# Patient Record
Sex: FEMALE | Race: WHITE | NOT HISPANIC OR LATINO | Employment: UNEMPLOYED | URBAN - METROPOLITAN AREA
[De-identification: names, ages, dates, MRNs, and addresses within clinical notes are randomized per-mention and may not be internally consistent; named-entity substitution may affect disease eponyms.]

---

## 2020-10-06 ENCOUNTER — APPOINTMENT (OUTPATIENT)
Dept: GENERAL RADIOLOGY | Facility: HOSPITAL | Age: 40
End: 2020-10-06

## 2020-10-06 ENCOUNTER — HOSPITAL ENCOUNTER (OUTPATIENT)
Facility: HOSPITAL | Age: 40
Setting detail: OBSERVATION
Discharge: HOME | End: 2020-10-07
Attending: INTERNAL MEDICINE | Admitting: INTERNAL MEDICINE

## 2020-10-06 DIAGNOSIS — R07.9 CHEST PAIN, UNSPECIFIED TYPE: Primary | ICD-10-CM

## 2020-10-06 PROBLEM — E66.811 CLASS 1 OBESITY DUE TO EXCESS CALORIES WITHOUT SERIOUS COMORBIDITY WITH BODY MASS INDEX (BMI) OF 32.0 TO 32.9 IN ADULT: Chronic | Status: ACTIVE | Noted: 2020-10-06

## 2020-10-06 PROBLEM — E66.09 CLASS 1 OBESITY DUE TO EXCESS CALORIES WITHOUT SERIOUS COMORBIDITY WITH BODY MASS INDEX (BMI) OF 32.0 TO 32.9 IN ADULT: Chronic | Status: ACTIVE | Noted: 2020-10-06

## 2020-10-06 PROBLEM — I10 ESSENTIAL HYPERTENSION: Chronic | Status: ACTIVE | Noted: 2020-10-06

## 2020-10-06 PROBLEM — F17.200 TOBACCO DEPENDENCE: Chronic | Status: ACTIVE | Noted: 2020-10-06

## 2020-10-06 LAB
ALBUMIN SERPL-MCNC: 4.2 G/DL (ref 3.5–5.2)
ALBUMIN/GLOB SERPL: 1.4 G/DL
ALP SERPL-CCNC: 87 U/L (ref 39–117)
ALT SERPL W P-5'-P-CCNC: 13 U/L (ref 1–33)
ANION GAP SERPL CALCULATED.3IONS-SCNC: 12 MMOL/L (ref 5–15)
AST SERPL-CCNC: 18 U/L (ref 1–32)
BACTERIA UR QL AUTO: ABNORMAL /HPF
BASOPHILS # BLD AUTO: 0.2 10*3/MM3 (ref 0–0.2)
BASOPHILS NFR BLD AUTO: 1 % (ref 0–1.5)
BILIRUB SERPL-MCNC: 0.4 MG/DL (ref 0–1.2)
BILIRUB UR QL STRIP: NEGATIVE
BUN SERPL-MCNC: 6 MG/DL (ref 6–20)
BUN SERPL-MCNC: NORMAL MG/DL
BUN/CREAT SERPL: NORMAL
CALCIUM SPEC-SCNC: 9.4 MG/DL (ref 8.6–10.5)
CHLORIDE SERPL-SCNC: 104 MMOL/L (ref 98–107)
CLARITY UR: CLEAR
CO2 SERPL-SCNC: 22 MMOL/L (ref 22–29)
COLOR UR: YELLOW
CREAT SERPL-MCNC: 0.68 MG/DL (ref 0.57–1)
D DIMER PPP FEU-MCNC: 0.25 MG/L (FEU) (ref 0–0.59)
DEPRECATED RDW RBC AUTO: 40.3 FL (ref 37–54)
EOSINOPHIL # BLD AUTO: 0.1 10*3/MM3 (ref 0–0.4)
EOSINOPHIL NFR BLD AUTO: 0.8 % (ref 0.3–6.2)
ERYTHROCYTE [DISTWIDTH] IN BLOOD BY AUTOMATED COUNT: 13.8 % (ref 12.3–15.4)
GFR SERPL CREATININE-BSD FRML MDRD: 96 ML/MIN/1.73
GLOBULIN UR ELPH-MCNC: 2.9 GM/DL
GLUCOSE SERPL-MCNC: 95 MG/DL (ref 65–99)
GLUCOSE UR STRIP-MCNC: NEGATIVE MG/DL
HCT VFR BLD AUTO: 47.9 % (ref 34–46.6)
HGB BLD-MCNC: 16.7 G/DL (ref 12–15.9)
HGB UR QL STRIP.AUTO: ABNORMAL
HOLD SPECIMEN: NORMAL
HYALINE CASTS UR QL AUTO: ABNORMAL /LPF
KETONES UR QL STRIP: NEGATIVE
LEUKOCYTE ESTERASE UR QL STRIP.AUTO: NEGATIVE
LYMPHOCYTES # BLD AUTO: 3 10*3/MM3 (ref 0.7–3.1)
LYMPHOCYTES NFR BLD AUTO: 17.9 % (ref 19.6–45.3)
MCH RBC QN AUTO: 29.4 PG (ref 26.6–33)
MCHC RBC AUTO-ENTMCNC: 34.9 G/DL (ref 31.5–35.7)
MCV RBC AUTO: 84 FL (ref 79–97)
MONOCYTES # BLD AUTO: 0.9 10*3/MM3 (ref 0.1–0.9)
MONOCYTES NFR BLD AUTO: 5.2 % (ref 5–12)
NEUTROPHILS NFR BLD AUTO: 12.5 10*3/MM3 (ref 1.7–7)
NEUTROPHILS NFR BLD AUTO: 75.1 % (ref 42.7–76)
NITRITE UR QL STRIP: NEGATIVE
NRBC BLD AUTO-RTO: 0 /100 WBC (ref 0–0.2)
NT-PROBNP SERPL-MCNC: 123.5 PG/ML (ref 0–450)
PH UR STRIP.AUTO: 7.5 [PH] (ref 5–8)
PLATELET # BLD AUTO: 341 10*3/MM3 (ref 140–450)
PMV BLD AUTO: 8.6 FL (ref 6–12)
POTASSIUM SERPL-SCNC: 3.8 MMOL/L (ref 3.5–5.2)
PROT SERPL-MCNC: 7.1 G/DL (ref 6–8.5)
PROT UR QL STRIP: NEGATIVE
RBC # BLD AUTO: 5.7 10*6/MM3 (ref 3.77–5.28)
RBC # UR: ABNORMAL /HPF
REF LAB TEST METHOD: ABNORMAL
SODIUM SERPL-SCNC: 138 MMOL/L (ref 136–145)
SP GR UR STRIP: 1.01 (ref 1–1.03)
SQUAMOUS #/AREA URNS HPF: ABNORMAL /HPF
TROPONIN T SERPL-MCNC: <0.01 NG/ML (ref 0–0.03)
TROPONIN T SERPL-MCNC: <0.01 NG/ML (ref 0–0.03)
UROBILINOGEN UR QL STRIP: ABNORMAL
WBC # BLD AUTO: 16.6 10*3/MM3 (ref 3.4–10.8)
WBC UR QL AUTO: ABNORMAL /HPF
WHOLE BLOOD HOLD SPECIMEN: NORMAL
WHOLE BLOOD HOLD SPECIMEN: NORMAL

## 2020-10-06 PROCEDURE — 85025 COMPLETE CBC W/AUTO DIFF WBC: CPT | Performed by: NURSE PRACTITIONER

## 2020-10-06 PROCEDURE — 85379 FIBRIN DEGRADATION QUANT: CPT | Performed by: NURSE PRACTITIONER

## 2020-10-06 PROCEDURE — G0378 HOSPITAL OBSERVATION PER HR: HCPCS

## 2020-10-06 PROCEDURE — 84520 ASSAY OF UREA NITROGEN: CPT | Performed by: NURSE PRACTITIONER

## 2020-10-06 PROCEDURE — 99285 EMERGENCY DEPT VISIT HI MDM: CPT

## 2020-10-06 PROCEDURE — 99219 PR INITIAL OBSERVATION CARE/DAY 50 MINUTES: CPT | Performed by: PHYSICIAN ASSISTANT

## 2020-10-06 PROCEDURE — 93005 ELECTROCARDIOGRAM TRACING: CPT

## 2020-10-06 PROCEDURE — 83880 ASSAY OF NATRIURETIC PEPTIDE: CPT | Performed by: NURSE PRACTITIONER

## 2020-10-06 PROCEDURE — 80053 COMPREHEN METABOLIC PANEL: CPT | Performed by: NURSE PRACTITIONER

## 2020-10-06 PROCEDURE — 81001 URINALYSIS AUTO W/SCOPE: CPT | Performed by: NURSE PRACTITIONER

## 2020-10-06 PROCEDURE — 84484 ASSAY OF TROPONIN QUANT: CPT | Performed by: INTERNAL MEDICINE

## 2020-10-06 PROCEDURE — 84484 ASSAY OF TROPONIN QUANT: CPT | Performed by: NURSE PRACTITIONER

## 2020-10-06 PROCEDURE — 71045 X-RAY EXAM CHEST 1 VIEW: CPT

## 2020-10-06 PROCEDURE — 93005 ELECTROCARDIOGRAM TRACING: CPT | Performed by: INTERNAL MEDICINE

## 2020-10-06 RX ORDER — HYDROCHLOROTHIAZIDE 12.5 MG/1
12.5 TABLET ORAL DAILY
Status: DISCONTINUED | OUTPATIENT
Start: 2020-10-07 | End: 2020-10-07 | Stop reason: HOSPADM

## 2020-10-06 RX ORDER — SODIUM CHLORIDE 0.9 % (FLUSH) 0.9 %
10 SYRINGE (ML) INJECTION EVERY 12 HOURS SCHEDULED
Status: DISCONTINUED | OUTPATIENT
Start: 2020-10-06 | End: 2020-10-07 | Stop reason: HOSPADM

## 2020-10-06 RX ORDER — SODIUM CHLORIDE 0.9 % (FLUSH) 0.9 %
10 SYRINGE (ML) INJECTION AS NEEDED
Status: DISCONTINUED | OUTPATIENT
Start: 2020-10-06 | End: 2020-10-07 | Stop reason: HOSPADM

## 2020-10-06 RX ORDER — ACETAMINOPHEN 500 MG
1000 TABLET ORAL ONCE
Status: COMPLETED | OUTPATIENT
Start: 2020-10-06 | End: 2020-10-06

## 2020-10-06 RX ORDER — HYDROCHLOROTHIAZIDE 12.5 MG/1
12.5 TABLET ORAL DAILY
COMMUNITY
End: 2020-10-22 | Stop reason: ALTCHOICE

## 2020-10-06 RX ORDER — ACETAMINOPHEN 325 MG/1
650 TABLET ORAL EVERY 4 HOURS PRN
Status: DISCONTINUED | OUTPATIENT
Start: 2020-10-06 | End: 2020-10-07 | Stop reason: HOSPADM

## 2020-10-06 RX ORDER — ONDANSETRON 4 MG/1
4 TABLET, FILM COATED ORAL EVERY 6 HOURS PRN
Status: DISCONTINUED | OUTPATIENT
Start: 2020-10-06 | End: 2020-10-07 | Stop reason: HOSPADM

## 2020-10-06 RX ORDER — DOCUSATE SODIUM 100 MG/1
100 CAPSULE, LIQUID FILLED ORAL 2 TIMES DAILY PRN
Status: DISCONTINUED | OUTPATIENT
Start: 2020-10-06 | End: 2020-10-07 | Stop reason: HOSPADM

## 2020-10-06 RX ORDER — ACETAMINOPHEN 650 MG/1
650 SUPPOSITORY RECTAL EVERY 4 HOURS PRN
Status: DISCONTINUED | OUTPATIENT
Start: 2020-10-06 | End: 2020-10-07 | Stop reason: HOSPADM

## 2020-10-06 RX ORDER — ALUMINA, MAGNESIA, AND SIMETHICONE 2400; 2400; 240 MG/30ML; MG/30ML; MG/30ML
15 SUSPENSION ORAL EVERY 6 HOURS PRN
Status: DISCONTINUED | OUTPATIENT
Start: 2020-10-06 | End: 2020-10-07 | Stop reason: HOSPADM

## 2020-10-06 RX ORDER — BISACODYL 10 MG
10 SUPPOSITORY, RECTAL RECTAL DAILY PRN
Status: DISCONTINUED | OUTPATIENT
Start: 2020-10-06 | End: 2020-10-07 | Stop reason: HOSPADM

## 2020-10-06 RX ORDER — ACETAMINOPHEN 160 MG/5ML
650 SOLUTION ORAL EVERY 4 HOURS PRN
Status: DISCONTINUED | OUTPATIENT
Start: 2020-10-06 | End: 2020-10-07 | Stop reason: HOSPADM

## 2020-10-06 RX ORDER — ONDANSETRON 2 MG/ML
4 INJECTION INTRAMUSCULAR; INTRAVENOUS EVERY 6 HOURS PRN
Status: DISCONTINUED | OUTPATIENT
Start: 2020-10-06 | End: 2020-10-07 | Stop reason: HOSPADM

## 2020-10-06 RX ORDER — CALCIUM CARBONATE 200(500)MG
1 TABLET,CHEWABLE ORAL 2 TIMES DAILY PRN
Status: DISCONTINUED | OUTPATIENT
Start: 2020-10-06 | End: 2020-10-07 | Stop reason: HOSPADM

## 2020-10-06 RX ORDER — NITROGLYCERIN 0.4 MG/1
0.4 TABLET SUBLINGUAL
Status: DISCONTINUED | OUTPATIENT
Start: 2020-10-06 | End: 2020-10-07 | Stop reason: HOSPADM

## 2020-10-06 RX ORDER — ASPIRIN 325 MG
325 TABLET, DELAYED RELEASE (ENTERIC COATED) ORAL ONCE
Status: COMPLETED | OUTPATIENT
Start: 2020-10-06 | End: 2020-10-06

## 2020-10-06 RX ORDER — CHOLECALCIFEROL (VITAMIN D3) 125 MCG
5 CAPSULE ORAL NIGHTLY PRN
Status: DISCONTINUED | OUTPATIENT
Start: 2020-10-06 | End: 2020-10-07 | Stop reason: HOSPADM

## 2020-10-06 RX ADMIN — CALCIUM CARBONATE (ANTACID) CHEW TAB 500 MG 1 TABLET: 500 CHEW TAB at 20:56

## 2020-10-06 RX ADMIN — Medication 10 ML: at 20:56

## 2020-10-06 RX ADMIN — ASPIRIN 325 MG: 325 TABLET, COATED ORAL at 21:18

## 2020-10-06 RX ADMIN — ACETAMINOPHEN 1000 MG: 500 TABLET, FILM COATED ORAL at 17:43

## 2020-10-06 NOTE — ED PROVIDER NOTES
Subjective   Patient is a 40-year-old female who presents emergency department with a complaint of chest pain, elevated blood pressure, left arm tingling.  This began yesterday, she was seen at [facility] in the emergency department, and was also seen by her primary care provider who was initially referred to the emergency department today.  She reports the pain is intermittent, as well as the numbness and tingling.  It is not consistent.  Is nonradiating.  Describes the pain as a heaviness when it does occur, and only occurs for brief periods of time.  She denies any shortness of breath.  No abnormal leg pain or swelling.  Not had a fever cough or chills.  No respiratory symptoms.  No recent long periods of immobility    She reports that she was discharged from [facility], she was started on HCTZ 12.5 mg once a day.  She reports she has been taking the medication but noticed her blood pressure still elevated today.            Review of Systems   Constitutional: Negative for chills and fever.   HENT: Negative for congestion and rhinorrhea.    Respiratory: Negative for cough, chest tightness and shortness of breath.    Cardiovascular: Positive for chest pain. Negative for palpitations and leg swelling.   Gastrointestinal: Negative for abdominal pain, diarrhea, nausea and vomiting.   Genitourinary: Negative for dysuria.   Musculoskeletal: Negative for back pain and neck pain.   Skin: Negative.    Neurological: Positive for numbness. Negative for dizziness and light-headedness.       No past medical history on file.    No Known Allergies    No past surgical history on file.    No family history on file.    Social History     Socioeconomic History   • Marital status:      Spouse name: Not on file   • Number of children: Not on file   • Years of education: Not on file   • Highest education level: Not on file           Objective   Physical Exam  Vitals signs and nursing note reviewed.   Constitutional:       " General: She is not in acute distress.     Appearance: She is well-developed. She is not ill-appearing, toxic-appearing or diaphoretic.   HENT:      Head: Normocephalic and atraumatic.   Eyes:      Extraocular Movements: Extraocular movements intact.      Pupils: Pupils are equal, round, and reactive to light.   Neck:      Musculoskeletal: Normal range of motion and neck supple.   Cardiovascular:      Rate and Rhythm: Normal rate and regular rhythm.      Pulses:           Radial pulses are 2+ on the right side and 2+ on the left side.        Dorsalis pedis pulses are 2+ on the right side and 2+ on the left side.      Heart sounds: Normal heart sounds. No murmur. No friction rub. No gallop.    Pulmonary:      Effort: Pulmonary effort is normal.      Breath sounds: Normal breath sounds.   Abdominal:      General: Bowel sounds are normal.      Palpations: Abdomen is soft.      Tenderness: There is no abdominal tenderness. There is no guarding or rebound.   Musculoskeletal:      Right lower leg: She exhibits no tenderness. No edema.      Left lower leg: She exhibits no tenderness. No edema.   Skin:     General: Skin is warm and dry.      Capillary Refill: Capillary refill takes less than 2 seconds.   Neurological:      General: No focal deficit present.      Mental Status: She is alert and oriented to person, place, and time.   Psychiatric:         Mood and Affect: Mood normal.         Behavior: Behavior normal.         Procedures           ED Course  /85 (BP Location: Right arm, Patient Position: Lying)   Pulse 78   Temp 98.1 °F (36.7 °C)   Resp 16   Ht 162.6 cm (64\")   Wt 85.3 kg (188 lb 0.8 oz)   SpO2 100%   BMI 32.28 kg/m²   Labs Reviewed   CBC WITH AUTO DIFFERENTIAL - Abnormal; Notable for the following components:       Result Value    WBC 16.60 (*)     RBC 5.70 (*)     Hemoglobin 16.7 (*)     Hematocrit 47.9 (*)     Lymphocyte % 17.9 (*)     Neutrophils, Absolute 12.50 (*)     All other components " within normal limits   URINALYSIS W/ MICROSCOPIC IF INDICATED (NO CULTURE) - Abnormal; Notable for the following components:    Blood, UA Trace (*)     All other components within normal limits   URINALYSIS, MICROSCOPIC ONLY - Abnormal; Notable for the following components:    RBC, UA 0-2 (*)     WBC, UA 3-5 (*)     Squamous Epithelial Cells, UA 7-12 (*)     All other components within normal limits   TROPONIN (IN-HOUSE) - Normal    Narrative:     Troponin T Reference Range:  <= 0.03 ng/mL-   Negative for AMI  >0.03 ng/mL-     Abnormal for myocardial necrosis.  Clinicians would have to utilize clinical acumen, EKG, Troponin and serial changes to determine if it is an Acute Myocardial Infarction or myocardial injury due to an underlying chronic condition.       Results may be falsely decreased if patient taking Biotin.     BNP (IN-HOUSE) - Normal    Narrative:     Among patients with dyspnea, NT-proBNP is highly sensitive for the detection of acute congestive heart failure. In addition NT-proBNP of <300 pg/ml effectively rules out acute congestive heart failure with 99% negative predictive value.    Results may be falsely decreased if patient taking Biotin.     D-DIMER, QUANTITATIVE - Normal    Narrative:     Reference Range  --------------------------------------------------------------------     < 0.50   Negative Predictive Value  0.50-0.59   Indeterminate    >= 0.60   Probable VTE             A very low percentage of patients with DVT may yield D-Dimer results   below the cut-off of 0.50 mg/L FEU.  This is known to be more   prevalent in patients with distal DVT.             Results of this test should always be interpreted in conjunction with   the patient's medical history, clinical presentation and other   findings.  Clinical diagnosis should not be based on the result of   INNOVANCE D-Dimer alone.   BUN - Normal   RAINBOW DRAW    Narrative:     The following orders were created for panel order Elsie  Draw.  Procedure                               Abnormality         Status                     ---------                               -----------         ------                     Light Blue Top[[id number]]                                   Final result               Green Top (Gel)[[id number]]                                                             Lavender Top[[id number]]                                     Final result               Gold Top - SST[[id number]]                                   Final result                 Please view results for these tests on the individual orders.   COMPREHENSIVE METABOLIC PANEL    Narrative:     GFR Normal >60  Chronic Kidney Disease <60  Kidney Failure <15     TROPONIN (IN-HOUSE)   TROPONIN (IN-HOUSE)   CBC AND DIFFERENTIAL    Narrative:     The following orders were created for panel order CBC & Differential.  Procedure                               Abnormality         Status                     ---------                               -----------         ------                     CBC Auto Differential[[id number]]        Abnormal            Final result                 Please view results for these tests on the individual orders.   LIGHT BLUE TOP   LAVENDER TOP   GOLD TOP - SST     Medications   sodium chloride 0.9 % flush 10 mL (has no administration in time range)   sodium chloride 0.9 % flush 10 mL (has no administration in time range)   acetaminophen (TYLENOL) tablet 1,000 mg (1,000 mg Oral Given 10/6/20 1743)     Xr Chest 1 View    Result Date: 10/6/2020  No acute chest finding.  Electronically Signed By-Dr. Tessa Longoria MD On:10/6/2020 4:10 PM This report was finalized on [id number] by Dr. Tessa Longoria MD.      ED Course as of Oct 06 1945   Tue Oct 06, 2020   1811 Patient was updated at the bedside, we discussed the plan of care, no acute distress at this time.  Patient agrees with current treatment plan.  No needs at this time    [LB]   1814 Attempted to take  records from [facility], however the only records available they were able recent were a chest x-ray and EKG.  No notes or labs were able to be reviewed    [LB]   1846 Spoke with Dr. Berumen.     [LB]      ED Course User Index  [LB] Erlinda Rodriguez, GENEVA      Appropriate PPE was worn during the duration of the care for this patient while in the emergency department per [facility] guidelines     EKG independently viewed by me, Interpreted by ED physicisan Dr. Dye   Rate:92  Rhythm:SR  Previous EKG:No previous here, compared to [facility] EKG.                                MDM  Number of Diagnoses or Management Options  Chest pain, unspecified type:   Diagnosis management comments: Differentials: Angina, PE, acute coronary syndrome, costochondritis,   This list is not all inclusive and does not constitute the entireity of considered causes.     Labs reviewed by me and significant for the following: CBC reveals a elevated white blood cell count 16.6, increased hemoglobin hematocrit.  D-dimer negative, .5, opponent negative, CMP non-concerning, UA does not appear to be indicative of UTI.    Imaging, Interpreted per radiologist, independently viewed by myself: No acute findings on plain film x-ray.    Patient was brought back to the emergency department room for evaluation and  placed on appropriate monitoring.   IV was established, blood work obtained.  Patient is remained afebrile.  Her blood pressure has improved in the ED without intervention.  She does continue to have some intermittent episodes of chest pain, but nothing that is been sustained.  Her work-up here in the ED does not reveal any acute changes, however given her report of chest pain, left arm tingling, she will be admitted to hospitalist for further evaluation and management.        Plan and Disposition: I discussed with the patient their test results, work-up here in the emergency department, and need for admission and  further evaluation.  Patient is agreeable to the plan of care.  Opportunity was provided for questions at the bedside, all questions and concerns were addressed.           Amount and/or Complexity of Data Reviewed  Clinical lab tests: reviewed  Tests in the radiology section of CPT®: reviewed  Tests in the medicine section of CPT®: reviewed  Discuss the patient with other providers: (Hospitalist)    Patient Progress  Patient progress: stable      Final diagnoses:   Chest pain, unspecified type            Erlinda Rodriguez, APRN  10/06/20 1945

## 2020-10-06 NOTE — ED NOTES
Requested EKG for CDU 2. No techs or machine available at this time      Ml Hook RN  10/06/20 5958

## 2020-10-06 NOTE — H&P
[facility] Medicine Services      Patient Name: Vidya Wong  : 1980  MRN: [id number]  Primary Care Physician: Provider, No Known  Date of admission: 10/6/2020    Patient Care Team:  Provider, No Known as PCP - General          Subjective   History Present Illness     Chief Complaint:   Chief Complaint   Patient presents with   • Chest Pain     Chest pain    Ms. Wong is a 40 y.o.  presents to [facility] complaining of chest pain that started while at [facility] ED on 10/5/2020 during the evening while she was there for complaint of elevated blood pressure and has been constant since that time with intermittent episodes of increased intensity for a duration of 1-2 minutes. Patient describes the chest pain as achy, tightness and pressure and its located centrally with radiation to right and left aspect of chest; chest pain improves with laying down and rest and worsens with movements and heat. Patient does report associated dyspnea with the chest pain. Patient reports that she has had left arm tingling and numbness that she has not noticed if it occurs with the intermittent intensity of chest pain. Patient denies any fever/chills, cough, sputum, nasal congestion, rashes, dysuria, or recent travel/trauma/sick contacts.           History of Present Illness    Review of Systems   Constitution: Negative for fever.   HENT: Negative for congestion.    Cardiovascular: Positive for chest pain.   Respiratory: Negative for cough.    Gastrointestinal: Negative.    Genitourinary: Negative for dysuria.   All other systems reviewed and are negative.          Personal History     Past Medical History:   Past Medical History:   Diagnosis Date   • Hypertension        Surgical History:      Past Surgical History:   Procedure Laterality Date   • HIP SURGERY  2017    sherri removed    • HYSTERECTOMY  2015   • KNEE SURGERY Right 2017    PCO replaced    • TOE SURGERY  2017    [address] [place], [place] [address]saral  surgery            Family History: family history includes Heart disease in her father and maternal grandmother; Hypertension in her father and maternal grandmother. Otherwise pertinent FHx was reviewed and unremarkable.     Social History:  reports that she has been smoking cigarettes. She has been smoking about 1.00 pack per day. She has never used smokeless tobacco. She reports current alcohol use. She reports that she does not use drugs.      Medications:  Prior to Admission medications    Medication Sig Start Date End Date Taking? Authorizing Provider   hydroCHLOROthiazide (HYDRODIURIL) 12.5 MG tablet Take 12.5 mg by mouth Daily.   Yes Provider, Historical, MD       Allergies:  No Known Allergies    Objective   Objective     Vital Signs  Temp:  [97.4 °F (36.3 °C)-98.1 °F (36.7 °C)] 97.4 °F (36.3 °C)  Heart Rate:  [77-88] 80  Resp:  [15-19] 15  BP: (129-163)/() 152/96  SpO2:  [96 %-100 %] 98 %  on   ;   Device (Oxygen Therapy): room air  Body mass index is 32.35 kg/m².    Physical Exam  Vitals signs reviewed.   Constitutional:       General: She is not in acute distress.     Appearance: Normal appearance. She is obese. She is not ill-appearing, toxic-appearing or diaphoretic.   HENT:      Head: Normocephalic and atraumatic.      Right Ear: External ear normal.      Left Ear: External ear normal.      Nose: No congestion or rhinorrhea.      Mouth/Throat:      Comments: Wearing mask  Eyes:      General: No scleral icterus.        Right eye: No discharge.         Left eye: No discharge.      Extraocular Movements: Extraocular movements intact.      Conjunctiva/sclera: Conjunctivae normal.   Neck:      Musculoskeletal: Neck supple.   Cardiovascular:      Rate and Rhythm: Normal rate and regular rhythm.      Heart sounds: Normal heart sounds. No murmur.   Pulmonary:      Effort: Pulmonary effort is normal. No respiratory distress.      Breath sounds: Normal breath sounds.   Abdominal:      General: Bowel sounds  are normal. There is no distension.      Palpations: Abdomen is soft.      Tenderness: There is no abdominal tenderness. There is no guarding.   Musculoskeletal: Normal range of motion.         General: No swelling or tenderness.   Skin:     General: Skin is warm.      Coloration: Skin is not pale.   Neurological:      General: No focal deficit present.      Mental Status: She is alert and oriented to person, place, and time.      Cranial Nerves: No cranial nerve deficit.   Psychiatric:         Mood and Affect: Mood normal.         Behavior: Behavior normal.         Thought Content: Thought content normal.         Judgment: Judgment normal.         Results Review:  I have personally reviewed most recent cardiac tracings, lab results and radiology images and interpretations and agree with findings, most notably: negative EKG and troponin.    Results from last 7 days   Lab Units 10/06/20  1558   WBC 10*3/mm3 16.60*   HEMOGLOBIN g/dL 16.7*   HEMATOCRIT % 47.9*   PLATELETS 10*3/mm3 341     Results from last 7 days   Lab Units 10/06/20  1952 10/06/20  1717   SODIUM mmol/L  --  138   POTASSIUM mmol/L  --  3.8   CHLORIDE mmol/L  --  104   CO2 mmol/L  --  22.0   BUN mg/dL  --  6   CREATININE mg/dL  --  0.68   GLUCOSE mg/dL  --  95   CALCIUM mg/dL  --  9.4   ALT (SGPT) U/L  --  13   AST (SGOT) U/L  --  18   TROPONIN T ng/mL <0.010 <0.010   PROBNP pg/mL  --  123.5     Estimated Creatinine Clearance: 117.5 mL/min (by C-G formula based on SCr of 0.68 mg/dL).  Brief Urine Lab Results  (Last result in the past 365 days)      Color   Clarity   Blood   Leuk Est   Nitrite   Protein   CREAT   Urine HCG        10/06/20 1722 Yellow Clear Trace Negative Negative Negative               Microbiology Results (last 10 days)     ** No results found for the last 240 hours. **          ECG/EMG Results (most recent)     Procedure Component Value Units Date/Time    ECG 12 Lead [[id number]] Collected: 10/06/20 1608     Updated: 10/06/20 1610     Narrative:      HEART RATE= 92  bpm  RR Interval= 652  ms  UT Interval= 163  ms  P Horizontal Axis= 7  deg  P Front Axis= 19  deg  QRSD Interval= 83  ms  QT Interval= 415  ms  QRS Axis= 20  deg  T Wave Axis= -3  deg  - ABNORMAL ECG -  Sinus rhythm  Prolonged QT interval  Electronically Signed By:   Date and Time of Study: 2020-10-06 16:08:38                    Xr Chest 1 View    Result Date: 10/6/2020  No acute chest finding.  Electronically Signed By-Dr. Tessa Longoria MD On:10/6/2020 4:10 PM This report was finalized on [id number] by Dr. Tessa Longoria MD.        Estimated Creatinine Clearance: 117.5 mL/min (by C-G formula based on SCr of 0.68 mg/dL).    Assessment/Plan   Assessment/Plan       Active Hospital Problems    Diagnosis  POA   • **Chest pain [R07.9]  Yes     Priority: High   • Essential hypertension [I10]  Yes   • Class 1 obesity due to excess calories without serious comorbidity with body mass index (BMI) of 32.0 to 32.9 in adult [E66.09, Z68.32]  Not Applicable   • Tobacco dependence [F17.200]  Unknown      Resolved Hospital Problems   No resolved problems to display.     Chest pain:  - Serial troponins, on continuous cardiac monitor  - HEART score 3  - Stress test in the AM  - EKG personally reviewed and shows NSR and no acute ST changes; QT prolongation is seen  - Will give Aspirin   - Ordering A1c and lipid panel to monitor for other risk facttors    Leukocytosis: Patient has elevated WBC of 16.6 but denies any fever, cough, or other infectious symptoms. Will repeat and monitor. No ABX at this time.     HTN, uncontrolled: patient was newly diagnosed on 10/5/2020 and started on hydrochlorothiazide 12.5mg, will continue and add daily Lisinopril.     Obese: BMI 32.35, lifestyle modification discussed    Tobacco use: smoking cessation discussed and encouraged. Nicotine patch C/I with Nitroglycerin use for chest pain.             VTE Prophylaxis -   Mechanical Order History:     None         Pharmalogical Order History:     None          CODE STATUS:    Code Status and Medical Interventions:   Ordered at: 10/06/20 2044     Code Status:    CPR     Medical Interventions (Level of Support Prior to Arrest):    Full       This patient has been examined wearing appropriate Personal Protective Equipment and discussed with ED provider. 10/06/20      I discussed the patient's findings and my recommendations with patient.        Electronically signed by Chrissei Mas PA-C, 10/06/20, 7:37 PM EDT.  [facility] Hospitalist Team
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
## 2020-10-07 ENCOUNTER — APPOINTMENT (OUTPATIENT)
Dept: NUCLEAR MEDICINE | Facility: HOSPITAL | Age: 40
End: 2020-10-07

## 2020-10-07 VITALS
OXYGEN SATURATION: 95 % | DIASTOLIC BLOOD PRESSURE: 86 MMHG | BODY MASS INDEX: 32.18 KG/M2 | HEART RATE: 80 BPM | SYSTOLIC BLOOD PRESSURE: 134 MMHG | TEMPERATURE: 98.6 F | HEIGHT: 64 IN | WEIGHT: 188.49 LBS | RESPIRATION RATE: 16 BRPM

## 2020-10-07 PROBLEM — E78.2 MIXED HYPERLIPIDEMIA: Status: ACTIVE | Noted: 2020-10-07

## 2020-10-07 PROBLEM — R07.9 CHEST PAIN: Status: RESOLVED | Noted: 2020-10-06 | Resolved: 2020-10-07

## 2020-10-07 LAB
ANION GAP SERPL CALCULATED.3IONS-SCNC: 10 MMOL/L (ref 5–15)
BASOPHILS # BLD AUTO: 0.1 10*3/MM3 (ref 0–0.2)
BASOPHILS NFR BLD AUTO: 0.7 % (ref 0–1.5)
BH CV NUCLEAR PRIOR STUDY: 3
BH CV STRESS BP STAGE 1: NORMAL
BH CV STRESS BP STAGE 2: NORMAL
BH CV STRESS BP STAGE 3: NORMAL
BH CV STRESS BP STAGE 4: NORMAL
BH CV STRESS COMMENTS STAGE 1: NORMAL
BH CV STRESS COMMENTS STAGE 2: NORMAL
BH CV STRESS DOSE REGADENOSON STAGE 1: 0.4
BH CV STRESS DURATION MIN STAGE 1: 0
BH CV STRESS DURATION MIN STAGE 2: 4
BH CV STRESS DURATION SEC STAGE 1: 10
BH CV STRESS DURATION SEC STAGE 2: 0
BH CV STRESS HR STAGE 1: 101
BH CV STRESS HR STAGE 2: 120
BH CV STRESS HR STAGE 3: 115
BH CV STRESS HR STAGE 4: 115
BH CV STRESS PROTOCOL 1: NORMAL
BH CV STRESS RECOVERY BP: NORMAL MMHG
BH CV STRESS RECOVERY HR: 98 BPM
BH CV STRESS STAGE 1: 1
BH CV STRESS STAGE 2: 2
BH CV STRESS STAGE 3: 3
BH CV STRESS STAGE 4: 4
BUN SERPL-MCNC: 7 MG/DL (ref 6–20)
BUN SERPL-MCNC: NORMAL MG/DL
BUN/CREAT SERPL: NORMAL
CALCIUM SPEC-SCNC: 9.1 MG/DL (ref 8.6–10.5)
CHLORIDE SERPL-SCNC: 102 MMOL/L (ref 98–107)
CHOLEST SERPL-MCNC: 228 MG/DL (ref 0–200)
CO2 SERPL-SCNC: 26 MMOL/L (ref 22–29)
CREAT SERPL-MCNC: 0.8 MG/DL (ref 0.57–1)
DEPRECATED RDW RBC AUTO: 40.7 FL (ref 37–54)
EOSINOPHIL # BLD AUTO: 0.2 10*3/MM3 (ref 0–0.4)
EOSINOPHIL NFR BLD AUTO: 1.6 % (ref 0.3–6.2)
ERYTHROCYTE [DISTWIDTH] IN BLOOD BY AUTOMATED COUNT: 13.7 % (ref 12.3–15.4)
GFR SERPL CREATININE-BSD FRML MDRD: 80 ML/MIN/1.73
GLUCOSE SERPL-MCNC: 97 MG/DL (ref 65–99)
HBA1C MFR BLD: 5.2 % (ref 3.5–5.6)
HCT VFR BLD AUTO: 43.4 % (ref 34–46.6)
HDLC SERPL-MCNC: 36 MG/DL (ref 40–60)
HGB BLD-MCNC: 15 G/DL (ref 12–15.9)
LDLC SERPL CALC-MCNC: 156 MG/DL (ref 0–100)
LDLC/HDLC SERPL: 4.33 {RATIO}
LV EF NUC BP: 50 %
LYMPHOCYTES # BLD AUTO: 4.2 10*3/MM3 (ref 0.7–3.1)
LYMPHOCYTES NFR BLD AUTO: 33.5 % (ref 19.6–45.3)
MAXIMAL PREDICTED HEART RATE: 181 BPM
MCH RBC QN AUTO: 28.9 PG (ref 26.6–33)
MCHC RBC AUTO-ENTMCNC: 34.6 G/DL (ref 31.5–35.7)
MCV RBC AUTO: 83.5 FL (ref 79–97)
MONOCYTES # BLD AUTO: 0.8 10*3/MM3 (ref 0.1–0.9)
MONOCYTES NFR BLD AUTO: 6.2 % (ref 5–12)
NEUTROPHILS NFR BLD AUTO: 58 % (ref 42.7–76)
NEUTROPHILS NFR BLD AUTO: 7.4 10*3/MM3 (ref 1.7–7)
NRBC BLD AUTO-RTO: 0.1 /100 WBC (ref 0–0.2)
PERCENT MAX PREDICTED HR: 66.3 %
PLATELET # BLD AUTO: 268 10*3/MM3 (ref 140–450)
PMV BLD AUTO: 8.2 FL (ref 6–12)
POTASSIUM SERPL-SCNC: 3.5 MMOL/L (ref 3.5–5.2)
RBC # BLD AUTO: 5.2 10*6/MM3 (ref 3.77–5.28)
SODIUM SERPL-SCNC: 138 MMOL/L (ref 136–145)
STRESS BASELINE BP: NORMAL MMHG
STRESS BASELINE HR: 60 BPM
STRESS PERCENT HR: 78 %
STRESS POST PEAK BP: NORMAL MMHG
STRESS POST PEAK HR: 120 BPM
STRESS TARGET HR: 154 BPM
TRIGL SERPL-MCNC: 180 MG/DL (ref 0–150)
TROPONIN T SERPL-MCNC: <0.01 NG/ML (ref 0–0.03)
VLDLC SERPL-MCNC: 36 MG/DL
WBC # BLD AUTO: 12.7 10*3/MM3 (ref 3.4–10.8)

## 2020-10-07 PROCEDURE — G0378 HOSPITAL OBSERVATION PER HR: HCPCS

## 2020-10-07 PROCEDURE — 93017 CV STRESS TEST TRACING ONLY: CPT

## 2020-10-07 PROCEDURE — 84484 ASSAY OF TROPONIN QUANT: CPT | Performed by: PHYSICIAN ASSISTANT

## 2020-10-07 PROCEDURE — 80048 BASIC METABOLIC PNL TOTAL CA: CPT | Performed by: PHYSICIAN ASSISTANT

## 2020-10-07 PROCEDURE — 78452 HT MUSCLE IMAGE SPECT MULT: CPT | Performed by: INTERNAL MEDICINE

## 2020-10-07 PROCEDURE — 80061 LIPID PANEL: CPT | Performed by: PHYSICIAN ASSISTANT

## 2020-10-07 PROCEDURE — 85025 COMPLETE CBC W/AUTO DIFF WBC: CPT | Performed by: PHYSICIAN ASSISTANT

## 2020-10-07 PROCEDURE — 99217 PR OBSERVATION CARE DISCHARGE MANAGEMENT: CPT | Performed by: INTERNAL MEDICINE

## 2020-10-07 PROCEDURE — 0 TECHNETIUM SESTAMIBI: Performed by: INTERNAL MEDICINE

## 2020-10-07 PROCEDURE — 93018 CV STRESS TEST I&R ONLY: CPT | Performed by: NURSE PRACTITIONER

## 2020-10-07 PROCEDURE — A9500 TC99M SESTAMIBI: HCPCS | Performed by: INTERNAL MEDICINE

## 2020-10-07 PROCEDURE — 78452 HT MUSCLE IMAGE SPECT MULT: CPT

## 2020-10-07 PROCEDURE — 25010000002 REGADENOSON 0.4 MG/5ML SOLUTION: Performed by: INTERNAL MEDICINE

## 2020-10-07 PROCEDURE — 83036 HEMOGLOBIN GLYCOSYLATED A1C: CPT | Performed by: PHYSICIAN ASSISTANT

## 2020-10-07 RX ORDER — ATORVASTATIN CALCIUM 20 MG/1
20 TABLET, FILM COATED ORAL NIGHTLY
Qty: 30 TABLET | Refills: 0 | Status: SHIPPED | OUTPATIENT
Start: 2020-10-07 | End: 2021-05-24

## 2020-10-07 RX ORDER — ATORVASTATIN CALCIUM 40 MG/1
40 TABLET, FILM COATED ORAL NIGHTLY
Status: DISCONTINUED | OUTPATIENT
Start: 2020-10-07 | End: 2020-10-07 | Stop reason: HOSPADM

## 2020-10-07 RX ORDER — ASPIRIN 81 MG/1
81 TABLET ORAL DAILY
Status: DISCONTINUED | OUTPATIENT
Start: 2020-10-07 | End: 2020-10-07 | Stop reason: HOSPADM

## 2020-10-07 RX ORDER — ASPIRIN 81 MG/1
81 TABLET ORAL DAILY
Qty: 30 TABLET | Refills: 0 | Status: SHIPPED | OUTPATIENT
Start: 2020-10-08

## 2020-10-07 RX ADMIN — TECHNETIUM TC 99M SESTAMIBI 1 DOSE: 1 INJECTION INTRAVENOUS at 06:46

## 2020-10-07 RX ADMIN — TECHNETIUM TC 99M SESTAMIBI 1 DOSE: 1 INJECTION INTRAVENOUS at 09:10

## 2020-10-07 RX ADMIN — REGADENOSON 0.4 MG: 0.08 INJECTION, SOLUTION INTRAVENOUS at 09:10

## 2020-10-07 RX ADMIN — Medication 10 ML: at 10:41

## 2020-10-07 RX ADMIN — HYDROCHLOROTHIAZIDE 12.5 MG: 12.5 TABLET ORAL at 10:40

## 2020-10-07 NOTE — PLAN OF CARE
Goal Outcome Evaluation:  Plan of Care Reviewed With: patient  Progress: improving  Outcome Summary: Pt. underwent cardiac stress test this morning with reported negative results from cardiologist. Pt will likely discharge home today.

## 2020-10-07 NOTE — DISCHARGE SUMMARY
[facility] Medicine Services  DISCHARGE SUMMARY        Prepared For PCP:  Provider, No Known    Patient Name: Vidya Wong  : 1980  MRN: [id number]      Date of Admission:   10/6/2020    Date of Discharge:  10/7/2020    Length of stay:  LOS: 0 days     Hospital Course     Presenting Problem:   Chest pain, unspecified type [R07.9]      Active Hospital Problems    Diagnosis  POA   • Mixed hyperlipidemia [E78.2]  Clinically Undetermined   • Essential hypertension [I10]  Yes   • Class 1 obesity due to excess calories without serious comorbidity with body mass index (BMI) of 32.0 to 32.9 in adult [E66.09, Z68.32]  Not Applicable   • Tobacco dependence [F17.200]  Unknown      Resolved Hospital Problems    Diagnosis Date Resolved POA   • **Chest pain [R07.9] 10/07/2020 Yes           Hospital Course:  Vidya Wong is a 40 y.o. female who presents to [facility] complaining of chest pain that started while at [facility] ED on 10/5/2020 during the evening while she was there for c omplaint of elevated blood pressure and has been constant since that time with intermittent episodes of increased intensity for a duration of 1-2 minutes. Patient describes the chest pain as achy, tightness and pressure and its located centrally with radiation to right and left aspect of chest; chest pain improves with laying down and rest and worsens with movements and heat. Patient does report associated dyspnea with the chest pain. Patient reports that she has had left arm tingling and numbness that she has not noticed if it occurs with the intermittent intensity of chest pain. Patient denies any fever/chills, cough, sputum, nasal congestion, rashes, dysuria, or recent travel/trauma/sick contacts.      Serial troponin were negative and EKG showed no acute ST changes, ruling out ACS.  Stress test showed no myocardial ischemia. The patient was discharged in good condition with new prescriptions for  atorvastatin and aspirin.  He was given an outpatient appointment to follow-up with Cardiology for additional risk modification and his PCP after discharge.      Day of Discharge     HPI:  Pt denies any further chest pain or SOA.    Vital Signs:   Temp:  [97.4 °F (36.3 °C)-98.6 °F (37 °C)] 98.6 °F (37 °C)  Heart Rate:  [60-88] 80  Resp:  [15-19] 16  BP: (128-163)/() 134/86     Physical Exam:  General: well-developed and well-nourished, NAD  HEENT: NC/AT, EOMI, PERRLA  Heart: RRR. No murmur   Chest: CTAB, no w/r/r, normal respiratory effort  Abdominal: Soft. NT/ND. Bowel sounds present  Musculoskeletal: Normal ROM.  No edema. No calf tenderness.  Neurological: AAOx3, no focal deficits  Skin: Skin is warm and dry. No rash  Psychiatric: Normal mood and affect.    Pertinent  and/or Most Recent Results     Results from last 7 days   Lab Units 10/07/20  0408 10/06/20  1717 10/06/20  1558   WBC 10*3/mm3 12.70*  --  16.60*   HEMOGLOBIN g/dL 15.0  --  16.7*   HEMATOCRIT % 43.4  --  47.9*   PLATELETS 10*3/mm3 268  --  341   SODIUM mmol/L 138 138  --    POTASSIUM mmol/L 3.5 3.8  --    CHLORIDE mmol/L 102 104  --    CO2 mmol/L 26.0 22.0  --    BUN  7 6  --    CREATININE mg/dL 0.80 0.68  --    GLUCOSE mg/dL 97 95  --    CALCIUM mg/dL 9.1 9.4  --      Results from last 7 days   Lab Units 10/06/20  1717   BILIRUBIN mg/dL 0.4   ALK PHOS U/L 87   ALT (SGPT) U/L 13   AST (SGOT) U/L 18     Results from last 7 days   Lab Units 10/07/20  0408   CHOLESTEROL mg/dL 228*   TRIGLYCERIDES mg/dL 180*   HDL CHOL mg/dL 36*     Results from last 7 days   Lab Units 10/07/20  0408 10/06/20  1952 10/06/20  1717   HEMOGLOBIN A1C % 5.2  --   --    PROBNP pg/mL  --   --  123.5   TROPONIN T ng/mL <0.010 <0.010 <0.010       Brief Urine Lab Results  (Last result in the past 365 days)      Color   Clarity   Blood   Leuk Est   Nitrite   Protein   CREAT   Urine HCG        10/06/20 1722 Yellow Clear Trace Negative Negative Negative                      Microbiology Results Abnormal     None          Xr Chest 1 View    Result Date: 10/6/2020  Impression: No acute chest finding.  Electronically Signed By-Dr. Tessa Longoria MD On:10/6/2020 4:10 PM This report was finalized on [id number] by Dr. Tessa Longoria MD.                             Test Results Pending at Discharge        Procedures Performed           Consults:   Consults     Date and Time Order Name Status Description    10/6/2020 1814 Hospitalist (on-call MD unless specified) Completed             Discharge Details        Discharge Medications      New Medications      Instructions Start Date   aspirin 81 MG EC tablet   81 mg, Oral, Daily   Start Date: October 8, 2020     atorvastatin 20 MG tablet  Commonly known as: LIPITOR   20 mg, Oral, Nightly         Continue These Medications      Instructions Start Date   hydroCHLOROthiazide 12.5 MG tablet  Commonly known as: HYDRODIURIL   12.5 mg, Oral, Daily             No Known Allergies      Discharge Disposition:      Diet:  Hospital:  Diet Order   Procedures   • Diet Cardiac; Healthy Heart         Discharge Activity:   Activity Instructions     Activity as Tolerated              CODE STATUS:    Code Status and Medical Interventions:   Ordered at: 10/06/20 2044     Code Status:    CPR     Medical Interventions (Level of Support Prior to Arrest):    Full         Follow-up Appointments  Future Appointments   Date Time Provider Department Center   10/22/2020  3:00 PM Prachi López APRN [facility] None       Additional Instructions for the Follow-ups that You Need to Schedule     Call MD With Problems / Concerns   As directed      Instructions: Call [phone] or email [email or web address] for problems or concerns.    Order Comments: Instructions: Call [phone] or email [email or web address]@"[email or web address] for problems or concerns.          Discharge Follow-up with PCP   As directed       Currently Documented PCP:    Provider, No Known    PCP  Phone Number:    None     Follow Up Details: as scheduled on 10/26/2020         Discharge Follow-up with Specialty: [facility] - [facility]; 2 Weeks   As directed      Specialty: [facility]    Follow Up: 2 Weeks                 Condition on Discharge:      Stable      This patient has been examined wearing appropriate Personal Protective Equipment. 10/07/20      Electronically signed by Morena Berumen MD, 10/07/20, 3:02 PM EDT.      Time: I spent  25  minutes on this discharge activity which included face-to-face encounter with the patient/reviewing the data in the system/coordination of the care with the nursing staff as well as consultants/documentation/entering orders.

## 2020-10-07 NOTE — PROGRESS NOTES
Discharge Planning Assessment   [place]     Patient Name: Vidya Wong  MRN: [id number]  Today's Date: 10/7/2020    Admit Date: 10/6/2020    Discharge Needs Assessment     Row Name 10/07/20 1513       Living Environment    Lives With  spouse;child(jose luis), dependent    Name(s) of Who Lives With Patient  Spouse- Casey and daughter    Current Living Arrangements  home/apartment/condo House    Primary Care Provided by  self    Provides Primary Care For  no one    Family Caregiver if Needed  spouse    Quality of Family Relationships  supportive    Able to Return to Prior Arrangements  yes       Resource/Environmental Concerns    Resource/Environmental Concerns  none    Transportation Concerns  car, none       Transition Planning    Patient/Family Anticipates Transition to  home with family    Patient/Family Anticipated Services at Transition  none    Transportation Anticipated  family or friend will provide;car, drives self       Discharge Needs Assessment    Readmission Within the Last 30 Days  no previous admission in last 30 days    Equipment Currently Used at Home  none    Concerns to be Addressed  no discharge needs identified;denies needs/concerns at this time    Equipment Needed After Discharge  none    Provided Post Acute Provider List?  Refused    Discharge Coordination/Progress  PCP is Christina Nunn. Patient reports she has an upcoming appt scheduled on October 26, 2020 at 2 pm to see PCP for annual appt since it has been a while since last visit. Patient reports no trouble affording medications at this time.        Discharge Plan     Row Name 10/07/20 1515       Plan    Plan  DC Plan: Anticipate routine home, denies needs at this time.    Patient/Family in Agreement with Plan  yes    Plan Comments  Contacted patient to discuss dc planning via telephone. No needs identified at this time. Patient had stress test performed, results negative. DC orders in at this time.    Final Discharge Disposition Code  01  - home or self-care          Expected Discharge Date and Time     Expected Discharge Date Expected Discharge Time    Oct 7, 2020         Demographic Summary     Row Name 10/07/20 1509       General Information    Admission Type  observation    Reason for Consult  discharge planning    Preferred Language  English     Used During This Interaction  no       Contact Information    Permission Granted to Share Info With          Functional Status     Row Name 10/07/20 1513       Functional Status    Usual Activity Tolerance  good    Current Activity Tolerance  good       Functional Status, IADL    Medications  independent    Meal Preparation  independent    Housekeeping  independent    Laundry  independent    Shopping  independent     Kathleen Russell RN       Office Phone: [phone]  Office Cell: [phone]

## 2020-10-21 NOTE — PROGRESS NOTES
[facility] CARDIOLOGY      REASON FOR FOLLOW-UP:  Chest pain  Hypertension  Dyslipidemia          Chief Complaint   Patient presents with   • Hypertension     f/u hospital stay for HTN           History of Present Illness   40-year-old female with no known history of premature coronary artery disease.  The patient was evaluated at [facility] 10/6/2020 for symptoms of chest pain.  She underwent further ischemic work-up via nuclear stress testing that showed no evidence of prior myocardial injury and no evidence of inducible ischemia.  EF normal at 50%.  Blood pressure was elevated at admission with diastolic readings in the 90s.  She was discharged on hydrochlorothiazide 12.5 mg once daily.  She presents today in follow-up from that admission.    Today, patient denies any further chest discomfort.  She denies any shortness of breath at rest, dyspnea with exertion, orthopnea or PND.  She denies any dizziness or lightheadedness.  No lower extremity edema.  Her blood pressure is elevated today at 137/93.  Patient had several questions about her blood pressure and genetics in terms of coronary disease.  All questions were answered to her satisfaction.  She does have significant coronary disease on her father's side.    Assessment:  Chest pain  Hypertension  Dyslipidemia  Obesity  Tobacco dependence    Plan:  Start patient on Zestoretic 10-12 0.5 for improved blood pressure management.  Continue aggressive risk factor modifications-schedule lipids at next office visit  Patient to monitor pressures at home and bring in a log in 1 week.  Follow-up in 4 weeks or sooner if needed          The following portions of the patient's history were reviewed and updated as appropriate: allergies, current medications, past family history, past medical history, past social history, past surgical history and problem list.    REVIEW OF SYSTEMS:    Review of Systems   Cardiovascular: Positive for chest  pain.   All other systems reviewed and are negative.      Vitals:    10/22/20 1459   BP: 137/93   Pulse: 76   SpO2: 98%         PHYSICAL EXAM:    General: Alert, cooperative, no distress, appears stated age  Head:  Normocephalic, atraumatic, mucous membranes moist  Eyes:  Conjunctiva/corneas clear, EOM's intact     Neck:  Supple,  no JVD or bruit     Lungs: Clear to auscultation bilaterally, no wheezes rhonchi rales are noted  Chest wall: No tenderness  Musculoskeletal:   Ambulates freely without assistance  Heart::  Regular rate and rhythm, S1 and S2 normal, no murmur, rub or gallop  Abdomen: Soft, non-tender, nondistended, bowel sounds active, no abdominal bruit  Extremities: No cyanosis, clubbing, or edema   Pulses: 2+ and symmetric all extremities  Skin:  No rashes or lesions  Neuro/psych: A&O x3. CN II through XII are grossly intact with appropriate affect        Past Medical History:   Diagnosis Date   • Hypertension        Past Surgical History:   Procedure Laterality Date   • HIP SURGERY  2017    sherri removed    • HYSTERECTOMY  2015   • KNEE SURGERY Right 2017    PCO replaced    • TOE SURGERY  2017    4th metasaral surgery          Current Outpatient Medications:   •  aspirin 81 MG EC tablet, Take 1 tablet by mouth Daily., Disp: 30 tablet, Rfl: 0  •  atorvastatin (LIPITOR) 20 MG tablet, Take 1 tablet by mouth Every Night., Disp: 30 tablet, Rfl: 0  •  lisinopril-hydrochlorothiazide (Zestoretic) 10-12.5 MG per tablet, Take 1 tablet by mouth Daily., Disp: 30 tablet, Rfl: 5    No Known Allergies    Family History   Problem Relation Age of Onset   • Hypertension Father    • Heart disease Father         50's   • Hypertension Maternal Grandmother    • Heart disease Maternal Grandmother        Social History     Tobacco Use   • Smoking status: Current Every Day Smoker     Packs/day: 1.00     Types: Cigarettes   • Smokeless tobacco: Never Used   Substance Use Topics   • Alcohol use: Yes     Comment: socially   "          Current Electrocardiogram:  Procedures        EMR Dragon/Transcription:   \"Dictated utilizing Dragon dictation\".         "

## 2020-10-22 ENCOUNTER — OFFICE VISIT (OUTPATIENT)
Dept: CARDIOLOGY | Facility: CLINIC | Age: 40
End: 2020-10-22

## 2020-10-22 VITALS
SYSTOLIC BLOOD PRESSURE: 137 MMHG | BODY MASS INDEX: 32.27 KG/M2 | WEIGHT: 188 LBS | HEART RATE: 76 BPM | DIASTOLIC BLOOD PRESSURE: 93 MMHG | OXYGEN SATURATION: 98 %

## 2020-10-22 DIAGNOSIS — E78.2 MIXED HYPERLIPIDEMIA: ICD-10-CM

## 2020-10-22 DIAGNOSIS — I10 ESSENTIAL HYPERTENSION: Primary | Chronic | ICD-10-CM

## 2020-10-22 DIAGNOSIS — R07.89 CHEST PAIN, ATYPICAL: ICD-10-CM

## 2020-10-22 DIAGNOSIS — F17.200 TOBACCO DEPENDENCE: Chronic | ICD-10-CM

## 2020-10-22 DIAGNOSIS — E66.09 CLASS 1 OBESITY DUE TO EXCESS CALORIES WITHOUT SERIOUS COMORBIDITY WITH BODY MASS INDEX (BMI) OF 32.0 TO 32.9 IN ADULT: Chronic | ICD-10-CM

## 2020-10-22 PROCEDURE — 99213 OFFICE O/P EST LOW 20 MIN: CPT | Performed by: NURSE PRACTITIONER

## 2020-10-22 RX ORDER — LISINOPRIL AND HYDROCHLOROTHIAZIDE 12.5; 1 MG/1; MG/1
1 TABLET ORAL DAILY
Qty: 30 TABLET | Refills: 5 | Status: SHIPPED | OUTPATIENT
Start: 2020-10-22 | End: 2020-11-05 | Stop reason: ALTCHOICE

## 2020-11-05 ENCOUNTER — TELEPHONE (OUTPATIENT)
Dept: CARDIOLOGY | Facility: CLINIC | Age: 40
End: 2020-11-05

## 2020-11-05 RX ORDER — LISINOPRIL AND HYDROCHLOROTHIAZIDE 20; 12.5 MG/1; MG/1
1 TABLET ORAL DAILY
Qty: 30 TABLET | Refills: 5 | Status: SHIPPED | OUTPATIENT
Start: 2020-11-05 | End: 2020-11-06 | Stop reason: SDUPTHER

## 2020-11-05 NOTE — TELEPHONE ENCOUNTER
Called pt to let her know that Bebe increased her BP meds after reviewing her recent readings that patient dropped off to our office.

## 2020-11-06 RX ORDER — LISINOPRIL AND HYDROCHLOROTHIAZIDE 20; 12.5 MG/1; MG/1
1 TABLET ORAL DAILY
Qty: 30 TABLET | Refills: 5 | Status: SHIPPED | OUTPATIENT
Start: 2020-11-06 | End: 2021-05-17 | Stop reason: SDUPTHER

## 2020-11-23 ENCOUNTER — OFFICE VISIT (OUTPATIENT)
Dept: CARDIOLOGY | Facility: CLINIC | Age: 40
End: 2020-11-23

## 2020-11-23 VITALS
WEIGHT: 193 LBS | HEART RATE: 87 BPM | SYSTOLIC BLOOD PRESSURE: 120 MMHG | DIASTOLIC BLOOD PRESSURE: 84 MMHG | BODY MASS INDEX: 33.13 KG/M2 | OXYGEN SATURATION: 99 %

## 2020-11-23 DIAGNOSIS — E78.2 MIXED HYPERLIPIDEMIA: ICD-10-CM

## 2020-11-23 DIAGNOSIS — R07.89 CHEST PAIN, ATYPICAL: ICD-10-CM

## 2020-11-23 DIAGNOSIS — E66.09 CLASS 1 OBESITY DUE TO EXCESS CALORIES WITHOUT SERIOUS COMORBIDITY WITH BODY MASS INDEX (BMI) OF 32.0 TO 32.9 IN ADULT: ICD-10-CM

## 2020-11-23 DIAGNOSIS — I10 ESSENTIAL HYPERTENSION: Primary | ICD-10-CM

## 2020-11-23 PROCEDURE — 99212 OFFICE O/P EST SF 10 MIN: CPT | Performed by: NURSE PRACTITIONER

## 2020-11-23 RX ORDER — MELOXICAM 15 MG/1
15 TABLET ORAL DAILY
COMMUNITY
End: 2023-02-22

## 2020-11-23 RX ORDER — HYDROCODONE BITARTRATE AND ACETAMINOPHEN 5; 325 MG/1; MG/1
1 TABLET ORAL EVERY 6 HOURS PRN
COMMUNITY
End: 2021-12-02

## 2020-11-23 NOTE — PROGRESS NOTES
[facility] CARDIOLOGY      REASON FOR FOLLOW-UP:  Chest pain  Hypertension  Dyslipidemia          Chief Complaint   Patient presents with   • Hypertension     f/u on HTN med changes recently- pt doing well.        History of Present Illness     40-year-old female with no known history of premature coronary artery disease.  The patient was evaluated at [facility] 10/6/2020 for symptoms of chest pain.  She underwent further ischemic work-up via nuclear stress testing that showed no evidence of prior myocardial injury and no evidence of inducible ischemia.  EF normal at 50%.  Blood pressure was elevated at admission with diastolic readings in the 90s.  She was discharged on hydrochlorothiazide 12.5 mg once daily.    Upon follow-up visit, she denied any further chest discomfort, shortness of breath at rest, dyspnea with exertion, orthopnea or PND.    No dizziness or lightheadedness.  No lower extremity edema.  Her blood pressure was elevated today at 137/93.  Patient had several questions about her blood pressure and genetics in terms of coronary disease.  All questions were answered to her satisfaction.    Patient was asked to call with blood pressure log in 1 week.  Those were found to be elevated and her Zestoretic was increased.  She presents today in follow-up for blood pressure and medication changes.  Her blood pressure is under excellent control today at 120/84.  She is tolerating medication well.  She denies any other complaints     Assessment:  Chest pain  Hypertension  Dyslipidemia  Obesity  Tobacco dependence     Plan:  Continue current medical regimen  Lipids at next office visit  Follow-up in 6 months or sooner if needed        The following portions of the patient's history were reviewed and updated as appropriate: allergies, current medications, past family history, past medical history, past social history, past surgical history and problem list.    REVIEW OF  SYSTEMS:    Review of Systems   Musculoskeletal: Positive for joint pain.        Recent metatarsal fracture and ankle sprain from mechanical fall   All other systems reviewed and are negative.      Vitals:    11/23/20 1339   BP: 120/84   Pulse: 87   SpO2: 99%         PHYSICAL EXAM:    General: Alert, cooperative, no distress, appears stated age  Head:  Normocephalic, atraumatic, mucous membranes moist  Eyes:  Conjunctiva/corneas clear, EOM's intact     Neck:  Supple,  no JVD or bruit     Lungs: Clear to auscultation bilaterally, no wheezes rhonchi rales are noted  Chest wall: No tenderness  Musculoskeletal:   Ambulates with difficulty due to orthosis  Heart::  Regular rate and rhythm, S1 and S2 normal, no murmur, rub or gallop  Abdomen: Soft, non-tender, nondistended, bowel sounds active, no abdominal bruit  Extremities: No cyanosis, clubbing, or edema   Pulses: 2+ and symmetric all extremities  Skin:  No rashes or lesions  Neuro/psych: A&O x3. CN II through XII are grossly intact with appropriate affect        Past Medical History:   Diagnosis Date   • Hypertension        Past Surgical History:   Procedure Laterality Date   • HIP SURGERY  2017    sherri removed    • HYSTERECTOMY  2015   • KNEE SURGERY Right 2017    PCO replaced    • TOE SURGERY  2017    4th metasaral surgery          Current Outpatient Medications:   •  aspirin 81 MG EC tablet, Take 1 tablet by mouth Daily., Disp: 30 tablet, Rfl: 0  •  atorvastatin (LIPITOR) 20 MG tablet, Take 1 tablet by mouth Every Night., Disp: 30 tablet, Rfl: 0  •  HYDROcodone-acetaminophen (NORCO) 5-325 MG per tablet, Take 1 tablet by mouth Every 6 (Six) Hours As Needed., Disp: , Rfl:   •  lisinopril-hydrochlorothiazide (Zestoretic) 20-12.5 MG per tablet, Take 1 tablet by mouth Daily., Disp: 30 tablet, Rfl: 5  •  meloxicam (MOBIC) 15 MG tablet, Take 15 mg by mouth Daily., Disp: , Rfl:     No Known Allergies    Family History   Problem Relation Age of Onset   • Hypertension Father  "   • Heart disease Father         50's   • Hypertension Maternal Grandmother    • Heart disease Maternal Grandmother        Social History     Tobacco Use   • Smoking status: Current Every Day Smoker     Packs/day: 1.00     Types: Cigarettes   • Smokeless tobacco: Never Used   Substance Use Topics   • Alcohol use: Yes     Comment: socially            Current Electrocardiogram:  Procedures        EMR Dragon/Transcription:   \"Dictated utilizing Dragon dictation\".         "

## 2021-05-17 RX ORDER — LISINOPRIL AND HYDROCHLOROTHIAZIDE 20; 12.5 MG/1; MG/1
1 TABLET ORAL DAILY
Qty: 30 TABLET | Refills: 5 | Status: SHIPPED | OUTPATIENT
Start: 2021-05-17 | End: 2021-06-02 | Stop reason: SDUPTHER

## 2021-05-24 ENCOUNTER — OFFICE VISIT (OUTPATIENT)
Dept: CARDIOLOGY | Facility: CLINIC | Age: 41
End: 2021-05-24

## 2021-05-24 VITALS
HEART RATE: 73 BPM | WEIGHT: 192 LBS | DIASTOLIC BLOOD PRESSURE: 87 MMHG | SYSTOLIC BLOOD PRESSURE: 133 MMHG | OXYGEN SATURATION: 98 % | BODY MASS INDEX: 32.96 KG/M2

## 2021-05-24 DIAGNOSIS — I10 ESSENTIAL HYPERTENSION: Primary | ICD-10-CM

## 2021-05-24 DIAGNOSIS — E66.09 CLASS 1 OBESITY DUE TO EXCESS CALORIES WITHOUT SERIOUS COMORBIDITY WITH BODY MASS INDEX (BMI) OF 32.0 TO 32.9 IN ADULT: ICD-10-CM

## 2021-05-24 DIAGNOSIS — E78.2 MIXED HYPERLIPIDEMIA: ICD-10-CM

## 2021-05-24 PROCEDURE — 93000 ELECTROCARDIOGRAM COMPLETE: CPT | Performed by: NURSE PRACTITIONER

## 2021-05-24 PROCEDURE — 99213 OFFICE O/P EST LOW 20 MIN: CPT | Performed by: NURSE PRACTITIONER

## 2021-05-24 RX ORDER — ATORVASTATIN CALCIUM 40 MG/1
40 TABLET, FILM COATED ORAL NIGHTLY
Qty: 90 TABLET | Refills: 3 | Status: SHIPPED | OUTPATIENT
Start: 2021-05-24 | End: 2022-08-25

## 2021-05-24 RX ORDER — ESCITALOPRAM OXALATE 10 MG/1
10 TABLET ORAL DAILY
COMMUNITY

## 2021-05-24 NOTE — PROGRESS NOTES
[facility] CARDIOLOGY      REASON FOR FOLLOW-UP:  Follow-up hypertension  Follow-up lipids          Chief Complaint   Patient presents with   • Hypertension     6 mo f/u no complaints         Dear Ana Rosa Doran APRN        History of Present Illness     It was my pleasure to see Ms. still in the office today.  She is a 41-year-old female with no known history of premature coronary artery disease.  The patient was evaluated at [facility] 10/6/2020 for symptoms of chest pain.  She underwent further ischemic work-up via nuclear stress testing that showed no evidence of prior myocardial injury and no evidence of inducible ischemia.  EF normal at 50%.  She has done well on her current antihypertensive.  She presents today in follow-up for the above-mentioned diagnoses.    Today, the patient reports that she feels well.  Specifically, she denies any chest pains, pressure, tightness or palpitations.  She denies any shortness of breath at rest, dyspnea with exertion, orthopnea or PND.  No lower extremity edema, dizziness or lightheadedness.  Labs at last office visit showed suboptimal lipid values with  and triglycerides 180.      ASSESSMENT:  Essential hypertension  Dyslipidemia  Exogenous obesity with BMI 33  Tobacco dependence syndrome        PLAN:  Increased statin  CMP, lipids  I will call patient for any significant outliers on labs  Continue aggressive risk factor modification  Follow-up in 6 months or sooner if needed      The following portions of the patient's history were reviewed and updated as appropriate: allergies, current medications, past family history, past medical history, past social history, past surgical history and problem list.    REVIEW OF SYSTEMS:    Review of Systems   All other systems reviewed and are negative.      Vitals:    05/24/21 1322   BP: 133/87   Pulse: 73   SpO2: 98%         PHYSICAL EXAM:    General: Alert, cooperative, no distress,  appears stated age  Head:  Normocephalic, atraumatic, mucous membranes moist  Eyes:  Conjunctiva/corneas clear, EOM's intact     Neck:  Supple,  no JVD or bruit     Lungs: Clear to auscultation bilaterally, no wheezes rhonchi rales are noted  Chest wall: No tenderness  Musculoskeletal:   Ambulates freely without assistance  Heart::  Regular rate and rhythm, S1 and S2 normal, no murmur, rub or gallop  Abdomen: Soft, non-tender, nondistended, bowel sounds active, no abdominal bruit  Extremities: No cyanosis, clubbing, or edema   Pulses: 2+ and symmetric all extremities  Skin:  No rashes or lesions  Neuro/psych: A&O x3. CN II through XII are grossly intact with appropriate affect        Past Medical History:   Diagnosis Date   • Hypertension        Past Surgical History:   Procedure Laterality Date   • HIP SURGERY  2017    sherri removed    • HYSTERECTOMY  2015   • KNEE SURGERY Right 2017    PCO replaced    • TOE SURGERY  2017    4th metasaral surgery          Current Outpatient Medications:   •  aspirin 81 MG EC tablet, Take 1 tablet by mouth Daily., Disp: 30 tablet, Rfl: 0  •  atorvastatin (LIPITOR) 20 MG tablet, Take 1 tablet by mouth Every Night., Disp: 30 tablet, Rfl: 0  •  escitalopram (LEXAPRO) 10 MG tablet, Take 10 mg by mouth Daily., Disp: , Rfl:   •  lisinopril-hydrochlorothiazide (Zestoretic) 20-12.5 MG per tablet, Take 1 tablet by mouth Daily. (Patient taking differently: Take 1 tablet by mouth Daily. Pt has taken 10- 12.5 the past two days because she ran out of the original rx), Disp: 30 tablet, Rfl: 5  •  meloxicam (MOBIC) 15 MG tablet, Take 15 mg by mouth Daily., Disp: , Rfl:   •  HYDROcodone-acetaminophen (NORCO) 5-325 MG per tablet, Take 1 tablet by mouth Every 6 (Six) Hours As Needed., Disp: , Rfl:     No Known Allergies    Family History   Problem Relation Age of Onset   • Hypertension Father    • Heart disease Father         50's   • Hypertension Maternal Grandmother    • Heart disease Maternal  "Grandmother        Social History     Tobacco Use   • Smoking status: Current Every Day Smoker     Packs/day: 1.00     Types: Cigarettes   • Smokeless tobacco: Never Used   Substance Use Topics   • Alcohol use: Yes     Comment: socially            Current Electrocardiogram:    ECG 12 Lead    Date/Time: 5/24/2021 3:23 PM  Performed by: Prachi López APRN  Authorized by: Prachi López APRN   Comparison: not compared with previous ECG   Rhythm: sinus rhythm  BPM: 73                  EMR Dragon/Transcription:   \"Dictated utilizing Dragon dictation\".       "

## 2021-06-02 RX ORDER — LISINOPRIL AND HYDROCHLOROTHIAZIDE 20; 12.5 MG/1; MG/1
1 TABLET ORAL DAILY
Qty: 30 TABLET | Refills: 5 | Status: SHIPPED | OUTPATIENT
Start: 2021-06-02 | End: 2021-11-22

## 2021-11-22 RX ORDER — LISINOPRIL AND HYDROCHLOROTHIAZIDE 20; 12.5 MG/1; MG/1
TABLET ORAL
Qty: 30 TABLET | Refills: 5 | Status: SHIPPED | OUTPATIENT
Start: 2021-11-22 | End: 2022-05-17

## 2021-12-02 ENCOUNTER — OFFICE VISIT (OUTPATIENT)
Dept: CARDIOLOGY | Facility: CLINIC | Age: 41
End: 2021-12-02

## 2021-12-02 VITALS
SYSTOLIC BLOOD PRESSURE: 131 MMHG | OXYGEN SATURATION: 97 % | WEIGHT: 192 LBS | HEIGHT: 64 IN | DIASTOLIC BLOOD PRESSURE: 93 MMHG | HEART RATE: 96 BPM | BODY MASS INDEX: 32.78 KG/M2

## 2021-12-02 DIAGNOSIS — I10 ESSENTIAL HYPERTENSION: Primary | Chronic | ICD-10-CM

## 2021-12-02 DIAGNOSIS — F17.200 TOBACCO DEPENDENCE: Chronic | ICD-10-CM

## 2021-12-02 DIAGNOSIS — E66.09 CLASS 1 OBESITY DUE TO EXCESS CALORIES WITHOUT SERIOUS COMORBIDITY WITH BODY MASS INDEX (BMI) OF 32.0 TO 32.9 IN ADULT: Chronic | ICD-10-CM

## 2021-12-02 DIAGNOSIS — E78.2 MIXED HYPERLIPIDEMIA: ICD-10-CM

## 2021-12-02 PROCEDURE — 99213 OFFICE O/P EST LOW 20 MIN: CPT | Performed by: NURSE PRACTITIONER

## 2021-12-02 PROCEDURE — 93000 ELECTROCARDIOGRAM COMPLETE: CPT | Performed by: NURSE PRACTITIONER

## 2021-12-02 RX ORDER — CYCLOBENZAPRINE HCL 5 MG
5 TABLET ORAL 3 TIMES DAILY PRN
COMMUNITY
End: 2022-06-02

## 2021-12-02 NOTE — PROGRESS NOTES
[facility] CARDIOLOGY      REASON FOR FOLLOW-UP:  Essential hypertension  Dyslipidemia          Chief Complaint   Patient presents with   • Hypertension     6 mo f/u          Dear Ana Rosa Doran APRN        History of Present Illness   It was my pleasure to see Ms. still in the office today.  She is a 41-year-old female with no known history of premature coronary artery disease.  The patient was evaluated at [facility] 10/6/2020 for symptoms of chest pain.  She underwent further ischemic work-up via nuclear stress testing that showed no evidence of prior myocardial injury and no evidence of inducible ischemia.  EF normal at 50%.  She has done well on her current antihypertensive.  She presents today in follow-up for the above-mentioned diagnoses.    Today, the patient reports that she feels well from a CV standpoint.  She denies any chest discomfort, shortness of breath, dizziness or lightheadedness.  She is having significant pain from a prior orthopedic injury to her right foot.  This likely explains her elevated blood pressure 131/93.  She is currently undergoing physical therapy.  EKG in the office shows normal sinus rhythm.  She denies any claudication, near syncopal or syncopal episodes.    ASSESSMENT:  Essential hypertension  Dyslipidemia  Exogenous obesity with BMI 33  Tobacco dependence syndrome        PLAN:  Continue current antihypertensive therapy  Follow-up in 6 months or sooner if needed  Lipids per your office        The following portions of the patient's history were reviewed and updated as appropriate: allergies, current medications, past family history, past medical history, past social history, past surgical history and problem list.    REVIEW OF SYSTEMS:    Review of Systems   Musculoskeletal: Positive for joint pain.   All other systems reviewed and are negative.      Vitals:    12/02/21 1309   BP: 131/93   Pulse: 96   SpO2: 97%         PHYSICAL  EXAM:    General: Alert, cooperative, no distress, appears stated age  Head:  Normocephalic, atraumatic, mucous membranes moist  Eyes:  Conjunctiva/corneas clear, EOM's intact     Neck:  Supple,  no JVD or bruit     Lungs: Clear to auscultation bilaterally, no wheezes rhonchi rales are noted  Chest wall: No tenderness  Musculoskeletal:   Ambulates freely without assistance  Heart::  Regular rate and rhythm, S1 and S2 normal, no murmur, rub or gallop  Abdomen: Soft, non-tender, nondistended, bowel sounds active, no abdominal bruit  Extremities: No cyanosis, clubbing, or edema   Pulses: 2+ and symmetric all extremities  Skin:  No rashes or lesions  Neuro/psych: A&O x3. CN II through XII are grossly intact with appropriate affect        Past Medical History:   Diagnosis Date   • Hypertension        Past Surgical History:   Procedure Laterality Date   • HIP SURGERY  2017    sherri removed    • HYSTERECTOMY  2015   • KNEE SURGERY Right 2017    PCO replaced    • TOE SURGERY  2017    4th metasaral surgery          Current Outpatient Medications:   •  aspirin 81 MG EC tablet, Take 1 tablet by mouth Daily., Disp: 30 tablet, Rfl: 0  •  atorvastatin (LIPITOR) 40 MG tablet, Take 1 tablet by mouth Every Night., Disp: 90 tablet, Rfl: 3  •  cyclobenzaprine (FLEXERIL) 5 MG tablet, Take 5 mg by mouth 3 (Three) Times a Day As Needed for Muscle Spasms., Disp: , Rfl:   •  escitalopram (LEXAPRO) 10 MG tablet, Take 10 mg by mouth Daily., Disp: , Rfl:   •  HYDROcodone-acetaminophen (NORCO) 5-325 MG per tablet, Take 1 tablet by mouth Every 6 (Six) Hours As Needed., Disp: , Rfl:   •  lisinopril-hydrochlorothiazide (PRINZIDE,ZESTORETIC) 20-12.5 MG per tablet, TAKE 1 TABLET BY MOUTH EVERY DAY, Disp: 30 tablet, Rfl: 5  •  meloxicam (MOBIC) 15 MG tablet, Take 15 mg by mouth Daily., Disp: , Rfl:     No Known Allergies    Family History   Problem Relation Age of Onset   • Hypertension Father    • Heart disease Father         50's   • Hypertension  "Maternal Grandmother    • Heart disease Maternal Grandmother        Social History     Tobacco Use   • Smoking status: Current Every Day Smoker     Packs/day: 1.00     Types: Cigarettes   • Smokeless tobacco: Never Used   Substance Use Topics   • Alcohol use: Yes     Comment: socially            Current Electrocardiogram:    ECG 12 Lead    Date/Time: 12/2/2021 1:31 PM  Performed by: Prachi López APRN  Authorized by: Prachi López APRN   Comparison: not compared with previous ECG   Rhythm: sinus rhythm  BPM: 96                  EMR Dragon/Transcription:   \"Dictated utilizing Dragon dictation\".       "

## 2022-05-17 RX ORDER — LISINOPRIL AND HYDROCHLOROTHIAZIDE 20; 12.5 MG/1; MG/1
TABLET ORAL
Qty: 30 TABLET | Refills: 5 | Status: SHIPPED | OUTPATIENT
Start: 2022-05-17 | End: 2022-11-22

## 2022-06-02 ENCOUNTER — OFFICE VISIT (OUTPATIENT)
Dept: CARDIOLOGY | Facility: CLINIC | Age: 42
End: 2022-06-02

## 2022-06-02 VITALS
DIASTOLIC BLOOD PRESSURE: 85 MMHG | SYSTOLIC BLOOD PRESSURE: 126 MMHG | HEIGHT: 64 IN | HEART RATE: 86 BPM | OXYGEN SATURATION: 99 % | BODY MASS INDEX: 36.54 KG/M2 | WEIGHT: 214 LBS

## 2022-06-02 DIAGNOSIS — I10 ESSENTIAL HYPERTENSION: Chronic | ICD-10-CM

## 2022-06-02 DIAGNOSIS — E78.2 MIXED HYPERLIPIDEMIA: Primary | ICD-10-CM

## 2022-06-02 PROCEDURE — 99213 OFFICE O/P EST LOW 20 MIN: CPT | Performed by: NURSE PRACTITIONER

## 2022-06-02 PROCEDURE — 93000 ELECTROCARDIOGRAM COMPLETE: CPT | Performed by: NURSE PRACTITIONER

## 2022-06-02 RX ORDER — BACLOFEN 10 MG/1
10 TABLET ORAL 3 TIMES DAILY
COMMUNITY

## 2022-06-02 RX ORDER — GABAPENTIN 800 MG/1
800 TABLET ORAL 3 TIMES DAILY
COMMUNITY
End: 2023-02-22

## 2022-06-02 NOTE — PROGRESS NOTES
[facility] CARDIOLOGY      REASON FOR FOLLOW-UP:  Essential hypertension  Dyslipidemia          Chief Complaint   Patient presents with   • Hypertension         Dear Ana Rosa Doran APRN        History of Present Illness   It was my pleasure to see Ms. still in the office today.  She is a 42-year-old female with no known history of premature coronary artery disease.  The patient was evaluated at [facility] 10/6/2020 for symptoms of chest pain.  She underwent further ischemic work-up via nuclear stress testing that showed no evidence of prior myocardial injury and no evidence of inducible ischemia.  EF normal at 50%.  She has done well on her current antihypertensive.  She presents today in follow-up for the above-mentioned diagnoses.    Today, the patient reports that she feels well.  She denies any complaints of chest pain, pressure, tightness or palpitations.  She denies any shortness of breath at rest, dyspnea with exertion, orthopnea or PND.  She has had no lower extremity edema.  Blood pressure is under excellent control at 126/85.  EKG shows normal sinus rhythm.      ASSESSMENT:  Essential hypertension  Dyslipidemia  Exogenous obesity with BMI 33  Tobacco dependence syndrome        PLAN:  Continue current antihypertensive therapy  Follow-up in 6 months or sooner if needed          The following portions of the patient's history were reviewed and updated as appropriate: allergies, current medications, past family history, past medical history, past social history, past surgical history and problem list.    REVIEW OF SYSTEMS:    Review of Systems   All other systems reviewed and are negative.      Vitals:    06/02/22 1304   BP: 126/85   Pulse: 86   SpO2: 99%         PHYSICAL EXAM:    General: Alert, cooperative, no distress, appears stated age  Head:  Normocephalic, atraumatic, mucous membranes moist  Eyes:  Conjunctiva/corneas clear, EOM's intact     Neck:  Supple,  no JVD  or bruit     Lungs: Clear to auscultation bilaterally, no wheezes rhonchi rales are noted  Chest wall: No tenderness  Musculoskeletal:   Ambulates freely without assistance  Heart::  Regular rate and rhythm, S1 and S2 normal, no murmur, rub or gallop  Abdomen: Soft, non-tender, nondistended, bowel sounds active, no abdominal bruit  Extremities: No cyanosis, clubbing, or edema   Pulses: 2+ and symmetric all extremities  Skin:  No rashes or lesions  Neuro/psych: A&O x3. CN II through XII are grossly intact with appropriate affect        Past Medical History:   Diagnosis Date   • Hypertension        Past Surgical History:   Procedure Laterality Date   • HIP SURGERY  2017    sherri removed    • HYSTERECTOMY  2015   • KNEE SURGERY Right 2017    PCO replaced    • TOE SURGERY  2017    4th metasaral surgery          Current Outpatient Medications:   •  aspirin 81 MG EC tablet, Take 1 tablet by mouth Daily., Disp: 30 tablet, Rfl: 0  •  atorvastatin (LIPITOR) 40 MG tablet, Take 1 tablet by mouth Every Night., Disp: 90 tablet, Rfl: 3  •  baclofen (LIORESAL) 10 MG tablet, Take 10 mg by mouth 3 (Three) Times a Day., Disp: , Rfl:   •  escitalopram (LEXAPRO) 10 MG tablet, Take 10 mg by mouth Daily., Disp: , Rfl:   •  gabapentin (NEURONTIN) 800 MG tablet, Take 800 mg by mouth 3 (Three) Times a Day., Disp: , Rfl:   •  lisinopril-hydrochlorothiazide (PRINZIDE,ZESTORETIC) 20-12.5 MG per tablet, TAKE 1 TABLET BY MOUTH EVERY DAY, Disp: 30 tablet, Rfl: 5  •  meloxicam (MOBIC) 15 MG tablet, Take 15 mg by mouth Daily., Disp: , Rfl:   •  cyclobenzaprine (FLEXERIL) 5 MG tablet, Take 5 mg by mouth 3 (Three) Times a Day As Needed for Muscle Spasms., Disp: , Rfl:     No Known Allergies    Family History   Problem Relation Age of Onset   • Hypertension Father    • Heart disease Father         50's   • Hypertension Maternal Grandmother    • Heart disease Maternal Grandmother        Social History     Tobacco Use   • Smoking status: Current Every Day  "Smoker     Packs/day: 1.00     Types: Cigarettes   • Smokeless tobacco: Never Used   Substance Use Topics   • Alcohol use: Yes     Comment: socially            Current Electrocardiogram:    ECG 12 Lead    Date/Time: 6/2/2022 4:53 PM  Performed by: Prachi López APRN  Authorized by: Prachi López APRN   Comparison: not compared with previous ECG   Rhythm: sinus rhythm  BPM: 86                  EMR Dragon/Transcription:   \"Dictated utilizing Dragon dictation\".       "

## 2022-06-03 ENCOUNTER — PREP FOR SURGERY (OUTPATIENT)
Dept: OTHER | Facility: HOSPITAL | Age: 42
End: 2022-06-03

## 2022-06-08 DIAGNOSIS — I10 ESSENTIAL HYPERTENSION: Primary | ICD-10-CM

## 2022-06-08 DIAGNOSIS — E78.2 MIXED HYPERLIPIDEMIA: ICD-10-CM

## 2022-08-25 RX ORDER — ATORVASTATIN CALCIUM 40 MG/1
TABLET, FILM COATED ORAL
Qty: 90 TABLET | Refills: 3 | Status: SHIPPED | OUTPATIENT
Start: 2022-08-25

## 2022-11-22 RX ORDER — LISINOPRIL AND HYDROCHLOROTHIAZIDE 20; 12.5 MG/1; MG/1
TABLET ORAL
Qty: 30 TABLET | Refills: 5 | Status: SHIPPED | OUTPATIENT
Start: 2022-11-22

## 2022-12-27 ENCOUNTER — OFFICE VISIT (OUTPATIENT)
Dept: PODIATRY | Facility: CLINIC | Age: 42
End: 2022-12-27

## 2022-12-27 VITALS — RESPIRATION RATE: 20 BRPM | BODY MASS INDEX: 36.54 KG/M2 | HEIGHT: 64 IN | WEIGHT: 214 LBS

## 2022-12-27 DIAGNOSIS — M79.671 CHRONIC FOOT PAIN, RIGHT: Primary | ICD-10-CM

## 2022-12-27 DIAGNOSIS — G89.29 CHRONIC FOOT PAIN, RIGHT: Primary | ICD-10-CM

## 2022-12-27 DIAGNOSIS — G57.71 COMPLEX REGIONAL PAIN SYNDROME TYPE 2 OF RIGHT LOWER EXTREMITY: ICD-10-CM

## 2022-12-27 DIAGNOSIS — M21.41 ACQUIRED PES PLANUS, RIGHT: ICD-10-CM

## 2022-12-27 DIAGNOSIS — M19.071 ARTHRITIS OF RIGHT FOOT: ICD-10-CM

## 2022-12-27 DIAGNOSIS — M21.861 ACQUIRED POSTERIOR EQUINUS, RIGHT: ICD-10-CM

## 2022-12-27 PROCEDURE — 99203 OFFICE O/P NEW LOW 30 MIN: CPT | Performed by: PODIATRIST

## 2022-12-27 RX ORDER — CYCLOBENZAPRINE HCL 5 MG
5 TABLET ORAL
COMMUNITY

## 2022-12-27 RX ORDER — ALBUTEROL SULFATE 90 UG/1
AEROSOL, METERED RESPIRATORY (INHALATION)
COMMUNITY
Start: 2022-12-15

## 2022-12-27 RX ORDER — DEXTROMETHORPHAN HYDROBROMIDE AND PROMETHAZINE HYDROCHLORIDE 15; 6.25 MG/5ML; MG/5ML
SYRUP ORAL
COMMUNITY
Start: 2022-12-15
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
## 2022-12-30 ENCOUNTER — PATIENT ROUNDING (BHMG ONLY) (OUTPATIENT)
Dept: PODIATRY | Facility: CLINIC | Age: 42
End: 2022-12-30

## 2022-12-30 NOTE — PROGRESS NOTES
A my chart message has been sent to the patient for PATIENT ROUNDING with [facility]

## 2023-01-04 ENCOUNTER — TELEPHONE (OUTPATIENT)
Dept: SPORTS MEDICINE | Facility: CLINIC | Age: 43
End: 2023-01-04
Payer: MEDICAID

## 2023-01-04 NOTE — TELEPHONE ENCOUNTER
Caller: KYLAH LOCKETT    Relationship to patient: SELF    Best call back number: [phone]    Type of visit: NEW PT    If rescheduling, when is the original appointment: 1-10-23     Additional notes: PT CALLED TO RESCHEDULE 1-10-23 APPT DUE TO CONFLICTING APPTS. PT WAS RESCHEDULED FOR 1-17-23, IF DR FONG WISHES TO SEE SOONER, PT MAY NEED TO BE WORKED IN. PT ORIGINALLY SCHEDULED BY PRACTICE, NO REFERRAL IN CHART

## 2023-01-04 NOTE — TELEPHONE ENCOUNTER
Caller: Vidya Becerra    Relationship to patient: Self    Best call back number: [phone]    Patient is needing: PATIENT WAS RETURNING A MISSED CALL FROM GORDY AT THE FRONT OFFICE. I ATTEMPTED TO WARM TRANSFER CALL TO THE OFFICE BUT RECEIVED NO ANSWER. THANK YOU!

## 2023-01-16 ENCOUNTER — TREATMENT (OUTPATIENT)
Dept: PHYSICAL THERAPY | Facility: CLINIC | Age: 43
End: 2023-01-16
Payer: MEDICAID

## 2023-01-16 DIAGNOSIS — M19.071 ARTHRITIS OF RIGHT FOOT: Primary | ICD-10-CM

## 2023-01-16 DIAGNOSIS — R26.9 GAIT DISTURBANCE: ICD-10-CM

## 2023-01-16 DIAGNOSIS — M79.671 RIGHT FOOT PAIN: ICD-10-CM

## 2023-01-16 DIAGNOSIS — S93.324S DISLOCATION OF TARSOMETATARSAL JOINT OF RIGHT FOOT, SEQUELA: ICD-10-CM

## 2023-01-16 DIAGNOSIS — G90.521 COMPLEX REGIONAL PAIN SYNDROME TYPE 1 OF RIGHT LOWER EXTREMITY: ICD-10-CM

## 2023-01-16 PROCEDURE — 97163 PT EVAL HIGH COMPLEX 45 MIN: CPT | Performed by: PHYSICAL THERAPIST
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
## 2023-01-17 ENCOUNTER — OFFICE VISIT (OUTPATIENT)
Dept: ORTHOPEDIC SURGERY | Facility: CLINIC | Age: 43
End: 2023-01-17
Payer: MEDICAID

## 2023-01-17 VITALS — BODY MASS INDEX: 36.73 KG/M2 | HEART RATE: 93 BPM | WEIGHT: 214 LBS | OXYGEN SATURATION: 99 %

## 2023-01-17 DIAGNOSIS — S83.521S RUPTURE OF POSTERIOR CRUCIATE LIGAMENT OF RIGHT KNEE, SEQUELA: ICD-10-CM

## 2023-01-17 DIAGNOSIS — M25.361 KNEE INSTABILITY, RIGHT: ICD-10-CM

## 2023-01-17 DIAGNOSIS — M25.561 ACUTE PAIN OF RIGHT KNEE: Primary | ICD-10-CM

## 2023-01-17 PROCEDURE — 99214 OFFICE O/P EST MOD 30 MIN: CPT | Performed by: STUDENT IN AN ORGANIZED HEALTH CARE EDUCATION/TRAINING PROGRAM
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
## 2023-01-19 ENCOUNTER — HOSPITAL ENCOUNTER (OUTPATIENT)
Dept: CT IMAGING | Facility: HOSPITAL | Age: 43
Discharge: HOME | End: 2023-01-19
Admitting: PODIATRIST
Payer: MEDICAID

## 2023-01-19 DIAGNOSIS — M19.071 ARTHRITIS OF RIGHT FOOT: ICD-10-CM

## 2023-01-19 DIAGNOSIS — G89.29 CHRONIC FOOT PAIN, RIGHT: ICD-10-CM

## 2023-01-19 DIAGNOSIS — M79.671 CHRONIC FOOT PAIN, RIGHT: ICD-10-CM

## 2023-01-19 PROCEDURE — 73700 CT LOWER EXTREMITY W/O DYE: CPT

## 2023-01-20 ENCOUNTER — PATIENT ROUNDING (BHMG ONLY) (OUTPATIENT)
Dept: SPORTS MEDICINE | Facility: CLINIC | Age: 43
End: 2023-01-20
Payer: MEDICAID

## 2023-01-31 ENCOUNTER — TELEPHONE (OUTPATIENT)
Dept: PHYSICAL THERAPY | Facility: CLINIC | Age: 43
End: 2023-01-31

## 2023-01-31 ENCOUNTER — OFFICE VISIT (OUTPATIENT)
Dept: PODIATRY | Facility: CLINIC | Age: 43
End: 2023-01-31
Payer: MEDICAID

## 2023-01-31 VITALS — HEART RATE: 82 BPM | BODY MASS INDEX: 36.54 KG/M2 | OXYGEN SATURATION: 97 % | HEIGHT: 64 IN | WEIGHT: 214 LBS

## 2023-01-31 DIAGNOSIS — M19.071 ARTHRITIS OF RIGHT FOOT: ICD-10-CM

## 2023-01-31 DIAGNOSIS — M21.861 ACQUIRED POSTERIOR EQUINUS, RIGHT: ICD-10-CM

## 2023-01-31 DIAGNOSIS — G57.71 COMPLEX REGIONAL PAIN SYNDROME TYPE 2 OF RIGHT LOWER EXTREMITY: ICD-10-CM

## 2023-01-31 DIAGNOSIS — M79.671 CHRONIC FOOT PAIN, RIGHT: Primary | ICD-10-CM

## 2023-01-31 DIAGNOSIS — S92.324K: ICD-10-CM

## 2023-01-31 DIAGNOSIS — M21.41 ACQUIRED PES PLANUS, RIGHT: ICD-10-CM

## 2023-01-31 DIAGNOSIS — G89.29 CHRONIC FOOT PAIN, RIGHT: Primary | ICD-10-CM

## 2023-01-31 PROCEDURE — 99213 OFFICE O/P EST LOW 20 MIN: CPT | Performed by: PODIATRIST

## 2023-01-31 RX ORDER — PANTOPRAZOLE SODIUM 40 MG/1
TABLET, DELAYED RELEASE ORAL
COMMUNITY
Start: 2023-01-25

## 2023-02-02 ENCOUNTER — TREATMENT (OUTPATIENT)
Dept: PHYSICAL THERAPY | Facility: CLINIC | Age: 43
End: 2023-02-02
Payer: MEDICAID

## 2023-02-02 DIAGNOSIS — S93.324S DISLOCATION OF TARSOMETATARSAL JOINT OF RIGHT FOOT, SEQUELA: ICD-10-CM

## 2023-02-02 DIAGNOSIS — R26.9 GAIT DISTURBANCE: ICD-10-CM

## 2023-02-02 DIAGNOSIS — M79.671 RIGHT FOOT PAIN: ICD-10-CM

## 2023-02-02 DIAGNOSIS — G90.521 COMPLEX REGIONAL PAIN SYNDROME TYPE 1 OF RIGHT LOWER EXTREMITY: ICD-10-CM

## 2023-02-02 DIAGNOSIS — M19.071 ARTHRITIS OF RIGHT FOOT: Primary | ICD-10-CM

## 2023-02-02 PROCEDURE — 97112 NEUROMUSCULAR REEDUCATION: CPT | Performed by: PHYSICAL THERAPIST

## 2023-02-02 PROCEDURE — 97110 THERAPEUTIC EXERCISES: CPT | Performed by: PHYSICAL THERAPIST

## 2023-02-02 PROCEDURE — 97014 ELECTRIC STIMULATION THERAPY: CPT | Performed by: PHYSICAL THERAPIST

## 2023-02-02 NOTE — PROGRESS NOTES
Physical Therapy Daily Treatment Note      Patient: Vidya Becerra   : 1980  Diagnosis/ICD-10 Code:  Arthritis of right foot [M19.071]   Problems Addressed this Visit    None  Visit Diagnoses     Arthritis of right foot    -  Primary    Right foot pain        Dislocation of tarsometatarsal joint of right foot, sequela        Gait disturbance        Complex regional pain syndrome type 1 of right lower extremity          Diagnoses       Codes Comments    Arthritis of right foot    -  Primary ICD-10-CM: M19.071  ICD-9-CM: 716.97     Right foot pain     ICD-10-CM: M79.671  ICD-9-CM: 729.5     Dislocation of tarsometatarsal joint of right foot, sequela     ICD-10-CM: S93.324S  ICD-9-CM: 905.6     Gait disturbance     ICD-10-CM: R26.9  ICD-9-CM: 781.2     Complex regional pain syndrome type 1 of right lower extremity     ICD-10-CM: G90.521  ICD-9-CM: 337.22         Referring practitioner: JESSICA Malloy DPM  Date of Initial Visit: Type: THERAPY  Noted: 2023  Today's Date: 2023    VISIT#: 2    Subjective : Pt reports she saw Dr. Malloy on Tues and will likely be having surgery at a later date. Hopes to start pain management soon. States her  had ankle surgery this past Mon and that is why her surgery will be put off.       Objective : Added rocker board seated for ankle ROM and leg press. Added HS curls.  Increased pain after exercise. Ended with ice and e-stim.  See Exercise, Manual, and Modality Logs for complete treatment.     Assessment/Plan : Continued pain R foot. Poor tolerance to ther ex progression with increased pain reported after completing ther ex and increased antalgic gait. Assess response to e-stim next visit. Pt needs continued R ankle and knee strengthening for improved wt bearing tolerance and improved gait pattern and functional mobility.    Goals  Plan Goals: STG to be met in 3 wk:  - Increase R ankle DF AROM to 8 deg.  - PT to assess SLS in 3-4 visits.  - Pt to tolerate NuStep  x10 min.  - Pt to be independent with HEP in 3 weeks.  LTG to be met in 12 wk:  - Improve FAAM score to 80% or better ability.  - Increase R ankle strength to 4+/5 in all directions in order to walk in her yard safely.  - Pt to negotiate stairs with reciprocal pattern and 1 handrail to negotiate in community.  - Pt to ambulate in clinic without ankle brace with continuous pattern and R heel strike and toe off in order to walk in her home to complete housework.    Progress per Plan of Care and Progress strengthening /stabilization /functional activity         Timed:         Manual Therapy:    5     mins  76550;     Therapeutic Exercise:    15     mins  94214;     Neuromuscular Annamarie:    8    mins  42738;    Therapeutic Activity:          mins  77391;     Gait Training:           mins  63616;     Ultrasound:          mins  78607;    Ionto                                   mins   25156  Self Care                            mins   26702    Un-Timed:  Electrical Stimulation:    15     mins  14240 ( );  Dry Needling          mins 69471/39281  Traction          mins 49973  Canalith Repos                   mins  80077  Low Eval          Mins  36911  Mod Eval          Mins  18662  High Eval                            Mins  17542  Re-Eval                               mins  75526    Timed Treatment:   28   mins   Total Treatment:     43   mins    Jazmín Marcus, PT, CLT, Cert DN  Physical Therapist  [place] Lic # [id number]

## 2023-02-06 ENCOUNTER — TREATMENT (OUTPATIENT)
Dept: PHYSICAL THERAPY | Facility: CLINIC | Age: 43
End: 2023-02-06
Payer: MEDICAID

## 2023-02-06 DIAGNOSIS — S93.324S DISLOCATION OF TARSOMETATARSAL JOINT OF RIGHT FOOT, SEQUELA: ICD-10-CM

## 2023-02-06 DIAGNOSIS — R26.9 GAIT DISTURBANCE: ICD-10-CM

## 2023-02-06 DIAGNOSIS — M79.671 RIGHT FOOT PAIN: ICD-10-CM

## 2023-02-06 DIAGNOSIS — G90.521 COMPLEX REGIONAL PAIN SYNDROME TYPE 1 OF RIGHT LOWER EXTREMITY: ICD-10-CM

## 2023-02-06 DIAGNOSIS — M19.071 ARTHRITIS OF RIGHT FOOT: Primary | ICD-10-CM

## 2023-02-06 PROCEDURE — 97112 NEUROMUSCULAR REEDUCATION: CPT | Performed by: PHYSICAL THERAPIST

## 2023-02-06 PROCEDURE — 97110 THERAPEUTIC EXERCISES: CPT | Performed by: PHYSICAL THERAPIST

## 2023-02-06 PROCEDURE — 97014 ELECTRIC STIMULATION THERAPY: CPT | Performed by: PHYSICAL THERAPIST

## 2023-02-06 NOTE — PROGRESS NOTES
Physical Therapy Daily Treatment Note      Patient: Vidya Becerra   : 1980  Diagnosis/ICD-10 Code:  Arthritis of right foot [M19.071]   Problems Addressed this Visit    None  Visit Diagnoses     Arthritis of right foot    -  Primary    Right foot pain        Dislocation of tarsometatarsal joint of right foot, sequela        Gait disturbance        Complex regional pain syndrome type 1 of right lower extremity          Diagnoses       Codes Comments    Arthritis of right foot    -  Primary ICD-10-CM: M19.071  ICD-9-CM: 716.97     Right foot pain     ICD-10-CM: M79.671  ICD-9-CM: 729.5     Dislocation of tarsometatarsal joint of right foot, sequela     ICD-10-CM: S93.324S  ICD-9-CM: 905.6     Gait disturbance     ICD-10-CM: R26.9  ICD-9-CM: 781.2     Complex regional pain syndrome type 1 of right lower extremity     ICD-10-CM: G90.521  ICD-9-CM: 337.22         Referring practitioner: JESSICA Malloy DPM  Date of Initial Visit: Type: THERAPY  Noted: 2023  Today's Date: 2023    VISIT#: 3    Subjective : pt reports her foot was more sore after last visit for the evening but back to normal the next morning. States e-stim did seem to help last visit. Switched to tennis shoes and feels like they are more comfortable to wear. States she limps because it feels like there is a boulder sitting on top of her foot when she has all wt on R foot.    Objective : pt wearing tennis shoes with inserts.   Added Nustep at easy pace for movement of R knee and ankle.    See Exercise, Manual, and Modality Logs for complete treatment.     Assessment/Plan : Continued pain R foot with antalgic gait. Fair tolerance to ther ex with increased antalgic pattern after ther ex. Pt needs continued R ankle and knee strengthening for improved wt bearing tolerance and improved gait pattern and functional mobility. Plan to progress wt bearing activities as tolerated and progress to SLS.     Goals  Plan Goals: STG to be met in 3 wk:  -  Increase R ankle DF AROM to 8 deg.  - PT to assess SLS in 3-4 visits.  - Pt to tolerate NuStep x10 min.  - Pt to be independent with HEP in 3 weeks.  LTG to be met in 12 wk:  - Improve FAAM score to 80% or better ability.  - Increase R ankle strength to 4+/5 in all directions in order to walk in her yard safely.  - Pt to negotiate stairs with reciprocal pattern and 1 handrail to negotiate in community.  - Pt to ambulate in clinic without ankle brace with continuous pattern and R heel strike and toe off in order to walk in her home to complete housework.    Progress per Plan of Care and Progress strengthening /stabilization /functional activity         Timed:         Manual Therapy:    5     mins  14649;     Therapeutic Exercise:    23     mins  57903;     Neuromuscular Annamarie:    10    mins  66465;    Therapeutic Activity:          mins  37823;     Gait Training:           mins  37224;     Ultrasound:          mins  50487;    Ionto                                   mins   08190  Self Care                            mins   32456    Un-Timed:  Electrical Stimulation:    15     mins  29461 ( );  Dry Needling          mins 40762/20560  Traction          mins 46730  Canalith Repos                   mins  49096  Low Eval          Mins  96761  Mod Eval          Mins  64731  High Eval                            Mins  69153  Re-Eval                               mins  78843    Timed Treatment:   38   mins   Total Treatment:     43   mins    Jazmín Marcus, PT, CLT, Cert DN  Physical Therapist  [place] Lic # [id number]

## 2023-02-09 ENCOUNTER — TREATMENT (OUTPATIENT)
Dept: PHYSICAL THERAPY | Facility: CLINIC | Age: 43
End: 2023-02-09
Payer: MEDICAID

## 2023-02-09 ENCOUNTER — HOSPITAL ENCOUNTER (OUTPATIENT)
Dept: MRI IMAGING | Facility: HOSPITAL | Age: 43
Discharge: HOME | End: 2023-02-09
Admitting: STUDENT IN AN ORGANIZED HEALTH CARE EDUCATION/TRAINING PROGRAM
Payer: MEDICAID

## 2023-02-09 DIAGNOSIS — M79.671 RIGHT FOOT PAIN: ICD-10-CM

## 2023-02-09 DIAGNOSIS — R26.9 GAIT DISTURBANCE: ICD-10-CM

## 2023-02-09 DIAGNOSIS — M19.071 ARTHRITIS OF RIGHT FOOT: Primary | ICD-10-CM

## 2023-02-09 DIAGNOSIS — M25.361 KNEE INSTABILITY, RIGHT: ICD-10-CM

## 2023-02-09 DIAGNOSIS — M25.561 ACUTE PAIN OF RIGHT KNEE: ICD-10-CM

## 2023-02-09 DIAGNOSIS — G90.521 COMPLEX REGIONAL PAIN SYNDROME TYPE 1 OF RIGHT LOWER EXTREMITY: ICD-10-CM

## 2023-02-09 DIAGNOSIS — S93.324S DISLOCATION OF TARSOMETATARSAL JOINT OF RIGHT FOOT, SEQUELA: ICD-10-CM

## 2023-02-09 DIAGNOSIS — S83.521S RUPTURE OF POSTERIOR CRUCIATE LIGAMENT OF RIGHT KNEE, SEQUELA: ICD-10-CM

## 2023-02-09 PROCEDURE — 97014 ELECTRIC STIMULATION THERAPY: CPT | Performed by: PHYSICAL THERAPIST

## 2023-02-09 PROCEDURE — 73721 MRI JNT OF LWR EXTRE W/O DYE: CPT

## 2023-02-09 PROCEDURE — 97110 THERAPEUTIC EXERCISES: CPT | Performed by: PHYSICAL THERAPIST

## 2023-02-09 PROCEDURE — 97112 NEUROMUSCULAR REEDUCATION: CPT | Performed by: PHYSICAL THERAPIST

## 2023-02-09 NOTE — PROGRESS NOTES
Physical Therapy Daily Treatment Note      Patient: Vidya Becerra   : 1980  Diagnosis/ICD-10 Code:  Arthritis of right foot [M19.071]   Problems Addressed this Visit    None  Visit Diagnoses     Arthritis of right foot    -  Primary    Right foot pain        Dislocation of tarsometatarsal joint of right foot, sequela        Gait disturbance        Complex regional pain syndrome type 1 of right lower extremity          Diagnoses       Codes Comments    Arthritis of right foot    -  Primary ICD-10-CM: M19.071  ICD-9-CM: 716.97     Right foot pain     ICD-10-CM: M79.671  ICD-9-CM: 729.5     Dislocation of tarsometatarsal joint of right foot, sequela     ICD-10-CM: S93.324S  ICD-9-CM: 905.6     Gait disturbance     ICD-10-CM: R26.9  ICD-9-CM: 781.2     Complex regional pain syndrome type 1 of right lower extremity     ICD-10-CM: G90.521  ICD-9-CM: 337.22         Referring practitioner: JESSICA Malloy DPM  Date of Initial Visit: Type: THERAPY  Noted: 2023  Today's Date: 2023    VISIT#: 4    Subjective : Pt reports she was sore after last visit. Had to do grocery shopping for her mom yesterday and that much walking was difficult. Had R knee MRI this morning.      Objective : Added modified SLS this date. Pt reported R knee pain during this. PT provided posterior support to decrease hyperextension and pt reported improved comfort.    See Exercise, Manual, and Modality Logs for complete treatment.     Assessment/Plan : Continued pain R foot with antalgic gait. Fair tolerance to ther ex with increased antalgic pattern after ther ex. Pt needs continued R ankle and knee strengthening for improved wt bearing tolerance and improved gait pattern and functional mobility. Plan to progress wt bearing activities as tolerated and progress to SLS.     Goals  Plan Goals: STG to be met in 3 wk:  - Increase R ankle DF AROM to 8 deg.  - PT to assess SLS in 3-4 visits.  - Pt to tolerate NuStep x10 min.  - Pt to be  independent with HEP in 3 weeks.  LTG to be met in 12 wk:  - Improve FAAM score to 80% or better ability.  - Increase R ankle strength to 4+/5 in all directions in order to walk in her yard safely.  - Pt to negotiate stairs with reciprocal pattern and 1 handrail to negotiate in community.  - Pt to ambulate in clinic without ankle brace with continuous pattern and R heel strike and toe off in order to walk in her home to complete housework.    Progress per Plan of Care and Progress strengthening /stabilization /functional activity         Timed:         Manual Therapy:    5     mins  98419;     Therapeutic Exercise:    25     mins  93073;     Neuromuscular Annamarie:    10    mins  85943;    Therapeutic Activity:          mins  85023;     Gait Training:           mins  52306;     Ultrasound:          mins  24642;    Ionto                                   mins   52740  Self Care                            mins   97208    Un-Timed:  Electrical Stimulation:    15     mins  01166 ( );  Dry Needling          mins 44371/84153  Traction          mins 73641  Canalith Repos                   mins  63353  Low Eval          Mins  60459  Mod Eval          Mins  34773  High Eval                            Mins  47585  Re-Eval                               mins  81156    Timed Treatment:   40   mins   Total Treatment:     55   mins    Jazmín Marcus, PT, CLT, Cert DN  Physical Therapist  [place] Lic # [id number]

## 2023-02-13 ENCOUNTER — TREATMENT (OUTPATIENT)
Dept: PHYSICAL THERAPY | Facility: CLINIC | Age: 43
End: 2023-02-13
Payer: MEDICAID

## 2023-02-13 ENCOUNTER — TELEPHONE (OUTPATIENT)
Dept: SPORTS MEDICINE | Facility: CLINIC | Age: 43
End: 2023-02-13
Payer: MEDICAID

## 2023-02-13 DIAGNOSIS — R26.9 GAIT DISTURBANCE: ICD-10-CM

## 2023-02-13 DIAGNOSIS — M79.671 RIGHT FOOT PAIN: ICD-10-CM

## 2023-02-13 DIAGNOSIS — M19.071 ARTHRITIS OF RIGHT FOOT: Primary | ICD-10-CM

## 2023-02-13 DIAGNOSIS — S93.324S DISLOCATION OF TARSOMETATARSAL JOINT OF RIGHT FOOT, SEQUELA: ICD-10-CM

## 2023-02-13 DIAGNOSIS — G90.521 COMPLEX REGIONAL PAIN SYNDROME TYPE 1 OF RIGHT LOWER EXTREMITY: ICD-10-CM

## 2023-02-13 PROCEDURE — 97014 ELECTRIC STIMULATION THERAPY: CPT | Performed by: PHYSICAL THERAPIST

## 2023-02-13 PROCEDURE — 97110 THERAPEUTIC EXERCISES: CPT | Performed by: PHYSICAL THERAPIST

## 2023-02-13 PROCEDURE — 97530 THERAPEUTIC ACTIVITIES: CPT | Performed by: PHYSICAL THERAPIST

## 2023-02-13 NOTE — PROGRESS NOTES
Physical Therapy Daily Treatment Note      Patient: Vidya Becerra   : 1980  Diagnosis/ICD-10 Code:  Arthritis of right foot [M19.071]   Problems Addressed this Visit    None  Visit Diagnoses     Arthritis of right foot    -  Primary    Right foot pain        Dislocation of tarsometatarsal joint of right foot, sequela        Gait disturbance        Complex regional pain syndrome type 1 of right lower extremity          Diagnoses       Codes Comments    Arthritis of right foot    -  Primary ICD-10-CM: M19.071  ICD-9-CM: 716.97     Right foot pain     ICD-10-CM: M79.671  ICD-9-CM: 729.5     Dislocation of tarsometatarsal joint of right foot, sequela     ICD-10-CM: S93.324S  ICD-9-CM: 905.6     Gait disturbance     ICD-10-CM: R26.9  ICD-9-CM: 781.2     Complex regional pain syndrome type 1 of right lower extremity     ICD-10-CM: G90.521  ICD-9-CM: 337.22         Referring practitioner: JESSICA Malloy DPM  Date of Initial Visit: Type: THERAPY  Noted: 2023  Today's Date: 2023    VISIT#: 5    Subjective : Pt reports she had increased pain for remainder of day after last PT session and was back to normal level the next day. Pt states R knee MRI shows that her PCL is intact. She is supposed to call ortho to schedule f/u appt.    Objective : R hip strength grossly 3+/5 flex, abd, and ext.  Added hip 3-way on mat to ther ex and issued copy for HEP.     See Exercise, Manual, and Modality Logs for complete treatment.     Assessment/Plan : Continued pain R foot with antalgic gait. Fair tolerance to ther ex with increased antalgic pattern after ther ex. Pt needs continued R ankle and knee strengthening for improved wt bearing tolerance and improved gait pattern and functional mobility. Plan to progress wt bearing activities as tolerated and progress to SLS.     Goals  Plan Goals: STG to be met in 3 wk:  - Increase R ankle DF AROM to 8 deg.  - PT to assess SLS in 3-4 visits.  - Pt to tolerate NuStep x10 min.  -  Pt to be independent with HEP in 3 weeks.  LTG to be met in 12 wk:  - Improve FAAM score to 80% or better ability.  - Increase R ankle strength to 4+/5 in all directions in order to walk in her yard safely.  - Pt to negotiate stairs with reciprocal pattern and 1 handrail to negotiate in community.  - Pt to ambulate in clinic without ankle brace with continuous pattern and R heel strike and toe off in order to walk in her home to complete housework.    Progress per Plan of Care and Progress strengthening /stabilization /functional activity         Timed:         Manual Therapy:         mins  53769;     Therapeutic Exercise:    30     mins  05916;     Neuromuscular Annamarie:        mins  96368;    Therapeutic Activity:     10     mins  83483;     Gait Training:           mins  95296;     Ultrasound:          mins  58360;    Ionto                                   mins   46987  Self Care                            mins   49679    Un-Timed:  Electrical Stimulation:    15     mins  97180 ( );  Dry Needling          mins 03946/07660  Traction          mins 52539  Canalith Repos                   mins  81047  Low Eval          Mins  21729  Mod Eval          Mins  67372  High Eval                            Mins  24861  Re-Eval                               mins  20839    Timed Treatment:   40   mins   Total Treatment:     55   mins    Jazmín Marcus, PT, CLT, Cert DN  Physical Therapist  [place] Lic # [id number]

## 2023-02-13 NOTE — TELEPHONE ENCOUNTER
----- Message from Leon Griffin DO sent at 2/10/2023  8:38 AM EST -----  Can we please call and schedule patient for visit to discuss MRI results. Was also informed via [place] to call office for follow up appointment

## 2023-02-15 ENCOUNTER — TELEPHONE (OUTPATIENT)
Dept: PODIATRY | Facility: CLINIC | Age: 43
End: 2023-02-15
Payer: MEDICAID

## 2023-02-22 ENCOUNTER — OFFICE VISIT (OUTPATIENT)
Dept: PAIN MEDICINE | Facility: CLINIC | Age: 43
End: 2023-02-22
Payer: MEDICAID

## 2023-02-22 VITALS
DIASTOLIC BLOOD PRESSURE: 80 MMHG | SYSTOLIC BLOOD PRESSURE: 122 MMHG | OXYGEN SATURATION: 96 % | HEART RATE: 84 BPM | RESPIRATION RATE: 16 BRPM

## 2023-02-22 DIAGNOSIS — G90.521 COMPLEX REGIONAL PAIN SYNDROME TYPE 1 OF RIGHT LOWER EXTREMITY: Primary | ICD-10-CM

## 2023-02-22 DIAGNOSIS — G89.18 PAIN AT SURGICAL SITE: ICD-10-CM

## 2023-02-22 PROCEDURE — 99204 OFFICE O/P NEW MOD 45 MIN: CPT | Performed by: STUDENT IN AN ORGANIZED HEALTH CARE EDUCATION/TRAINING PROGRAM

## 2023-02-22 RX ORDER — CELECOXIB 200 MG/1
200 CAPSULE ORAL DAILY
Qty: 30 CAPSULE | Refills: 0 | Status: SHIPPED | OUTPATIENT
Start: 2023-02-22 | End: 2023-03-27

## 2023-02-22 RX ORDER — PREGABALIN 150 MG/1
150 CAPSULE ORAL 2 TIMES DAILY
Qty: 60 CAPSULE | Refills: 0 | Status: SHIPPED | OUTPATIENT
Start: 2023-02-22 | End: 2023-03-27

## 2023-02-23 ENCOUNTER — TREATMENT (OUTPATIENT)
Dept: PHYSICAL THERAPY | Facility: CLINIC | Age: 43
End: 2023-02-23
Payer: MEDICAID

## 2023-02-23 ENCOUNTER — OFFICE VISIT (OUTPATIENT)
Dept: SPORTS MEDICINE | Facility: CLINIC | Age: 43
End: 2023-02-23
Payer: MEDICAID

## 2023-02-23 VITALS — WEIGHT: 214 LBS | OXYGEN SATURATION: 98 % | BODY MASS INDEX: 36.73 KG/M2 | HEART RATE: 78 BPM

## 2023-02-23 DIAGNOSIS — M19.071 ARTHRITIS OF RIGHT FOOT: Primary | ICD-10-CM

## 2023-02-23 DIAGNOSIS — M17.11 ARTHRITIS OF RIGHT KNEE: ICD-10-CM

## 2023-02-23 DIAGNOSIS — R26.9 GAIT DISTURBANCE: ICD-10-CM

## 2023-02-23 DIAGNOSIS — M25.361 KNEE INSTABILITY, RIGHT: Primary | ICD-10-CM

## 2023-02-23 DIAGNOSIS — S93.324S DISLOCATION OF TARSOMETATARSAL JOINT OF RIGHT FOOT, SEQUELA: ICD-10-CM

## 2023-02-23 DIAGNOSIS — S83.521S RUPTURE OF POSTERIOR CRUCIATE LIGAMENT OF RIGHT KNEE, SEQUELA: ICD-10-CM

## 2023-02-23 DIAGNOSIS — G90.521 COMPLEX REGIONAL PAIN SYNDROME TYPE 1 OF RIGHT LOWER EXTREMITY: ICD-10-CM

## 2023-02-23 DIAGNOSIS — M79.671 RIGHT FOOT PAIN: ICD-10-CM

## 2023-02-23 PROCEDURE — 97530 THERAPEUTIC ACTIVITIES: CPT | Performed by: PHYSICAL THERAPIST

## 2023-02-23 PROCEDURE — 99204 OFFICE O/P NEW MOD 45 MIN: CPT | Performed by: STUDENT IN AN ORGANIZED HEALTH CARE EDUCATION/TRAINING PROGRAM

## 2023-02-23 PROCEDURE — 97014 ELECTRIC STIMULATION THERAPY: CPT | Performed by: PHYSICAL THERAPIST

## 2023-02-23 PROCEDURE — 97110 THERAPEUTIC EXERCISES: CPT | Performed by: PHYSICAL THERAPIST

## 2023-02-23 RX ORDER — GABAPENTIN 800 MG/1
TABLET ORAL
COMMUNITY
Start: 2023-02-22

## 2023-02-23 NOTE — PROGRESS NOTES
Physical Therapy Daily Treatment Note      Patient: Vidya Becerra   : 1980  Diagnosis/ICD-10 Code:  Arthritis of right foot [M19.071]   Problems Addressed this Visit    None  Visit Diagnoses     Arthritis of right foot    -  Primary    Right foot pain        Dislocation of tarsometatarsal joint of right foot, sequela        Gait disturbance        Complex regional pain syndrome type 1 of right lower extremity          Diagnoses       Codes Comments    Arthritis of right foot    -  Primary ICD-10-CM: M19.071  ICD-9-CM: 716.97     Right foot pain     ICD-10-CM: M79.671  ICD-9-CM: 729.5     Dislocation of tarsometatarsal joint of right foot, sequela     ICD-10-CM: S93.324S  ICD-9-CM: 905.6     Gait disturbance     ICD-10-CM: R26.9  ICD-9-CM: 781.2     Complex regional pain syndrome type 1 of right lower extremity     ICD-10-CM: G90.521  ICD-9-CM: 337.22         Referring practitioner: JESSICA Malloy DPM  Date of Initial Visit: Type: THERAPY  Noted: 2023  Today's Date: 2023    VISIT#: 6    Subjective : Pt reports she has had a lot of appts this wk. Saw Dr. Griffin for his knee and is being sent for custom knee brace to address PCL. States she will start having gel injections in R knee as long as they help. Had 1st appt with pain management and is having lumbar injection on 3/13/2023 to see if that will decrease the foot pain. Pt states pain management MD told her she has CRPS. Pt not sure why no other MD had said this before.    Objective : added prone quad stretch with strap to HEP. Pt demonstrated good understanding.  Assured pt that referring MD had CRPS on PT referral.  See Exercise, Manual, and Modality Logs for complete treatment.     Assessment/Plan : Gait improved this date with pt demonstrating R knee flexion during swing phase instead of keeping knee locked in extension throughout gait cycle. Pt needs continued R ankle and knee strengthening for improved wt bearing tolerance and improved gait  pattern and functional mobility. Plan to progress wt bearing activities as tolerated and progress to SLS.     Goals  Plan Goals: STG to be met in 3 wk:  - Increase R ankle DF AROM to 8 deg.  - PT to assess SLS in 3-4 visits.  - Pt to tolerate NuStep x10 min.  - Pt to be independent with HEP in 3 weeks.  LTG to be met in 12 wk:  - Improve FAAM score to 80% or better ability.  - Increase R ankle strength to 4+/5 in all directions in order to walk in her yard safely.  - Pt to negotiate stairs with reciprocal pattern and 1 handrail to negotiate in community.  - Pt to ambulate in clinic without ankle brace with continuous pattern and R heel strike and toe off in order to walk in her home to complete housework.    Progress per Plan of Care and Progress strengthening /stabilization /functional activity         Timed:         Manual Therapy:         mins  85525;     Therapeutic Exercise:    30     mins  54135;     Neuromuscular Annamarie:        mins  30934;    Therapeutic Activity:     10     mins  85076;     Gait Training:           mins  16782;     Ultrasound:          mins  66997;    Ionto                                   mins   80351  Self Care                            mins   87982    Un-Timed:  Electrical Stimulation:    15     mins  24336 ( );  Dry Needling          mins 70381/78289  Traction          mins 42635  Canalith Repos                   mins  95381  Low Eval          Mins  40085  Mod Eval          Mins  51578  High Eval                            Mins  59021  Re-Eval                               mins  14025    Timed Treatment:   40   mins   Total Treatment:     55   mins    Jazmín Marcus, PT, CLT, Cert DN  Physical Therapist  [place] Lic # [id number]

## 2023-02-23 NOTE — PROGRESS NOTES
NEW VISIT    Patient: Vidya MENDOZA Still  ?  YOB: 1980    MRN: [id number]  ?  Chief Complaint   Patient presents with   • Right Knee - Follow-up      ?  HPI: Patient is returning today for follow-up of her right knee MRI.  No new injuries since her last visit.  States she still has feelings of instability, and have acute medial joint line pain.  She has been working with physical therapy primarily for her ankle, but they have also been giving her exercises for both her knee and hip.  She is also established with pain management for her chronic multiple joint pain.  He has previously had viscosupplementation injections the last of which was a year and a half ago for her arthritic knee pain which gave her 6 months of relief.  Has recently failed oral medications and intra-articular corticosteroid injections.      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   • Chronic pain disorder    • CTS (carpal tunnel syndrome) 08/2022   • Extremity pain    • Fracture, femur (HCC) 01/1996   • Fracture, foot 01/1996   • 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   • Hypertension    • Joint pain    • Knee swelling 06/2017   • Low back pain    • Migraine      Past Surgical History:   Procedure Laterality Date   • FOOT SURGERY Right 2021   • FRACTURE SURGERY     • HAND SURGERY  01/1996   • HIP SURGERY  2017    sherri removed    • HYSTERECTOMY  2015   • KNEE SURGERY Right 2017    PCO replaced    • ORTHOPEDIC SURGERY     • TOE SURGERY  2017    4th metasaral surgery      Social History     Occupational History   • Not on file   Tobacco Use   • Smoking status: Every Day     Packs/day: 1.50     Years: 15.00     Pack years: 22.50     Types: Cigarettes   • Smokeless tobacco: Never   Vaping Use   • Vaping Use: Never used   Substance and Sexual Activity   • Alcohol use: Yes     Alcohol/week: 3.0 standard drinks     Types: 3 Cans of beer per week      Comment: Only on social occasions   • Drug use: Never   • Sexual activity: Yes     Partners: Male     Birth control/protection: Hysterectomy      Social History     Social History Narrative   • Not on file     Family History   Problem Relation Age of Onset   • Hypertension Father    • Heart disease Father         50's   • Hypertension Maternal Grandmother    • Heart disease Maternal Grandmother    • Diabetes Maternal Grandmother    • Cancer Maternal Grandfather    • Cancer Maternal Uncle        Review of Systems  Constitutional: Negative.  Negative for fever.   Musculoskeletal: Positive for joint pain  Skin: Negative.  Negative for rash and wound.    Neurological: Negative for numbness.       Body mass index is 36.73 kg/m².  Vitals:    02/23/23 1023   Pulse: 78   SpO2: 98%   Weight: 97.1 kg (214 lb)        Physical Exam  Constitutional: Patient is oriented to person, place, and time. Appears well-developed and well-nourished.   Head: Normocephalic and atraumatic.   Pulmonary/Chest: Effort normal.   Musculoskeletal:   See detailed exam below   Neurological: Alert and oriented to person, place, and time. No sensory deficit. Coordination normal.   Skin: Skin is warm and dry. Capillary refill takes less than 2 seconds. No rash noted. No erythema.     The right knee is without obvious signs of acute bony deformity, quadriceps atrophy, swelling, erythema, ecchymosis or joint effusion. The patella is without tenderness. Apprehension is negative with medial and lateral glide. Patella crepitus is negative. Patella grind is negative.  Significant tenderness over the medial joint line.  No tenderness over the lateral joint line.  Positive sag sign.  Positive posterior drawer with grade 1-2 laxity and pain.   At 90 degrees she has 5 to 10 degrees more external rotation compared with contralateral side.  Soft tissue including the distal hamstring tendons, pes anserine, quad tendon, patellar tendon, proximal gastroc tendon,  distal IT band, and Gerdy's tubercle are nontender.  Flexion is full with discomfort and feeling of instability with greater than 90 degrees of knee flexion.  Full extension with feeling of instability at full extension and hyperextension.  Knee and hip strength is 5/5 and symmetrical. Varus & valgus stress, Lachman's, anterior drawer and Suzette's negative. The opposite knee is nontender and stable. Gait is uncomfortable and mildly antalgic with medial off  knee brace.    Diagnostics:     MRI Knee Right Without Contrast    Result Date: 2/9/2023  1.PCL graft is intact and has normal appearance. 2.There is moderate chondromalacia patellofemoral compartment. There is mild chondromalacia of the lateral compartment and there is diffuse cartilage narrowing in the medial compartment. 3.Mild edema quadriceps fat pad with mild adjacent osseous marrow edema. Question fat impingement. Electronically Signed: Jade Franco  2/9/2023 12:15 PM EST  Workstation ID: [id number]    Personal additional interpretation: Noted edema surrounding posterior cruciate ligament with ligament fully intact.  No noted swelling in posterior lateral corner.      Assessment:  Diagnoses and all orders for this visit:    1. Knee instability, right (Primary)    2. Rupture of posterior cruciate ligament of right knee, sequela    3. Arthritis of right knee        Plan      Patient is returning today for follow-up of her right knee MRI.  I personally reviewed the MRI report and images with the patient today which was remarkable for an intact PCL graft although with surrounding edema as well as mild arthritic change in the patellofemoral and lateral compartment and moderate to severe arthritic change in the medial compartment.  Continues to have instability episodes of her right knee with noted PCL laxity on exam.  Also has medial joint line tenderness which is likely related to her osteoarthritis.  She has been wearing a medial  brace which has  mildly helped her osteoarthritic medial pain, but is not helping her anterior to posterior instability feelings.  We discussed different treatment options today including a custom PCL brace, corticosteroid versus viscosupplementation injection therapy, or referral for discussion of knee replacement.  I feel given her continued instability episodes this is likely due to to functional laxity of her PCL and a custom PCL knee brace will be required long with physical therapy.  Due to the patient's disproportionate size of thigh and calf a custom PCL knee brace will be required.  For her arthritic pain we will proceed with a repeat viscosupplementation injection.  She had 6 to 7 months of relief from her previous injection.  We will work on approval for her injection and have her return to the office once it is obtained.  She will also continue with physical therapy.  She is already established for her right foot pain but is working on her entire right lower extremity with PT.  Can place new order as needed.  Follow-up for viscosupplementation injection or sooner as needed.    Date of encounter: 01/17/2023   Leon Griffin DO    Electronically signed by Leon Griffin DO, 01/17/23, 11:36 AM EST.    Disclaimer: Please note that areas of this note were completed with computer voice recognition software.  Quite often unanticipated grammatical, syntax, homophones, and other interpretive errors are inadvertently transcribed by the computer software. Please excuse any errors that have escaped final proofreading.
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
## 2023-02-27 ENCOUNTER — TREATMENT (OUTPATIENT)
Dept: PHYSICAL THERAPY | Facility: CLINIC | Age: 43
End: 2023-02-27
Payer: MEDICAID

## 2023-02-27 DIAGNOSIS — G90.521 COMPLEX REGIONAL PAIN SYNDROME TYPE 1 OF RIGHT LOWER EXTREMITY: ICD-10-CM

## 2023-02-27 DIAGNOSIS — R26.9 GAIT DISTURBANCE: ICD-10-CM

## 2023-02-27 DIAGNOSIS — M79.671 RIGHT FOOT PAIN: ICD-10-CM

## 2023-02-27 DIAGNOSIS — S93.324S DISLOCATION OF TARSOMETATARSAL JOINT OF RIGHT FOOT, SEQUELA: ICD-10-CM

## 2023-02-27 DIAGNOSIS — M19.071 ARTHRITIS OF RIGHT FOOT: Primary | ICD-10-CM

## 2023-02-27 PROCEDURE — 97530 THERAPEUTIC ACTIVITIES: CPT | Performed by: PHYSICAL THERAPIST

## 2023-02-27 PROCEDURE — 97014 ELECTRIC STIMULATION THERAPY: CPT | Performed by: PHYSICAL THERAPIST

## 2023-02-27 PROCEDURE — 97110 THERAPEUTIC EXERCISES: CPT | Performed by: PHYSICAL THERAPIST

## 2023-02-27 NOTE — PROGRESS NOTES
Physical Therapy Daily Treatment Note      Patient: Vidya Becerra   : 1980  Diagnosis/ICD-10 Code:  Arthritis of right foot [M19.071]   Problems Addressed this Visit    None  Visit Diagnoses     Arthritis of right foot    -  Primary    Right foot pain        Dislocation of tarsometatarsal joint of right foot, sequela        Gait disturbance        Complex regional pain syndrome type 1 of right lower extremity          Diagnoses       Codes Comments    Arthritis of right foot    -  Primary ICD-10-CM: M19.071  ICD-9-CM: 716.97     Right foot pain     ICD-10-CM: M79.671  ICD-9-CM: 729.5     Dislocation of tarsometatarsal joint of right foot, sequela     ICD-10-CM: S93.324S  ICD-9-CM: 905.6     Gait disturbance     ICD-10-CM: R26.9  ICD-9-CM: 781.2     Complex regional pain syndrome type 1 of right lower extremity     ICD-10-CM: G90.521  ICD-9-CM: 337.22         Referring practitioner: JESSICA Malloy DPM  Date of Initial Visit: Type: THERAPY  Noted: 2023  Today's Date: 2023    VISIT#: 7    Subjective : Pt reports she is being for new knee brace on Wed. Had some foot and B quad soreness after last visit. Has been paying attention to not hyperextending R knee when standing and walking. States knee is feeling better but still feels like a boulder is sitting on the top of her foot. Doing HEP.    Objective :     See Exercise, Manual, and Modality Logs for complete treatment.     Assessment/Plan : Improved ability noted in modified R SLS. Pt showing improved R knee control and awareness. Improved tolerance to R quad stretch. Pt needs continued R ankle and knee strengthening for improved wt bearing tolerance and improved gait pattern and functional mobility. Plan to progress wt bearing activities as tolerated and progress to SLS.     Goals  Plan Goals: STG to be met in 3 wk:  - Increase R ankle DF AROM to 8 deg. - Progressing  - PT to assess SLS in 3-4 visits. - Not met  - Pt to tolerate NuStep x10 min. -  Progressing  - Pt to be independent with HEP in 3 weeks.  LTG to be met in 12 wk:  - Improve FAAM score to 80% or better ability.  - Increase R ankle strength to 4+/5 in all directions in order to walk in her yard safely.  - Pt to negotiate stairs with reciprocal pattern and 1 handrail to negotiate in community.  - Pt to ambulate in clinic without ankle brace with continuous pattern and R heel strike and toe off in order to walk in her home to complete housework.    Progress per Plan of Care and Progress strengthening /stabilization /functional activity         Timed:         Manual Therapy:         mins  13201;     Therapeutic Exercise:    25     mins  88731;     Neuromuscular Annamarie:   5     mins  59514;    Therapeutic Activity:     10     mins  66698;     Gait Training:           mins  77660;     Ultrasound:          mins  07563;    Ionto                                   mins   13560  Self Care                            mins   66649    Un-Timed:  Electrical Stimulation:    15     mins  45454 ( );  Dry Needling          mins 71958/10038  Traction          mins 25544  Canalith Repos                   mins  67461  Low Eval          Mins  82919  Mod Eval          Mins  49567  High Eval                            Mins  97289  Re-Eval                               mins  43008    Timed Treatment:   40   mins   Total Treatment:     55   mins    Jazmín Marcus, PT, CLT, Cert DN  Physical Therapist  [place] Lic # [id number]

## 2023-03-02 ENCOUNTER — TREATMENT (OUTPATIENT)
Dept: PHYSICAL THERAPY | Facility: CLINIC | Age: 43
End: 2023-03-02
Payer: MEDICAID

## 2023-03-02 DIAGNOSIS — M79.671 RIGHT FOOT PAIN: ICD-10-CM

## 2023-03-02 DIAGNOSIS — G90.521 COMPLEX REGIONAL PAIN SYNDROME TYPE 1 OF RIGHT LOWER EXTREMITY: ICD-10-CM

## 2023-03-02 DIAGNOSIS — S93.324S DISLOCATION OF TARSOMETATARSAL JOINT OF RIGHT FOOT, SEQUELA: ICD-10-CM

## 2023-03-02 DIAGNOSIS — M19.071 ARTHRITIS OF RIGHT FOOT: Primary | ICD-10-CM

## 2023-03-02 DIAGNOSIS — R26.9 GAIT DISTURBANCE: ICD-10-CM

## 2023-03-02 PROCEDURE — 97140 MANUAL THERAPY 1/> REGIONS: CPT | Performed by: PHYSICAL THERAPIST

## 2023-03-02 PROCEDURE — 97110 THERAPEUTIC EXERCISES: CPT | Performed by: PHYSICAL THERAPIST

## 2023-03-02 PROCEDURE — 97530 THERAPEUTIC ACTIVITIES: CPT | Performed by: PHYSICAL THERAPIST

## 2023-03-02 NOTE — PROGRESS NOTES
Physical Therapy Daily Treatment Note      Patient: Vidya Becerra   : 1980  Diagnosis/ICD-10 Code:  Arthritis of right foot [M19.071]  Referring practitioner: JESSICA Malloy DPM  Date of Initial Visit: Type: THERAPY  Noted: 2023  Today's Date: 3/2/2023  Patient seen for 8 sessions         Vidya Becerra reports: she has been doing better overall being conscious of not hyperextending her knee and that is going well. Pt. States her foot is about the same but she feels she is improving with strength and balance. Pt. Reports she overdid it yesterday and both of her knees are pretty sore today with more significant soreness In R knee.    Objective   See Exercise, Manual, and Modality Logs for complete treatment.     Assessment/Plan   Pt. Tolerates treatment well this visit and continues to perform strengthening activities with good form and shows good understanding of avoiding hyper extension in knee at this time. Pt. Will continue to benefit from weightbearing activities to increase equal tolerance.    Goals  Plan Goals: STG to be met in 3 wk:  - Increase R ankle DF AROM to 8 deg. - Progressing  - PT to assess SLS in 3-4 visits. - Not met  - Pt to tolerate NuStep x10 min. - Progressing  - Pt to be independent with HEP in 3 weeks.  LTG to be met in 12 wk:  - Improve FAAM score to 80% or better ability.  - Increase R ankle strength to 4+/5 in all directions in order to walk in her yard safely.  - Pt to negotiate stairs with reciprocal pattern and 1 handrail to negotiate in community.  - Pt to ambulate in clinic without ankle brace with continuous pattern and R heel strike and toe off in order to walk in her home to complete housework.    Progress strengthening /stabilization /functional activity           Timed:         Manual Therapy:    10     mins  17772;     Therapeutic Exercise:    20     mins  65790;     Therapeutic Activity:     10     mins  19072;       Timed Treatment:   40   mins   Total  Treatment:     40   mins    Xiomara Osborne PTA  Physical Therapist Assistant License #[id number]

## 2023-03-07 ENCOUNTER — TREATMENT (OUTPATIENT)
Dept: PHYSICAL THERAPY | Facility: CLINIC | Age: 43
End: 2023-03-07
Payer: MEDICAID

## 2023-03-07 DIAGNOSIS — M19.071 ARTHRITIS OF RIGHT FOOT: Primary | ICD-10-CM

## 2023-03-07 DIAGNOSIS — S93.324S DISLOCATION OF TARSOMETATARSAL JOINT OF RIGHT FOOT, SEQUELA: ICD-10-CM

## 2023-03-07 DIAGNOSIS — R26.9 GAIT DISTURBANCE: ICD-10-CM

## 2023-03-07 DIAGNOSIS — G90.521 COMPLEX REGIONAL PAIN SYNDROME TYPE 1 OF RIGHT LOWER EXTREMITY: ICD-10-CM

## 2023-03-07 DIAGNOSIS — M79.671 RIGHT FOOT PAIN: ICD-10-CM

## 2023-03-07 PROCEDURE — 97110 THERAPEUTIC EXERCISES: CPT | Performed by: PHYSICAL THERAPIST

## 2023-03-07 PROCEDURE — 97014 ELECTRIC STIMULATION THERAPY: CPT | Performed by: PHYSICAL THERAPIST

## 2023-03-07 PROCEDURE — 97530 THERAPEUTIC ACTIVITIES: CPT | Performed by: PHYSICAL THERAPIST

## 2023-03-07 PROCEDURE — 97112 NEUROMUSCULAR REEDUCATION: CPT | Performed by: PHYSICAL THERAPIST

## 2023-03-07 NOTE — PROGRESS NOTES
Physical Therapy Daily Treatment Note      Patient: Vidya Becerra   : 1980  Diagnosis/ICD-10 Code:  Arthritis of right foot [M19.071]  Referring practitioner: JESSICA Malloy DPM  Date of Initial Visit: Type: THERAPY  Noted: 2023  Today's Date: 3/7/2023  Patient seen for 9 sessions         Vidya Becerra reports: she continues to have foot pain but not as bad most days stating it has improved some recently however did have a random day yesterday with no apparent reasoning for increased swelling and discomfort all day limiting her ability to ambulate and do upright activities.    Objective   See Exercise, Manual, and Modality Logs for complete treatment.     Assessment/Plan   Pt. responds very well to exercise date however soreness is increased throughout session. Pt. continues to show improving stability and strength despite discomfort.    Goals  Plan Goals: STG to be met in 3 wk:  - Increase R ankle DF AROM to 8 deg. - Progressing  - PT to assess SLS in 3-4 visits. - Not met  - Pt to tolerate NuStep x10 min. - Progressing  - Pt to be independent with HEP in 3 weeks.  LTG to be met in 12 wk:  - Improve FAAM score to 80% or better ability.  - Increase R ankle strength to 4+/5 in all directions in order to walk in her yard safely.  - Pt to negotiate stairs with reciprocal pattern and 1 handrail to negotiate in community.  - Pt to ambulate in clinic without ankle brace with continuous pattern and R heel strike and toe off in order to walk in her home to complete housework.    Progress strengthening /stabilization /functional activity           Timed:           Therapeutic Exercise:    15     mins  09623;     Neuromuscular Annamarie:    15    mins  29091;    Therapeutic Activity:     10     mins  18291;       Un-Timed:  Electrical Stimulation:    15     mins  91858 ( );      Timed Treatment:   40   mins   Total Treatment:     40   mins    Xiomara Osborne PTA  Physical Therapist Assistant License  #[id number]

## 2023-03-10 ENCOUNTER — OFFICE VISIT (OUTPATIENT)
Dept: SPORTS MEDICINE | Facility: CLINIC | Age: 43
End: 2023-03-10
Payer: MEDICAID

## 2023-03-10 VITALS — HEART RATE: 76 BPM | OXYGEN SATURATION: 98 % | BODY MASS INDEX: 36.73 KG/M2 | WEIGHT: 214 LBS

## 2023-03-10 DIAGNOSIS — S83.521S RUPTURE OF POSTERIOR CRUCIATE LIGAMENT OF RIGHT KNEE, SEQUELA: ICD-10-CM

## 2023-03-10 DIAGNOSIS — M25.561 CHRONIC PAIN OF RIGHT KNEE: ICD-10-CM

## 2023-03-10 DIAGNOSIS — M17.11 ARTHRITIS OF RIGHT KNEE: Primary | ICD-10-CM

## 2023-03-10 DIAGNOSIS — G89.29 CHRONIC PAIN OF RIGHT KNEE: ICD-10-CM

## 2023-03-10 PROCEDURE — 1159F MED LIST DOCD IN RCRD: CPT | Performed by: STUDENT IN AN ORGANIZED HEALTH CARE EDUCATION/TRAINING PROGRAM

## 2023-03-10 PROCEDURE — 1160F RVW MEDS BY RX/DR IN RCRD: CPT | Performed by: STUDENT IN AN ORGANIZED HEALTH CARE EDUCATION/TRAINING PROGRAM

## 2023-03-10 PROCEDURE — 20610 DRAIN/INJ JOINT/BURSA W/O US: CPT | Performed by: STUDENT IN AN ORGANIZED HEALTH CARE EDUCATION/TRAINING PROGRAM

## 2023-03-10 RX ORDER — LIDOCAINE HYDROCHLORIDE 10 MG/ML
2 INJECTION, SOLUTION EPIDURAL; INFILTRATION; INTRACAUDAL; PERINEURAL
Status: DISCONTINUED | OUTPATIENT
Start: 2023-03-10 | End: 2023-03-10 | Stop reason: HOSPADM

## 2023-03-10 RX ORDER — MELOXICAM 15 MG/1
15 TABLET ORAL DAILY
COMMUNITY

## 2023-03-10 RX ADMIN — LIDOCAINE HYDROCHLORIDE 2 ML: 10 INJECTION, SOLUTION EPIDURAL; INFILTRATION; INTRACAUDAL; PERINEURAL at 10:33

## 2023-03-10 NOTE — PROGRESS NOTES
Procedure   - Large Joint Arthrocentesis: R knee on 3/10/2023 10:33 AM  Indications: pain  Details: 20 G needle, anterolateral approach  Medications: 88 mg Hyaluronan 88 MG/4ML; 2 mL lidocaine PF 1% 1 %  Outcome: tolerated well, no immediate complications  Procedure, treatment alternatives, risks and benefits explained, specific risks discussed. Consent was given by the patient. Immediately prior to procedure a time out was called to verify the correct patient, procedure, equipment, support staff and site/side marked as required. Patient was prepped and draped in the usual sterile fashion.

## 2023-03-10 NOTE — PROGRESS NOTES
FOLLOW UP VISIT    Patient: Vidya MENDOZA Still  ?  YOB: 1980    MRN: [id number]  ?  Chief Complaint   Patient presents with   • Right Knee - Follow-up      ?  HPI: Patient returning today for follow-up of right knee pain.  She is in the process of getting custom PCL brace fit to help with laxity and preventing hyperextension.  Is also continuing with physical therapy for her entire right lower extremity.  States she is currently scheduled for multiple weeks and does not need any new referral.  Denies any new injury.  Presenting today for Monovisc injection for her knee arthritis.      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   • Chronic pain disorder    • CTS (carpal tunnel syndrome) 08/2022   • Extremity pain    • Fracture, femur (HCC) 01/1996   • Fracture, foot 01/1996   • 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   • Hypertension    • Joint pain    • Knee swelling 06/2017   • Low back pain    • Migraine      Past Surgical History:   Procedure Laterality Date   • FOOT SURGERY Right 2021   • FRACTURE SURGERY     • HAND SURGERY  01/1996   • HIP SURGERY  2017    sherri removed    • HYSTERECTOMY  2015   • KNEE SURGERY Right 2017    PCO replaced    • ORTHOPEDIC SURGERY     • TOE SURGERY  2017    4th metasaral surgery      Social History     Occupational History   • Not on file   Tobacco Use   • Smoking status: Every Day     Packs/day: 1.50     Years: 15.00     Pack years: 22.50     Types: Cigarettes   • Smokeless tobacco: Never   Vaping Use   • Vaping Use: Never used   Substance and Sexual Activity   • Alcohol use: Yes     Alcohol/week: 3.0 standard drinks     Types: 3 Cans of beer per week     Comment: Only on social occasions   • Drug use: Never   • Sexual activity: Yes     Partners: Male     Birth control/protection: Hysterectomy      Social History     Social History Narrative   • Not on file     Family History    Problem Relation Age of Onset   • Hypertension Father    • Heart disease Father         50's   • Hypertension Maternal Grandmother    • Heart disease Maternal Grandmother    • Diabetes Maternal Grandmother    • Cancer Maternal Grandfather    • Cancer Maternal Uncle        Review of Systems  Constitutional: Negative.  Negative for fever.   Musculoskeletal: Positive for joint pain  Skin: Negative.  Negative for rash and wound.    Neurological: Negative for numbness.       Body mass index is 36.73 kg/m².  Vitals:    03/10/23 1021   Pulse: 76   SpO2: 98%   Weight: 97.1 kg (214 lb)        Physical Exam  Constitutional: Patient is oriented to person, place, and time. Appears well-developed and well-nourished.   Head: Normocephalic and atraumatic.   Pulmonary/Chest: Effort normal.   Musculoskeletal:   See detailed exam below   Neurological: Alert and oriented to person, place, and time. No sensory deficit. Coordination normal.   Skin: Skin is warm and dry. Capillary refill takes less than 2 seconds. No rash noted. No erythema.     The right knee is without obvious signs of acute bony deformity, quadriceps atrophy, swelling, erythema, ecchymosis or joint effusion. The patella is without tenderness. Apprehension is negative with medial and lateral glide. Patella crepitus is negative. Patella grind is negative.  Significant tenderness over the medial joint line.  No tenderness over the lateral joint line.  Positive sag sign.  Positive posterior drawer with grade 1-2 laxity and pain.   At 90 degrees she has 5 to 10 degrees more external rotation compared with contralateral side.  Soft tissue including the distal hamstring tendons, pes anserine, quad tendon, patellar tendon, proximal gastroc tendon, distal IT band, and Gerdy's tubercle are nontender.  Flexion is full with discomfort and feeling of instability with greater than 90 degrees of knee flexion.  Full extension with feeling of instability at full extension and  hyperextension.  Knee and hip strength is 5/5 and symmetrical. Varus & valgus stress, Lachman's, anterior drawer and Suzette's negative. The opposite knee is nontender and stable. Gait is uncomfortable and mildly antalgic with medial off  knee brace.    Exam reviewed and no interval change since last office visit.    Diagnostics:     MRI Knee Right Without Contrast    Result Date: 2/9/2023  1.PCL graft is intact and has normal appearance. 2.There is moderate chondromalacia patellofemoral compartment. There is mild chondromalacia of the lateral compartment and there is diffuse cartilage narrowing in the medial compartment. 3.Mild edema quadriceps fat pad with mild adjacent osseous marrow edema. Question fat impingement. Electronically Signed: Jade Salvador  2/9/2023 12:15 PM EST  Workstation ID: [id number]    Personal additional interpretation: Noted edema surrounding posterior cruciate ligament with ligament fully intact.  No noted swelling in posterior lateral corner.      Assessment:  Diagnoses and all orders for this visit:    1. Arthritis of right knee (Primary)  -     - Large Joint Arthrocentesis: R knee    2. Chronic pain of right knee  -     - Large Joint Arthrocentesis: R knee    3. Rupture of posterior cruciate ligament of right knee, sequela        Plan      Patient received Monovisc injection for her right knee osteoarthritis as noted above without complication.  We discussed this will typically take 2 to 3 months until maximal relief is felt, and that the minimal time interval between injections is 6 months.  She will continue to proceed with custom PCL brace and physical therapy for knee instability as discussed in prior note.  We will follow-up as needed for repeat injection after 6 months, or sooner as needed.      Date of encounter: 03/10/2023   Leon Griffin DO    Disclaimer: Please note that areas of this note were completed with computer voice recognition software.  Quite often unanticipated  grammatical, syntax, homophones, and other interpretive errors are inadvertently transcribed by the computer software. Please excuse any errors that have escaped final proofreading.

## 2023-03-14 ENCOUNTER — TREATMENT (OUTPATIENT)
Dept: PHYSICAL THERAPY | Facility: CLINIC | Age: 43
End: 2023-03-14
Payer: MEDICAID

## 2023-03-14 DIAGNOSIS — M19.071 ARTHRITIS OF RIGHT FOOT: Primary | ICD-10-CM

## 2023-03-14 DIAGNOSIS — M79.671 RIGHT FOOT PAIN: ICD-10-CM

## 2023-03-14 DIAGNOSIS — S93.324S DISLOCATION OF TARSOMETATARSAL JOINT OF RIGHT FOOT, SEQUELA: ICD-10-CM

## 2023-03-14 DIAGNOSIS — R26.9 GAIT DISTURBANCE: ICD-10-CM

## 2023-03-14 DIAGNOSIS — G90.521 COMPLEX REGIONAL PAIN SYNDROME TYPE 1 OF RIGHT LOWER EXTREMITY: ICD-10-CM

## 2023-03-14 PROCEDURE — 97110 THERAPEUTIC EXERCISES: CPT | Performed by: PHYSICAL THERAPIST

## 2023-03-14 PROCEDURE — 97140 MANUAL THERAPY 1/> REGIONS: CPT | Performed by: PHYSICAL THERAPIST

## 2023-03-14 PROCEDURE — 97112 NEUROMUSCULAR REEDUCATION: CPT | Performed by: PHYSICAL THERAPIST

## 2023-03-14 NOTE — PROGRESS NOTES
Physical Therapy Progress Note/Reassessment  [address] [place] [address] [address]   [place] [address]    Patient: Vidya Becerra   : 1980  Diagnosis/ICD-10 Code:  Arthritis of right foot [M19.071]  Referring practitioner: JESSICA Malloy DPM  Date of Initial Evaluation:  Type: THERAPY  Noted: 2023  Patient seen for 10 sessions      Subjective:   Visit Diagnoses:    ICD-10-CM ICD-9-CM   1. Arthritis of right foot  M19.071 716.97   2. Right foot pain  M79.671 729.5   3. Dislocation of tarsometatarsal joint of right foot, sequela  S93.324S 905.6   4. Gait disturbance  R26.9 781.2   5. Complex regional pain syndrome type 1 of right lower extremity  G90.521 337.22         Vidya Becerra reports Pt. Starts her day at 2/10 pain in foot and ankle w/ walking increasing to as much as a 6-8/10 at times. Pt. States sitting down stays at a 2/10. Pt. States there is still some form of discomfort at all times currently.  Subjective Questionnaire: FAAM 5184 = 60%  Clinical Progress: improved  Home Program Compliance: Yes  Treatment has included: therapeutic exercise, neuromuscular re-education, manual therapy, therapeutic activity and electrical stimulation    Objective     AROM  Right Ankle/Foot   Dorsiflexion (ke): 6 degrees   Plantar flexion: 50 degrees   Inversion: 35 degrees   Eversion: 36 degrees     MMT  Right Ankle/Foot   Dorsiflexion: 5/5  Plantar flexion: 5/5  Inversion: 4/5  Eversion: 4/5     Assessment/Plan  Pt. Tolerates treatment well this date and with update of goals and measures is showing increase in both strength and mobility. Pt. partially achieves goals with strength and motion increases at this time. Pt. Strength gains can be referenced in objective measures and are improved as compared to evaluation test and measures. Pt. is benefiting well from exercise and stability training and will continue to benefit from skilled increase of these activities.    Goals  Plan Goals: STG to be met in 3 wk:  - Increase R  ankle DF AROM to 8 deg. - Progressing  - PT to assess SLS in 3-4 visits. - MET (22 seconds 3/14/2023)  - Pt to tolerate NuStep x10 min. - Progressing  - Pt to be independent with HEP in 3 weeks. MET  LTG to be met in 12 wk:  - Improve FAAM score to 80% or better ability. Progressing (current 60%)  - Increase R ankle strength to 4+/5 in all directions in order to walk in her yard safely. Progressing  - Pt to negotiate stairs with reciprocal pattern and 1 handrail to negotiate in community. Progressing (contingent pain level)  - Pt to ambulate in clinic without ankle brace with continuous pattern and R heel strike and toe off in order to walk in her home to complete housework. MET     Recommendations: Continue as planned  Timeframe: 1 month  Prognosis to achieve goals: good      Timed:         Manual Therapy:    15     mins  20252;     Therapeutic Exercise:    15     mins  08396;     Neuromuscular Annamarie:    15    mins  35556;       Timed Treatment:   45   mins   Total Treatment:     45   mins    PT Signature: Xiomara Osborne PTA  [place] License: #[id number]

## 2023-03-16 ENCOUNTER — TREATMENT (OUTPATIENT)
Dept: PHYSICAL THERAPY | Facility: CLINIC | Age: 43
End: 2023-03-16
Payer: MEDICAID

## 2023-03-16 DIAGNOSIS — G90.521 COMPLEX REGIONAL PAIN SYNDROME TYPE 1 OF RIGHT LOWER EXTREMITY: ICD-10-CM

## 2023-03-16 DIAGNOSIS — M79.671 RIGHT FOOT PAIN: ICD-10-CM

## 2023-03-16 DIAGNOSIS — R26.9 GAIT DISTURBANCE: ICD-10-CM

## 2023-03-16 DIAGNOSIS — S93.324S DISLOCATION OF TARSOMETATARSAL JOINT OF RIGHT FOOT, SEQUELA: ICD-10-CM

## 2023-03-16 DIAGNOSIS — M19.071 ARTHRITIS OF RIGHT FOOT: Primary | ICD-10-CM

## 2023-03-16 PROCEDURE — 97110 THERAPEUTIC EXERCISES: CPT | Performed by: PHYSICAL THERAPIST

## 2023-03-16 PROCEDURE — 97530 THERAPEUTIC ACTIVITIES: CPT | Performed by: PHYSICAL THERAPIST

## 2023-03-16 PROCEDURE — 97112 NEUROMUSCULAR REEDUCATION: CPT | Performed by: PHYSICAL THERAPIST

## 2023-03-16 NOTE — PROGRESS NOTES
Physical Therapy Daily Treatment Note      Patient: Vidya Becerra   : 1980  Diagnosis/ICD-10 Code:  Arthritis of right foot [M19.071]  Referring practitioner: JESSICA Malloy DPM  Date of Initial Visit: Type: THERAPY  Noted: 2023  Today's Date: 3/16/2023  Patient seen for 11 sessions         Vidya Becerra reports: she is doing good today still having the heavy feeling in her foot but states she is feeling some stronger but her knee continues to bother her without a brace that supports her correctly.    Objective   See Exercise, Manual, and Modality Logs for complete treatment.     Assessment/Plan   Pt. Tolerates treatment well this visit and completes exercise with improving proficiency this date increasing multiple exercises resistance and good form Is still noted.     Goals  Plan Goals: STG to be met in 3 wk:  - Increase R ankle DF AROM to 8 deg. - Progressing  - PT to assess SLS in 3-4 visits. - MET (22 seconds 3/14/2023)  - Pt to tolerate NuStep x10 min. - Progressing  - Pt to be independent with HEP in 3 weeks. MET  LTG to be met in 12 wk:  - Improve FAAM score to 80% or better ability. Progressing (current 60%)  - Increase R ankle strength to 4+/5 in all directions in order to walk in her yard safely. Progressing  - Pt to negotiate stairs with reciprocal pattern and 1 handrail to negotiate in community. Progressing (contingent pain level)  - Pt to ambulate in clinic without ankle brace with continuous pattern and R heel strike and toe off in order to walk in her home to complete housework. MET    Progress strengthening /stabilization /functional activity           Timed:           Therapeutic Exercise:    15     mins  17191;     Neuromuscular Annamarie:    15    mins  94214;    Therapeutic Activity:     15     mins  09773;         Timed Treatment:   45   mins   Total Treatment:     45   mins    Xiomara Osborne PTA  Physical Therapist Assistant License #[id number]

## 2023-03-21 ENCOUNTER — TREATMENT (OUTPATIENT)
Dept: PHYSICAL THERAPY | Facility: CLINIC | Age: 43
End: 2023-03-21
Payer: MEDICAID

## 2023-03-21 DIAGNOSIS — S93.324S DISLOCATION OF TARSOMETATARSAL JOINT OF RIGHT FOOT, SEQUELA: ICD-10-CM

## 2023-03-21 DIAGNOSIS — M79.671 RIGHT FOOT PAIN: ICD-10-CM

## 2023-03-21 DIAGNOSIS — R26.9 GAIT DISTURBANCE: ICD-10-CM

## 2023-03-21 DIAGNOSIS — M19.071 ARTHRITIS OF RIGHT FOOT: Primary | ICD-10-CM

## 2023-03-21 DIAGNOSIS — G90.521 COMPLEX REGIONAL PAIN SYNDROME TYPE 1 OF RIGHT LOWER EXTREMITY: ICD-10-CM

## 2023-03-21 PROCEDURE — 97530 THERAPEUTIC ACTIVITIES: CPT | Performed by: PHYSICAL THERAPIST

## 2023-03-21 PROCEDURE — 97110 THERAPEUTIC EXERCISES: CPT | Performed by: PHYSICAL THERAPIST

## 2023-03-21 NOTE — PROGRESS NOTES
Physical Therapy Progress Note/Reassessment  [address] [place] [address] , [address], [place] [address]    Patient: Vidya Becerra   : 1980  Diagnosis/ICD-10 Code:  Arthritis of right foot [M19.071]  Referring practitioner: JESSICA Malloy DPM  Date of Initial Evaluation:  Type: THERAPY  Noted: 2023  Patient seen for 12 sessions      Subjective:   Visit Diagnoses:    ICD-10-CM ICD-9-CM   1. Arthritis of right foot  M19.071 716.97   2. Right foot pain  M79.671 729.5   3. Dislocation of tarsometatarsal joint of right foot, sequela  S93.324S 905.6   4. Gait disturbance  R26.9 781.2   5. Complex regional pain syndrome type 1 of right lower extremity  G90.521 337.22         Vidya Becerra reports she got her new R knee brace today. Can tell it is holding her knee where it needs to be and is having some soreness. Got gel injection recently and states she feels like it is helping too. Pt reports there she is feeling stronger in her R hip and knee.  Subjective Questionnaire: FAAM 51/84 = 60%  Clinical Progress: improved  Home Program Compliance: Yes  Treatment has included: therapeutic exercise, neuromuscular re-education, manual therapy, therapeutic activity, gait training, electrical stimulation, moist heat and cryotherapy      Objective : Pt wearing new [employer] knee brace. Brace is preventing R knee hyperextension during stance phase of ambulation.     Strength  R ankle  DF 5/5  PF 4+/5  Inv 4/5  Ev 4/5    R knee  Ext 4/5 to 4+/5  Flex 4/5    AROM  Right Ankle/Foot   Dorsiflexion (ke): 6 degrees   Plantar flexion: 50 degrees   Inversion: 35 degrees   Eversion: 36 degrees     See Exercise, Manual, and Modality Logs for complete treatment.     Assessment/Plan : Pt has made good gains in R knee and ankle strength. Gait is significantly improved with new brace with improved control of R knee. Pt will benefit from continued R hip, knee, and ankle strengthening to further improve gait and functional mobility. Needs continued  proprioception and stability training of R LE to decrease fall risk.    Goals  Plan Goals: STG to be met in 3 wk:  - Increase R ankle DF AROM to 8 deg. - Progressing  - PT to assess SLS in 3-4 visits. - MET (22 seconds 3/14/2023)  - Pt to tolerate NuStep x10 min. - Progressing  - Pt to be independent with HEP in 3 weeks. MET  LTG to be met in 12 wk:  - Improve FAAM score to 80% or better ability. Progressing (current 60%)  - Increase R ankle strength to 4+/5 in all directions in order to walk in her yard safely. Progressing  - Pt to negotiate stairs with reciprocal pattern and 1 handrail to negotiate in community. Progressing (contingent pain level)  - Pt to ambulate in clinic without ankle brace with continuous pattern and R heel strike and toe off in order to walk in her home to complete housework. MET     Recommendations: Continue as planned  Timeframe: 2 months  Prognosis to achieve goals: good      Timed:         Manual Therapy:         mins  74481;     Therapeutic Exercise:    15     mins  36801;     Neuromuscular Annamarie:    5    mins  41958;    Therapeutic Activity:     10     mins  96202;     Gait Training:           mins  52610;     Ultrasound:          mins  88702;    Ionto                                   mins   30849  Self Care                            mins   87920      Un-Timed:  Electrical Stimulation:         mins  96284 ( );  Dry Needling          mins self-pay  Traction          mins 00326  Canalith Repos         mins 90631      Timed Treatment:   30   mins   Total Treatment:     30   mins    PT Signature: Jazmín Marcus, PT, CLT  PT License: [place] Lic # [id number]

## 2023-03-27 ENCOUNTER — HOSPITAL ENCOUNTER (OUTPATIENT)
Dept: PAIN MEDICINE | Facility: HOSPITAL | Age: 43
Discharge: HOME | End: 2023-03-27
Payer: MEDICAID

## 2023-03-27 VITALS
SYSTOLIC BLOOD PRESSURE: 115 MMHG | BODY MASS INDEX: 36.54 KG/M2 | TEMPERATURE: 98.1 F | DIASTOLIC BLOOD PRESSURE: 72 MMHG | HEIGHT: 64 IN | WEIGHT: 214 LBS | HEART RATE: 78 BPM | RESPIRATION RATE: 16 BRPM | OXYGEN SATURATION: 97 %

## 2023-03-27 DIAGNOSIS — R52 PAIN: ICD-10-CM

## 2023-03-27 DIAGNOSIS — G90.521 COMPLEX REGIONAL PAIN SYNDROME TYPE 1 OF RIGHT LOWER EXTREMITY: ICD-10-CM

## 2023-03-27 PROCEDURE — 77003 FLUOROGUIDE FOR SPINE INJECT: CPT

## 2023-03-27 PROCEDURE — 25010000002 CLONIDINE PER 1 MG

## 2023-03-27 PROCEDURE — 64520 N BLOCK LUMBAR/THORACIC: CPT | Performed by: STUDENT IN AN ORGANIZED HEALTH CARE EDUCATION/TRAINING PROGRAM

## 2023-03-27 PROCEDURE — 25010000002 DEXAMETHASONE SODIUM PHOSPHATE 10 MG/ML SOLUTION: Performed by: STUDENT IN AN ORGANIZED HEALTH CARE EDUCATION/TRAINING PROGRAM

## 2023-03-27 PROCEDURE — 25510000001 IOPAMIDOL 61 % SOLUTION: Performed by: STUDENT IN AN ORGANIZED HEALTH CARE EDUCATION/TRAINING PROGRAM

## 2023-03-27 PROCEDURE — 25010000002 ROPIVACAINE PER 1 MG: Performed by: STUDENT IN AN ORGANIZED HEALTH CARE EDUCATION/TRAINING PROGRAM

## 2023-03-27 RX ORDER — ROPIVACAINE HYDROCHLORIDE 2 MG/ML
5 INJECTION, SOLUTION EPIDURAL; INFILTRATION; PERINEURAL ONCE
Status: COMPLETED | OUTPATIENT
Start: 2023-03-27 | End: 2023-03-27

## 2023-03-27 RX ORDER — CLONIDINE 100 UG/ML
100 INJECTION, SOLUTION EPIDURAL ONCE
Status: COMPLETED | OUTPATIENT
Start: 2023-03-27 | End: 2023-03-27

## 2023-03-27 RX ORDER — CLONIDINE 100 UG/ML
INJECTION, SOLUTION EPIDURAL
Status: COMPLETED
Start: 2023-03-27 | End: 2023-03-27

## 2023-03-27 RX ORDER — DEXAMETHASONE SODIUM PHOSPHATE 10 MG/ML
10 INJECTION, SOLUTION INTRAMUSCULAR; INTRAVENOUS ONCE
Status: COMPLETED | OUTPATIENT
Start: 2023-03-27 | End: 2023-03-27

## 2023-03-27 RX ADMIN — IOPAMIDOL 10 ML: 612 INJECTION, SOLUTION INTRAVENOUS at 15:50

## 2023-03-27 RX ADMIN — DEXAMETHASONE SODIUM PHOSPHATE 10 MG: 10 INJECTION, SOLUTION INTRAMUSCULAR; INTRAVENOUS at 15:51

## 2023-03-27 RX ADMIN — ROPIVACAINE HYDROCHLORIDE 5 MG: 2 INJECTION EPIDURAL; INFILTRATION; PERINEURAL at 15:51

## 2023-03-27 RX ADMIN — CLONIDINE 100 MCG: 100 INJECTION, SOLUTION EPIDURAL at 15:51

## 2023-03-27 NOTE — PROCEDURES
Lumbar Sympathetic Blockade - right  [facility]    PREOPERATIVE DIAGNOSIS:    CRPS Type I of the  Right Lower Extremity    POSTOPERATIVE DIAGNOSIS: Same as preoperative dx    PROCEDURE:  Lumbar Sympathetic Block, right, with fluoroscopy                             Needle entry at: L3,  • CPT 02015, Lumbar Sympathetic Blockade    PRE-PROCEDURE DISCUSSION WITH PATIENT:    Risks and complications were discussed with the patient prior to starting the procedure and informed consent was obtained.   We discussed various topics including but not limited to bleeding, infection, injury, nerve injury, paralysis, coma, death, postprocedural painful flare-up, temporary entire leg weakness, numbness, temperature change, postprocedural site soreness, and a lack of pain relief.  We discussed the diagnostic aspect of lumbar sympathetic nerve blockade.    SURGEON:  Rakan Sanon MD    REASON FOR PROCEDURE:    CRPS Type 1 RLE     SEDATION:  Patient declined administration of moderate sedation      LOCAL ANESTHETIC: Ropi 0.2%  STEROID:   Dexamethasone 10mg   TOTAL VOLUME OF SOLUTION:  15 ml    DESCRIPTON OF PROCEDURE:    After obtaining informed consent, I.V. was started in the preop area.   The patient was taken to the operating room and placed in the prone position. All pressure points were well padded.      Starting in the oblique view toward the appropriate side, the transverse process of the appropriate level was identified. At the injection level, the tip of the transverse process was overlying the anterolateral margin of the vertebral body. The skin and subcutaneous tissue was anesthetized with 1% lidocaine just cephalad and anterolateral to the verebral body. A 22-gauge spinal needle was introduced cephalad to the transverse process and under fluorocopic guidance with serial images every 1 cm until the needle tip gently contacted the vertebral body. Under lateral view, the needle was gently advanced anteriorly  until the tip was over the anteiror one-third of the vertebral body. Needle position was verified in the AP fluoroscopic view with the needle tip lying medial to the lateral margin of the vertebral body. Aspiration was verified to be negative. 3 ml of omnipaque was injected under real time fluoroscopy, in both the lateral and AP demension, to verify lack of vascular uptake and appropriate spread cephalad and caudal. After appropriate spread was confirmed, the local anesthetic solution with steroid was injected with aspiration every couple ml. The needle was then removed intact.  Vital signs remained stable throughout.      ESTIMATED BLOOD LOSS:  <5 mL  SPECIMENS:  None    COMPLICATIONS: No complications were noted., There was no indication of vascular uptake on live injection of contrast dye., There was no indication of intrathecal uptake on live injection of contrast dye. and There was not any evidence of dural puncture.      TOLERANCE & DISCHARGE CONDITION:    The patient tolerated the procedure well.  The patient was transported to the recovery area without difficulties.  The patient was monitored for at least 30 minutes after the procedure, and temperature increase in the ipsilateral upper extremity was noted.  The patient was discharged to home under the care of family in stable and satisfactory condition.    PLAN OF CARE:  1. The patient was given our standard instruction sheet.  2. The patient will Return to clinic 4-6 wks.    3. The patient will resume all medications as per the medication reconciliation sheet.

## 2023-03-28 ENCOUNTER — TELEPHONE (OUTPATIENT)
Dept: PAIN MEDICINE | Facility: HOSPITAL | Age: 43
End: 2023-03-28
Payer: MEDICAID

## 2023-03-29 ENCOUNTER — TREATMENT (OUTPATIENT)
Dept: PHYSICAL THERAPY | Facility: CLINIC | Age: 43
End: 2023-03-29
Payer: MEDICAID

## 2023-03-29 DIAGNOSIS — R26.9 GAIT DISTURBANCE: ICD-10-CM

## 2023-03-29 DIAGNOSIS — G90.521 COMPLEX REGIONAL PAIN SYNDROME TYPE 1 OF RIGHT LOWER EXTREMITY: ICD-10-CM

## 2023-03-29 DIAGNOSIS — S93.324S DISLOCATION OF TARSOMETATARSAL JOINT OF RIGHT FOOT, SEQUELA: ICD-10-CM

## 2023-03-29 DIAGNOSIS — M79.671 RIGHT FOOT PAIN: ICD-10-CM

## 2023-03-29 DIAGNOSIS — M19.071 ARTHRITIS OF RIGHT FOOT: Primary | ICD-10-CM

## 2023-03-29 PROCEDURE — 97112 NEUROMUSCULAR REEDUCATION: CPT | Performed by: PHYSICAL THERAPIST

## 2023-03-29 PROCEDURE — 97530 THERAPEUTIC ACTIVITIES: CPT | Performed by: PHYSICAL THERAPIST

## 2023-03-29 PROCEDURE — 97110 THERAPEUTIC EXERCISES: CPT | Performed by: PHYSICAL THERAPIST

## 2023-03-29 NOTE — PROGRESS NOTES
Physical Therapy Daily Treatment Note      Patient: Vidya Becerra   : 1980  Diagnosis/ICD-10 Code:  Arthritis of right foot [M19.071]  Referring practitioner: JESSICA Malloy DPM  Date of Initial Visit: Type: THERAPY  Noted: 2023  Today's Date: 3/29/2023  Patient seen for 13 sessions         Vidya Becerra reports: she was in the hospital last night for a cough that had quickly progressed and gotten bad. Pt. States she was diagnosed with bronchitis and is being treated with medication and breathing treatments now but she is very tired today. Pt. Reports she has a sympathetic nerve block done recently which made her leg numb for a day in which she had no pain but then the following day the symptoms and pain returned stating it was not as effective as she had hoped.    Objective   See Exercise, Manual, and Modality Logs for complete treatment.     Assessment/Plan   Cardiovascular exercise was held this date and only slow controlled exercise was performed. Pt. responded well to this approach and is showing improving strength and mobility but pain and symptoms in foot are still present.     Goals  Plan Goals: STG to be met in 3 wk:  - Increase R ankle DF AROM to 8 deg. - Progressing  - PT to assess SLS in 3-4 visits. - MET (22 seconds 3/14/2023)  - Pt to tolerate NuStep x10 min. - Progressing  - Pt to be independent with HEP in 3 weeks. MET  LTG to be met in 12 wk:  - Improve FAAM score to 80% or better ability. Progressing (current 60%)  - Increase R ankle strength to 4+/5 in all directions in order to walk in her yard safely. Progressing  - Pt to negotiate stairs with reciprocal pattern and 1 handrail to negotiate in community. Progressing (contingent pain level)  - Pt to ambulate in clinic without ankle brace with continuous pattern and R heel strike and toe off in order to walk in her home to complete housework. MET    Progress strengthening /stabilization /functional activity           Timed:            Therapeutic Exercise:    15     mins  88216;     Neuromuscular Annamarie:    15    mins  17578;    Therapeutic Activity:     10     mins  26857;         Timed Treatment:   40   mins   Total Treatment:     40   mins    Xiomara Osborne PTA  Physical Therapist Assistant License #[id number]

## 2023-03-31 ENCOUNTER — TELEPHONE (OUTPATIENT)
Dept: PHYSICAL THERAPY | Facility: CLINIC | Age: 43
End: 2023-03-31

## 2023-04-06 ENCOUNTER — TREATMENT (OUTPATIENT)
Dept: PHYSICAL THERAPY | Facility: CLINIC | Age: 43
End: 2023-04-06
Payer: MEDICAID

## 2023-04-06 DIAGNOSIS — R26.9 GAIT DISTURBANCE: ICD-10-CM

## 2023-04-06 DIAGNOSIS — S93.324S DISLOCATION OF TARSOMETATARSAL JOINT OF RIGHT FOOT, SEQUELA: ICD-10-CM

## 2023-04-06 DIAGNOSIS — M19.071 ARTHRITIS OF RIGHT FOOT: Primary | ICD-10-CM

## 2023-04-06 DIAGNOSIS — M79.671 RIGHT FOOT PAIN: ICD-10-CM

## 2023-04-06 DIAGNOSIS — G90.521 COMPLEX REGIONAL PAIN SYNDROME TYPE 1 OF RIGHT LOWER EXTREMITY: ICD-10-CM

## 2023-04-06 PROCEDURE — 97110 THERAPEUTIC EXERCISES: CPT | Performed by: PHYSICAL THERAPIST

## 2023-04-06 PROCEDURE — 97112 NEUROMUSCULAR REEDUCATION: CPT | Performed by: PHYSICAL THERAPIST

## 2023-04-06 NOTE — PROGRESS NOTES
Physical Therapy Progress Note/Reassessment  [address] [place] [address] , [place] [address]    Patient: Vidya Becerra   : 1980  Diagnosis/ICD-10 Code:  Arthritis of right foot [M19.071]  Referring practitioner: JESSICA Malloy DPM  Date of Initial Evaluation:  Type: THERAPY  Noted: 2023  Patient seen for 14 sessions      Subjective:   Visit Diagnoses:    ICD-10-CM ICD-9-CM   1. Arthritis of right foot  M19.071 716.97   2. Right foot pain  M79.671 729.5   3. Dislocation of tarsometatarsal joint of right foot, sequela  S93.324S 905.6   4. Gait disturbance  R26.9 781.2   5. Complex regional pain syndrome type 1 of right lower extremity  G90.521 337.22         Vidya Still reports her foot is still hurting. States her knee feels good and she loves her new brace. Does not know if less pain is related to brace or gel injection. When walking at night without brace on, she does have knee pain. If she hyperextends knee in bed at night, it wakes her up.  Subjective Questionnaire: FAAM = 46/84, 55% ability  Clinical Progress: improved  Home Program Compliance: Yes  Treatment has included: therapeutic exercise, neuromuscular re-education, manual therapy, therapeutic activity, gait training, electrical stimulation, moist heat and cryotherapy      Objective          Active Range of Motion     Right Ankle/Foot   Dorsiflexion (ke): 14 degrees   Plantar flexion: 60 degrees   Inversion: 30 degrees   Eversion: 42 degrees     Strength/Myotome Testing     Right Knee   Flexion: 4+  Extension: 4+    Right Ankle/Foot   Dorsiflexion: 5  Plantar flexion: 5  Inversion: 4+  Eversion: 4    Additional Strength Details  R hip strength 4+/5      Gait: no hyperextension of R knee, decreased R toe off, mild antalgic pattern.  See Exercise, Manual, and Modality Logs for complete treatment.     Assessment/Plan : Pt had a short lapse in PT due to being ill with bronchitis. Pt's gait pattern has improved significantly with new knee brace to  control knee hyperextension during stance phase. Continues to have constant R foot pain and only had short term relief with sympathetic nerve block procedure. Inversion strength has improved from 4/5 on 3/14/2023 to 4+/5 today. DF ROM improved and has met goal. FAAM indicates slight decline but pt is demonstrating improved gait and reporting improved ability since receiving knee brace. Walking on uneven ground (on FAAM) has improved from moderate difficulty to slight difficulty. Foot pain continues to be limiting at times. Pt not needing ankle brace any longer as ankle strength has improved. Wearing shoe inserts consistently. Good tolerance to ther ex this date. Progression of exercise with focus on balance and proprioception activities to improved stability of R ankle on uneven surfaces.    Goals  Plan Goals: STG to be met in 3 wk:  - Increase R ankle DF AROM to 8 deg. - MET, currently at 14 deg, 4/6/2023  - PT to assess SLS in 3-4 visits. - MET (22 seconds 3/14/2023)  - Pt to tolerate NuStep x10 min. - Progressing  - Pt to be independent with HEP in 3 weeks. MET  LTG to be met in 12 wk:  - Improve FAAM score to 80% or better ability. Progressing (current 60%)  - Increase R ankle strength to 4+/5 in all directions in order to walk in her yard safely. Progressing  - Pt to negotiate stairs with reciprocal pattern and 1 handrail to negotiate in community. Progressing (contingent pain level)  - Pt to ambulate in clinic without ankle brace with continuous pattern and R heel strike and toe off in order to walk in her home to complete housework. - MET       Recommendations: Continue as planned  Timeframe: 1 month  Prognosis to achieve goals: good      Timed:         Manual Therapy:         mins  03112;     Therapeutic Exercise:    15     mins  54819;     Neuromuscular Annamarie:    15    mins  56584;    Therapeutic Activity:          mins  57300;     Gait Training:           mins  74601;     Ultrasound:          mins  73503;     Ionto                                   mins   61392  Self Care                            mins   70977      Un-Timed:  Electrical Stimulation:         mins  19736 ( );  Dry Needling          mins self-pay  Traction          mins 87812  Canalith Repos         mins 88157      Timed Treatment:   30   mins   Total Treatment:     30   mins    PT Signature: Jazmín Marcus PT, CLT  PT License: [place] Lic # [id number]

## 2023-04-10 ENCOUNTER — TREATMENT (OUTPATIENT)
Dept: PHYSICAL THERAPY | Facility: CLINIC | Age: 43
End: 2023-04-10
Payer: MEDICAID

## 2023-04-10 DIAGNOSIS — M79.671 RIGHT FOOT PAIN: ICD-10-CM

## 2023-04-10 DIAGNOSIS — G90.521 COMPLEX REGIONAL PAIN SYNDROME TYPE 1 OF RIGHT LOWER EXTREMITY: ICD-10-CM

## 2023-04-10 DIAGNOSIS — M19.071 ARTHRITIS OF RIGHT FOOT: Primary | ICD-10-CM

## 2023-04-10 DIAGNOSIS — R26.9 GAIT DISTURBANCE: ICD-10-CM

## 2023-04-10 DIAGNOSIS — S93.324S DISLOCATION OF TARSOMETATARSAL JOINT OF RIGHT FOOT, SEQUELA: ICD-10-CM

## 2023-04-10 PROCEDURE — 97112 NEUROMUSCULAR REEDUCATION: CPT | Performed by: PHYSICAL THERAPIST

## 2023-04-10 PROCEDURE — 97110 THERAPEUTIC EXERCISES: CPT | Performed by: PHYSICAL THERAPIST

## 2023-04-10 NOTE — PROGRESS NOTES
Physical Therapy Daily Treatment Note      Patient: Vidya Becerra   : 1980  Diagnosis/ICD-10 Code:  Arthritis of right foot [M19.071]   Problems Addressed this Visit    None  Visit Diagnoses     Arthritis of right foot    -  Primary    Right foot pain        Dislocation of tarsometatarsal joint of right foot, sequela        Gait disturbance        Complex regional pain syndrome type 1 of right lower extremity          Diagnoses       Codes Comments    Arthritis of right foot    -  Primary ICD-10-CM: M19.071  ICD-9-CM: 716.97     Right foot pain     ICD-10-CM: M79.671  ICD-9-CM: 729.5     Dislocation of tarsometatarsal joint of right foot, sequela     ICD-10-CM: S93.324S  ICD-9-CM: 905.6     Gait disturbance     ICD-10-CM: R26.9  ICD-9-CM: 781.2     Complex regional pain syndrome type 1 of right lower extremity     ICD-10-CM: G90.521  ICD-9-CM: 337.22         Referring practitioner: JESSICA Malloy DPM  Date of Initial Visit: Type: THERAPY  Noted: 2023  Today's Date: 4/10/2023    VISIT#: 15    Subjective : pt reports that hurt foot is more swollen today and more sore from being on her feet a lot yesterday with Easter and grandkids being over. States she was on her feet all day and foot was burning but she kept going.    Objective : standing on rocker board in saggitial plane challenging with legs staggered and R LE in back.    See Exercise, Manual, and Modality Logs for complete treatment.     Assessment/Plan : Good tolerance to ther ex with no increase in symptoms. New knee brace has significantly improved gait mechanics with improved knee stability. Pt will benefit from continued R hip, knee, and ankle strengthening to further improve gait and functional mobility. Needs continued proprioception and stability training of R LE to decrease fall risk.     Goals  Plan Goals: STG to be met in 3 wk:  - Increase R ankle DF AROM to 8 deg. - Progressing  - PT to assess SLS in 3-4 visits. - MET (22 seconds  3/14/2023)  - Pt to tolerate NuStep x10 min. - Progressing  - Pt to be independent with HEP in 3 weeks. MET  LTG to be met in 12 wk:  - Improve FAAM score to 80% or better ability. Progressing (current 60%)  - Increase R ankle strength to 4+/5 in all directions in order to walk in her yard safely. Progressing  - Pt to negotiate stairs with reciprocal pattern and 1 handrail to negotiate in community. Progressing (contingent pain level)  - Pt to ambulate in clinic without ankle brace with continuous pattern and R heel strike and toe off in order to walk in her home to complete housework. MET    Progress per Plan of Care and Progress strengthening /stabilization /functional activity         Timed:         Manual Therapy:         mins  70943;     Therapeutic Exercise:    15     mins  25121;     Neuromuscular Annamarie:    10    mins  21189;    Therapeutic Activity:          mins  30367;     Gait Training:           mins  70571;     Ultrasound:          mins  02043;    Ionto                                   mins   93533  Self Care                            mins   62341    Un-Timed:  Electrical Stimulation:         mins  57452 ( );  Dry Needling          mins 42052/16493  Traction          mins 88133  Canalith Repos                   mins  50083  Low Eval          Mins  51913  Mod Eval          Mins  82445  High Eval                            Mins  79595  Re-Eval                               mins  71282    Timed Treatment:   25   mins   Total Treatment:     25   mins    Jazmín Marcus, PT, CLT, Cert DN  Physical Therapist  [place] Lic # [id number]

## 2023-04-11 ENCOUNTER — OFFICE VISIT (OUTPATIENT)
Dept: PODIATRY | Facility: CLINIC | Age: 43
End: 2023-04-11
Payer: MEDICAID

## 2023-04-11 VITALS — BODY MASS INDEX: 36.54 KG/M2 | RESPIRATION RATE: 20 BRPM | WEIGHT: 214 LBS | HEIGHT: 64 IN

## 2023-04-11 DIAGNOSIS — Z96.9 PRESENCE OF RETAINED HARDWARE: ICD-10-CM

## 2023-04-11 DIAGNOSIS — M79.671 CHRONIC FOOT PAIN, RIGHT: Primary | ICD-10-CM

## 2023-04-11 DIAGNOSIS — M21.41 ACQUIRED PES PLANUS, RIGHT: ICD-10-CM

## 2023-04-11 DIAGNOSIS — G89.29 CHRONIC FOOT PAIN, RIGHT: Primary | ICD-10-CM

## 2023-04-11 DIAGNOSIS — S92.324K: ICD-10-CM

## 2023-04-11 DIAGNOSIS — G57.71 COMPLEX REGIONAL PAIN SYNDROME TYPE 2 OF RIGHT LOWER EXTREMITY: ICD-10-CM

## 2023-04-11 DIAGNOSIS — M19.071 ARTHRITIS OF RIGHT FOOT: ICD-10-CM

## 2023-04-11 PROCEDURE — 1160F RVW MEDS BY RX/DR IN RCRD: CPT | Performed by: PODIATRIST

## 2023-04-11 PROCEDURE — 99214 OFFICE O/P EST MOD 30 MIN: CPT | Performed by: PODIATRIST

## 2023-04-11 PROCEDURE — 1159F MED LIST DOCD IN RCRD: CPT | Performed by: PODIATRIST

## 2023-04-11 NOTE — H&P (VIEW-ONLY)
"04/11/2023  Foot and Ankle Surgery - Established Patient/Follow-up  Provider: Dr. Domenic Malloy DPM  Location: [facility]    Subjective:  Vidya Becerra is a 43 y.o. female.     Chief Complaint   Patient presents with   • Right Foot - Fracture, Follow-up     NONDISPLACED FX 2ND METATARSAL BONE    • Follow-up     BOGDAN MILES GENEVA       The patient is a 43-year-old female who presents to the office today for a follow-up regarding right ankle pain. She is accompanied by an adult male today.    The patient was last seen approximately 2 months ago. She states continues to experience issues regarding the lateral aspect of her right foot and denies improvement to the dorsal aspect of her right foot. The patient states physical therapy has helped with strengthening, and she feels as if she can hold herself better with regards to her ankle. However, she continues to experience the worst pain to the dorsal aspect of her right foot when stepping down, which she describes as a sensation of a \"boulder.\"  She states it feels as though she is ambulating on a fractured foot. The patient denies receiving a localized injection from our office; however, she has received localized injections previously, performed by Dr. Amezquita, which did not alleviate her symptoms. She denies any relief to her foot secondary to the spinal injection from Dr. Sanon, pain management. She states she began experiencing pain in her foot later that night. The patient inquires about ordering a cooler as she received this with her last foot surgery in 2017, and it helped significantly. She denies taking any medication for pain management currently.     The patient states she received a gel injection in her right knee in 03/2023, and has been wearing a custom knee brace, which helps with stability and improved ambulation. She hopes the brace will keep her from having to undergo a knee arthroplasty for approximately 10 years. She was informed she could " "receive a repeat gel injection in approximately 6 months if necessary.     The patient reports a history of 3 surgeries on her right foot.    No Known Allergies    Current Outpatient Medications on File Prior to Visit   Medication Sig Dispense Refill   • albuterol sulfate  (90 Base) MCG/ACT inhaler INHALE 1-2 PUFF USING INHALER EVERY FOUR TO SIX HOURS AS NEEDED FOR SHORTNESS OF AIR COUGH/WHEEZING.     • aspirin 81 MG EC tablet Take 1 tablet by mouth Daily. 30 tablet 0   • atorvastatin (LIPITOR) 40 MG tablet TAKE 1 TABLET BY MOUTH EVERY DAY AT NIGHT 90 tablet 3   • baclofen (LIORESAL) 10 MG tablet Take 1 tablet by mouth 3 (Three) Times a Day.     • escitalopram (LEXAPRO) 10 MG tablet Take 1 tablet by mouth Daily.     • gabapentin (NEURONTIN) 800 MG tablet      • lisinopril-hydrochlorothiazide (PRINZIDE,ZESTORETIC) 20-12.5 MG per tablet TAKE 1 TABLET BY MOUTH EVERY DAY 30 tablet 5   • meloxicam (MOBIC) 15 MG tablet Take 1 tablet by mouth Daily.     • pantoprazole (PROTONIX) 40 MG EC tablet      • promethazine-dextromethorphan (PROMETHAZINE-DM) 6.25-15 MG/5ML syrup TAKE 5 ML BY MOUTH EVERY FOUR HOURS AS NEEDED FOR COUGH, DO NOT DRIVE WHILE ON MED DUE TO DROWSINESS     • cyclobenzaprine (FLEXERIL) 5 MG tablet Take 1 tablet by mouth.       No current facility-administered medications on file prior to visit.       Objective   Resp 20   Ht 162.6 cm (64\")   Wt 97.1 kg (214 lb)   BMI 36.73 kg/m²     Foot/Ankle Exam    GENERAL  Orientation:  AAOx3  Affect:  appropriate    VASCULAR     Right Foot Vascularity   Normal vascular exam    Dorsalis pedis:  2+  Posterior tibial:  2+  Skin temperature:  warm  Edema grading:  None  CFT:  < 3 seconds  Pedal hair growth:  Present  Varicosities:  none     Left Foot Vascularity   Normal vascular exam    Dorsalis pedis:  2+  Posterior tibial:  2+  Skin temperature:  warm  Edema grading:  None  CFT:  < 3 seconds  Pedal hair growth:  Present  Varicosities:  none     NEUROLOGIC     " Right Foot Neurologic   Light touch sensation: normal  Hot/Cold sensation: normal  Achilles reflex:  2+     Left Foot Neurologic   Light touch sensation: normal  Hot/Cold sensation:  normal  Achilles reflex:  2+    MUSCULOSKELETAL     Right Foot Musculoskeletal   Arch:  Normal     Left Foot Musculoskeletal   Arch:  Normal    MUSCLE STRENGTH     Right Foot Muscle Strength   Normal strength    Foot dorsiflexion:  5  Foot plantar flexion:  5  Foot inversion:  5  Foot eversion:  5     Left Foot Muscle Strength   Normal strength    Foot dorsiflexion:  5  Foot plantar flexion:  5  Foot inversion:  5  Foot eversion:  5    DERMATOLOGIC      Right Foot Dermatologic   Skin  Right foot skin is intact.      Left Foot Dermatologic   Skin  Left foot skin is intact.     TESTS     Right Foot Tests   Anterior drawer: negative  Varus tilt: negative     Left Foot Tests   Anterior drawer: negative  Varus tilt: negative     Right foot additional comments: Moderate discomfort with palpation to the midfoot. Stable scar formations involving the dorsal aspect of the foot and lateral aspect of the rear foot. No open wounds, signs of inflammation or infection. Mild deformity with decreased medial arch. Limited range of motion to the midfoot. Knee brace in place to the right lower extremity.    04/11/2023  No progressive deformity or instability.      Assessment & Plan   Diagnoses and all orders for this visit:    1. Chronic foot pain, right (Primary)  -     XR Foot 3+ View Right  -     XR Ankle 3+ View Right    2. Nondisplaced fracture of second metatarsal bone, right foot, subsequent encounter for fracture with nonunion  -     XR Foot 3+ View Right  -     XR Ankle 3+ View Right  -     ECG 12 Lead; Future  -     XR Chest 2 View; Future  -     Case Request; Standing  -     ceFAZolin (ANCEF) 2 g in sodium chloride 0.9 % 100 mL IVPB  -     Case Request    3. Arthritis of right foot  -     ECG 12 Lead; Future  -     XR Chest 2 View; Future  -      Case Request; Standing  -     ceFAZolin (ANCEF) 2 g in sodium chloride 0.9 % 100 mL IVPB  -     Case Request    4. Presence of retained hardware  -     ECG 12 Lead; Future  -     XR Chest 2 View; Future  -     Case Request; Standing  -     ceFAZolin (ANCEF) 2 g in sodium chloride 0.9 % 100 mL IVPB  -     Case Request    5. Complex regional pain syndrome type 2 of right lower extremity    6. Acquired pes planus, right    Other orders  -     Follow Anesthesia Guidelines / Protocol; Future  -     Obtain Informed Consent; Future  -     Provide NPO Instructions to Patient; Future  -     Chlorhexidine Skin Prep; Future  -     Follow Anesthesia Guidelines / Protocol; Standing  -     Verify / Perform Chlorhexidine Skin Prep; Standing  -     Verify / Perform Chlorhexidine Skin Prep if Indicated (If Not Already Completed); Standing      Patient returns for continued pain and limitation involving her right foot.  She feels like she is walking on a [place] to the midfoot region of the right foot.  She continues to have numbness and pain involving the lateral aspect of the foot and ankle.  Her symptoms have continued to cause progressive issues and concerns.  She did obtain a custom knee brace for the right lower extremity and did not notice any recent improvement from her epidural.  She would like to discuss definitive treatment options involving the foot.  She has a very complicated situation given that she has nonunion involving the midfoot and cj disruption and instability.  Patient also has evidence of complex regional pain syndrome affecting the lateral aspect of the foot.  Patient would like to observe the lateral aspect of the foot.  We did discuss surgical options with regarding the midfoot which would include hardware removal, midfoot reduction, first, second, and third tarsometatarsal joint arthrodesis with calcaneal autograft harvest.  We reviewed the procedures, risks, goals, and recovery at length.  She  understands that she would require extensive nonweightbearing and decreased overall activity after the surgery.  She understands risks of continued delayed and nonunion with persistent pain and limitation.  We did review concerns of guarded prognosis secondary to multiple previous surgeries as well as her CRPS.  Patient understands and agrees and would like to proceed with surgery in the near future.  All questions were answered to patient's satisfaction.  Greater than 30 minutes was spent before, during, and after evaluation for patient care.    Orders Placed This Encounter   Procedures   • XR Foot 3+ View Right     Order Specific Question:   Reason for Exam:     Answer:   LATERALAND TOP OF FOOT  PAIN    ROOM 15   WB     Order Specific Question:   Patient Pregnant     Answer:   No     Order Specific Question:   Does this patient have a diabetic monitoring/medication delivering device on?     Answer:   No     Order Specific Question:   Release to patient     Answer:   Routine Release   • XR Ankle 3+ View Right     Order Specific Question:   Reason for Exam:     Answer:   LATERAL ANKLE PAIN    ROOM 15   WB     Order Specific Question:   Patient Pregnant     Answer:   No     Order Specific Question:   Does this patient have a diabetic monitoring/medication delivering device on?     Answer:   No     Order Specific Question:   Release to patient     Answer:   Routine Release   • XR Chest 2 View     Standing Status:   Future     Standing Expiration Date:   4/12/2024     Order Specific Question:   Reason for Exam:     Answer:   Preop     Order Specific Question:   Patient Pregnant     Answer:   Unknown   • Obtain Informed Consent     Standing Status:   Future     Order Specific Question:   Informed Consent Given For     Answer:   Hardware removal along with first, second, and third tarsometatarsal joint arthrodesis and midfoot reduction with calcaneal autograft harvest all to the right lower extremity   • Provide NPO  Instructions to Patient     Standing Status:   Future   • Chlorhexidine Skin Prep     Chlorhexidine Skin Prep and Instructions For All Patients Having A Procedure Requiring an Outward Incision if Not Allergic. If Allergic, Give Antibacterial Skin Wipes and Instructions. Do Not Use For Facial Cases or on Any Mucus Membranes.     Standing Status:   Future   • ECG 12 Lead     Standing Status:   Future     Standing Expiration Date:   4/12/2024     Order Specific Question:   Reason for Exam:     Answer:   Preop          Note is dictated utilizing voice recognition software. Unfortunately this leads to occasional typographical errors. I apologize in advance if the situation occurs. If questions occur please do not hesitate to call our office.    Transcribed from ambient dictation for JESSICA Malloy DPM by Carmencita Flores.  04/11/23   14:41 EDT    Patient or patient representative verbalized consent to the visit recording.  I have personally performed the services described in this document as transcribed by the above individual, and it is both accurate and complete.

## 2023-04-11 NOTE — PROGRESS NOTES
"04/11/2023  Foot and Ankle Surgery - Established Patient/Follow-up  Provider: Dr. Domenic Malloy DPM  Location: [facility]    Subjective:  Vidya Becerra is a 43 y.o. female.     Chief Complaint   Patient presents with   • Right Foot - Fracture, Follow-up     NONDISPLACED FX 2ND METATARSAL BONE    • Follow-up     BOGDAN MILES GENEVA       The patient is a 43-year-old female who presents to the office today for a follow-up regarding right ankle pain. She is accompanied by an adult male today.    The patient was last seen approximately 2 months ago. She states continues to experience issues regarding the lateral aspect of her right foot and denies improvement to the dorsal aspect of her right foot. The patient states physical therapy has helped with strengthening, and she feels as if she can hold herself better with regards to her ankle. However, she continues to experience the worst pain to the dorsal aspect of her right foot when stepping down, which she describes as a sensation of a \"boulder.\"  She states it feels as though she is ambulating on a fractured foot. The patient denies receiving a localized injection from our office; however, she has received localized injections previously, performed by Dr. Amezquita, which did not alleviate her symptoms. She denies any relief to her foot secondary to the spinal injection from Dr. Sanon, pain management. She states she began experiencing pain in her foot later that night. The patient inquires about ordering a cooler as she received this with her last foot surgery in 2017, and it helped significantly. She denies taking any medication for pain management currently.     The patient states she received a gel injection in her right knee in 03/2023, and has been wearing a custom knee brace, which helps with stability and improved ambulation. She hopes the brace will keep her from having to undergo a knee arthroplasty for approximately 10 years. She was informed she could " "receive a repeat gel injection in approximately 6 months if necessary.     The patient reports a history of 3 surgeries on her right foot.    No Known Allergies    Current Outpatient Medications on File Prior to Visit   Medication Sig Dispense Refill   • albuterol sulfate  (90 Base) MCG/ACT inhaler INHALE 1-2 PUFF USING INHALER EVERY FOUR TO SIX HOURS AS NEEDED FOR SHORTNESS OF AIR COUGH/WHEEZING.     • aspirin 81 MG EC tablet Take 1 tablet by mouth Daily. 30 tablet 0   • atorvastatin (LIPITOR) 40 MG tablet TAKE 1 TABLET BY MOUTH EVERY DAY AT NIGHT 90 tablet 3   • baclofen (LIORESAL) 10 MG tablet Take 1 tablet by mouth 3 (Three) Times a Day.     • escitalopram (LEXAPRO) 10 MG tablet Take 1 tablet by mouth Daily.     • gabapentin (NEURONTIN) 800 MG tablet      • lisinopril-hydrochlorothiazide (PRINZIDE,ZESTORETIC) 20-12.5 MG per tablet TAKE 1 TABLET BY MOUTH EVERY DAY 30 tablet 5   • meloxicam (MOBIC) 15 MG tablet Take 1 tablet by mouth Daily.     • pantoprazole (PROTONIX) 40 MG EC tablet      • promethazine-dextromethorphan (PROMETHAZINE-DM) 6.25-15 MG/5ML syrup TAKE 5 ML BY MOUTH EVERY FOUR HOURS AS NEEDED FOR COUGH, DO NOT DRIVE WHILE ON MED DUE TO DROWSINESS     • cyclobenzaprine (FLEXERIL) 5 MG tablet Take 1 tablet by mouth.       No current facility-administered medications on file prior to visit.       Objective   Resp 20   Ht 162.6 cm (64\")   Wt 97.1 kg (214 lb)   BMI 36.73 kg/m²     Foot/Ankle Exam    GENERAL  Orientation:  AAOx3  Affect:  appropriate    VASCULAR     Right Foot Vascularity   Normal vascular exam    Dorsalis pedis:  2+  Posterior tibial:  2+  Skin temperature:  warm  Edema grading:  None  CFT:  < 3 seconds  Pedal hair growth:  Present  Varicosities:  none     Left Foot Vascularity   Normal vascular exam    Dorsalis pedis:  2+  Posterior tibial:  2+  Skin temperature:  warm  Edema grading:  None  CFT:  < 3 seconds  Pedal hair growth:  Present  Varicosities:  none     NEUROLOGIC     " Right Foot Neurologic   Light touch sensation: normal  Hot/Cold sensation: normal  Achilles reflex:  2+     Left Foot Neurologic   Light touch sensation: normal  Hot/Cold sensation:  normal  Achilles reflex:  2+    MUSCULOSKELETAL     Right Foot Musculoskeletal   Arch:  Normal     Left Foot Musculoskeletal   Arch:  Normal    MUSCLE STRENGTH     Right Foot Muscle Strength   Normal strength    Foot dorsiflexion:  5  Foot plantar flexion:  5  Foot inversion:  5  Foot eversion:  5     Left Foot Muscle Strength   Normal strength    Foot dorsiflexion:  5  Foot plantar flexion:  5  Foot inversion:  5  Foot eversion:  5    DERMATOLOGIC      Right Foot Dermatologic   Skin  Right foot skin is intact.      Left Foot Dermatologic   Skin  Left foot skin is intact.     TESTS     Right Foot Tests   Anterior drawer: negative  Varus tilt: negative     Left Foot Tests   Anterior drawer: negative  Varus tilt: negative     Right foot additional comments: Moderate discomfort with palpation to the midfoot. Stable scar formations involving the dorsal aspect of the foot and lateral aspect of the rear foot. No open wounds, signs of inflammation or infection. Mild deformity with decreased medial arch. Limited range of motion to the midfoot. Knee brace in place to the right lower extremity.    04/11/2023  No progressive deformity or instability.      Assessment & Plan   Diagnoses and all orders for this visit:    1. Chronic foot pain, right (Primary)  -     XR Foot 3+ View Right  -     XR Ankle 3+ View Right    2. Nondisplaced fracture of second metatarsal bone, right foot, subsequent encounter for fracture with nonunion  -     XR Foot 3+ View Right  -     XR Ankle 3+ View Right  -     ECG 12 Lead; Future  -     XR Chest 2 View; Future  -     Case Request; Standing  -     ceFAZolin (ANCEF) 2 g in sodium chloride 0.9 % 100 mL IVPB  -     Case Request    3. Arthritis of right foot  -     ECG 12 Lead; Future  -     XR Chest 2 View; Future  -      Case Request; Standing  -     ceFAZolin (ANCEF) 2 g in sodium chloride 0.9 % 100 mL IVPB  -     Case Request    4. Presence of retained hardware  -     ECG 12 Lead; Future  -     XR Chest 2 View; Future  -     Case Request; Standing  -     ceFAZolin (ANCEF) 2 g in sodium chloride 0.9 % 100 mL IVPB  -     Case Request    5. Complex regional pain syndrome type 2 of right lower extremity    6. Acquired pes planus, right    Other orders  -     Follow Anesthesia Guidelines / Protocol; Future  -     Obtain Informed Consent; Future  -     Provide NPO Instructions to Patient; Future  -     Chlorhexidine Skin Prep; Future  -     Follow Anesthesia Guidelines / Protocol; Standing  -     Verify / Perform Chlorhexidine Skin Prep; Standing  -     Verify / Perform Chlorhexidine Skin Prep if Indicated (If Not Already Completed); Standing      Patient returns for continued pain and limitation involving her right foot.  She feels like she is walking on a [place] to the midfoot region of the right foot.  She continues to have numbness and pain involving the lateral aspect of the foot and ankle.  Her symptoms have continued to cause progressive issues and concerns.  She did obtain a custom knee brace for the right lower extremity and did not notice any recent improvement from her epidural.  She would like to discuss definitive treatment options involving the foot.  She has a very complicated situation given that she has nonunion involving the midfoot and cj disruption and instability.  Patient also has evidence of complex regional pain syndrome affecting the lateral aspect of the foot.  Patient would like to observe the lateral aspect of the foot.  We did discuss surgical options with regarding the midfoot which would include hardware removal, midfoot reduction, first, second, and third tarsometatarsal joint arthrodesis with calcaneal autograft harvest.  We reviewed the procedures, risks, goals, and recovery at length.  She  understands that she would require extensive nonweightbearing and decreased overall activity after the surgery.  She understands risks of continued delayed and nonunion with persistent pain and limitation.  We did review concerns of guarded prognosis secondary to multiple previous surgeries as well as her CRPS.  Patient understands and agrees and would like to proceed with surgery in the near future.  All questions were answered to patient's satisfaction.  Greater than 30 minutes was spent before, during, and after evaluation for patient care.    Orders Placed This Encounter   Procedures   • XR Foot 3+ View Right     Order Specific Question:   Reason for Exam:     Answer:   LATERALAND TOP OF FOOT  PAIN    ROOM 15   WB     Order Specific Question:   Patient Pregnant     Answer:   No     Order Specific Question:   Does this patient have a diabetic monitoring/medication delivering device on?     Answer:   No     Order Specific Question:   Release to patient     Answer:   Routine Release   • XR Ankle 3+ View Right     Order Specific Question:   Reason for Exam:     Answer:   LATERAL ANKLE PAIN    ROOM 15   WB     Order Specific Question:   Patient Pregnant     Answer:   No     Order Specific Question:   Does this patient have a diabetic monitoring/medication delivering device on?     Answer:   No     Order Specific Question:   Release to patient     Answer:   Routine Release   • XR Chest 2 View     Standing Status:   Future     Standing Expiration Date:   4/12/2024     Order Specific Question:   Reason for Exam:     Answer:   Preop     Order Specific Question:   Patient Pregnant     Answer:   Unknown   • Obtain Informed Consent     Standing Status:   Future     Order Specific Question:   Informed Consent Given For     Answer:   Hardware removal along with first, second, and third tarsometatarsal joint arthrodesis and midfoot reduction with calcaneal autograft harvest all to the right lower extremity   • Provide NPO  Instructions to Patient     Standing Status:   Future   • Chlorhexidine Skin Prep     Chlorhexidine Skin Prep and Instructions For All Patients Having A Procedure Requiring an Outward Incision if Not Allergic. If Allergic, Give Antibacterial Skin Wipes and Instructions. Do Not Use For Facial Cases or on Any Mucus Membranes.     Standing Status:   Future   • ECG 12 Lead     Standing Status:   Future     Standing Expiration Date:   4/12/2024     Order Specific Question:   Reason for Exam:     Answer:   Preop          Note is dictated utilizing voice recognition software. Unfortunately this leads to occasional typographical errors. I apologize in advance if the situation occurs. If questions occur please do not hesitate to call our office.    Transcribed from ambient dictation for JESSICA Malloy DPM by Carmencita Flores.  04/11/23   14:41 EDT    Patient or patient representative verbalized consent to the visit recording.  I have personally performed the services described in this document as transcribed by the above individual, and it is both accurate and complete.

## 2023-04-12 PROBLEM — M19.071 ARTHRITIS OF RIGHT FOOT: Status: ACTIVE | Noted: 2023-04-12

## 2023-04-12 PROBLEM — S92.324K: Status: ACTIVE | Noted: 2023-04-12

## 2023-04-12 PROBLEM — Z96.9 PRESENCE OF RETAINED HARDWARE: Status: ACTIVE | Noted: 2023-04-12

## 2023-04-13 ENCOUNTER — TELEPHONE (OUTPATIENT)
Dept: PHYSICAL THERAPY | Facility: CLINIC | Age: 43
End: 2023-04-13

## 2023-04-17 ENCOUNTER — HOSPITAL ENCOUNTER (OUTPATIENT)
Dept: CARDIOLOGY | Facility: HOSPITAL | Age: 43
Discharge: HOME | End: 2023-04-17
Payer: MEDICAID

## 2023-04-17 ENCOUNTER — LAB (OUTPATIENT)
Dept: LAB | Facility: HOSPITAL | Age: 43
End: 2023-04-17
Payer: MEDICAID

## 2023-04-17 ENCOUNTER — HOSPITAL ENCOUNTER (OUTPATIENT)
Dept: GENERAL RADIOLOGY | Facility: HOSPITAL | Age: 43
Discharge: HOME | End: 2023-04-17
Payer: MEDICAID

## 2023-04-17 DIAGNOSIS — E78.2 MIXED HYPERLIPIDEMIA: ICD-10-CM

## 2023-04-17 DIAGNOSIS — S92.324K: ICD-10-CM

## 2023-04-17 DIAGNOSIS — I10 ESSENTIAL HYPERTENSION: ICD-10-CM

## 2023-04-17 DIAGNOSIS — M19.071 ARTHRITIS OF RIGHT FOOT: ICD-10-CM

## 2023-04-17 DIAGNOSIS — Z96.9 PRESENCE OF RETAINED HARDWARE: ICD-10-CM

## 2023-04-17 LAB
ANION GAP SERPL CALCULATED.3IONS-SCNC: 12 MMOL/L (ref 5–15)
BUN SERPL-MCNC: 6 MG/DL (ref 6–20)
BUN/CREAT SERPL: 8.6 (ref 7–25)
CALCIUM SPEC-SCNC: 9.4 MG/DL (ref 8.6–10.5)
CHLORIDE SERPL-SCNC: 100 MMOL/L (ref 98–107)
CHOLEST SERPL-MCNC: 170 MG/DL (ref 0–200)
CO2 SERPL-SCNC: 24 MMOL/L (ref 22–29)
CREAT SERPL-MCNC: 0.7 MG/DL (ref 0.57–1)
DEPRECATED RDW RBC AUTO: 40 FL (ref 37–54)
EGFRCR SERPLBLD CKD-EPI 2021: 110.9 ML/MIN/1.73
ERYTHROCYTE [DISTWIDTH] IN BLOOD BY AUTOMATED COUNT: 13.2 % (ref 12.3–15.4)
GLUCOSE SERPL-MCNC: 114 MG/DL (ref 65–99)
HCT VFR BLD AUTO: 44 % (ref 34–46.6)
HDLC SERPL-MCNC: 33 MG/DL (ref 40–60)
HGB BLD-MCNC: 15.4 G/DL (ref 12–15.9)
LDLC SERPL CALC-MCNC: 81 MG/DL (ref 0–100)
LDLC/HDLC SERPL: 2.05 {RATIO}
MCH RBC QN AUTO: 29.5 PG (ref 26.6–33)
MCHC RBC AUTO-ENTMCNC: 35 G/DL (ref 31.5–35.7)
MCV RBC AUTO: 84.3 FL (ref 79–97)
MRSA DNA SPEC QL NAA+PROBE: NORMAL
PLATELET # BLD AUTO: 314 10*3/MM3 (ref 140–450)
PMV BLD AUTO: 10.3 FL (ref 6–12)
POTASSIUM SERPL-SCNC: 3.8 MMOL/L (ref 3.5–5.2)
QT INTERVAL: 394 MS
RBC # BLD AUTO: 5.22 10*6/MM3 (ref 3.77–5.28)
SODIUM SERPL-SCNC: 136 MMOL/L (ref 136–145)
TRIGL SERPL-MCNC: 347 MG/DL (ref 0–150)
VLDLC SERPL-MCNC: 56 MG/DL (ref 5–40)
WBC NRBC COR # BLD: 11.97 10*3/MM3 (ref 3.4–10.8)

## 2023-04-17 PROCEDURE — 93005 ELECTROCARDIOGRAM TRACING: CPT | Performed by: PODIATRIST

## 2023-04-17 PROCEDURE — 80048 BASIC METABOLIC PNL TOTAL CA: CPT

## 2023-04-17 PROCEDURE — 80061 LIPID PANEL: CPT

## 2023-04-17 PROCEDURE — 87641 MR-STAPH DNA AMP PROBE: CPT

## 2023-04-17 PROCEDURE — 36415 COLL VENOUS BLD VENIPUNCTURE: CPT

## 2023-04-17 PROCEDURE — 71046 X-RAY EXAM CHEST 2 VIEWS: CPT

## 2023-04-17 PROCEDURE — 85027 COMPLETE CBC AUTOMATED: CPT

## 2023-04-19 ENCOUNTER — TELEPHONE (OUTPATIENT)
Dept: PODIATRY | Facility: CLINIC | Age: 43
End: 2023-04-19
Payer: MEDICAID

## 2023-04-19 DIAGNOSIS — R91.1 PULMONARY NODULE: ICD-10-CM

## 2023-04-19 DIAGNOSIS — R93.89 ABNORMAL CHEST X-RAY: Primary | ICD-10-CM

## 2023-04-19 NOTE — TELEPHONE ENCOUNTER
----- Message from JESSICA Malloy DPM sent at 4/18/2023  2:46 PM EDT -----  Please order CT chest and have patient follow up with PCP prior to surgery.  ----- Message -----  From: Bhavana, Rad Results [place] In  Sent: 4/18/2023   2:24 PM EDT  To: JESSICA Malloy DPM

## 2023-04-19 NOTE — TELEPHONE ENCOUNTER
I SPOKE WITH PCP OFFICE AND THEY WANT  TO ORDER THE CHEST CT AND THEY WILL CLEAR HER FOR SURGERY AFTER. I HAVE SPOKE WITH PATIENT AND I AM WORKING ON PA FOR CT AT [facility]

## 2023-04-19 NOTE — TELEPHONE ENCOUNTER
Per  verbal orders I am to call pcp to see if they will take this request over or do they want us to order?

## 2023-04-20 ENCOUNTER — TELEPHONE (OUTPATIENT)
Dept: PAIN MEDICINE | Facility: CLINIC | Age: 43
End: 2023-04-20
Payer: MEDICAID

## 2023-04-20 ENCOUNTER — TELEPHONE (OUTPATIENT)
Dept: PODIATRY | Facility: CLINIC | Age: 43
End: 2023-04-20
Payer: MEDICAID

## 2023-04-20 DIAGNOSIS — S92.324K: Primary | ICD-10-CM

## 2023-04-20 NOTE — TELEPHONE ENCOUNTER
Patient called very confused about referral for PRE-OP CT, new referral and PA will need to be completed and MA will call patient when completed.

## 2023-04-20 NOTE — TELEPHONE ENCOUNTER
Caller: PATIENT    Relationship to patient: SELF     Best call back number: [phone]    Type of visit: FOLLOW UP    Requested date: ANYTIME BEFORE 05.01.23 ( PATIENT IS OPEN TO GOING TO EITHER OFFICE.)    If rescheduling, when is the original appointment: 4.20.23 AT 1:30 PM    Additional notes: PATIENT CALLED TO CANCEL HER APPT WITH DR. MCCOY TODAY AT 1:30 PM DUE TO HAVING TO GET A CT SCAN DONE ON HER CHEST TODAY AT 1:30 PM. PATIENT IS WANTING TO SEE DR. MCCOY BEFORE HER SURGERY ON 05.01.23 BUT DR. MCCOY IS BOOKED OUT AT BOTH OFFICES UNTIL MID MAY. PATIENT IS WANTING TO KNOW IF DR. MCCOY COULD WORK HER IN AT EITHER LOCATION BEFORE 05.01.23. THANK YOU!

## 2023-04-21 ENCOUNTER — TELEPHONE (OUTPATIENT)
Dept: PODIATRY | Facility: CLINIC | Age: 43
End: 2023-04-21
Payer: MEDICAID

## 2023-04-21 DIAGNOSIS — R93.89 ABNORMAL CHEST X-RAY: Primary | ICD-10-CM

## 2023-04-21 DIAGNOSIS — R91.1 PULMONARY NODULE: ICD-10-CM

## 2023-04-21 NOTE — TELEPHONE ENCOUNTER
Caller: PATIENT    Relationship to patient: SELF     Best call back number: [phone]    Patient is needing: PATIENT WAS CALLING BACK TO SPEAK TO KATHERINE REGARDING HER CHEST CT SCAN. PATIENT STATED SHE SPOKE TO KATHERINE MULTIPLE TIMES YESTERDAY BUT SHE CALLED  SCHEDULING DEPT AND WAS TOLD THERE WAS AN ISSUE WITH HER CT SCAN ORDER. PATIENT WAS CALLING BACK TO TALK TO KATHERINE ABOUT GETTING HER CHEST CT SCAN SENT TO ANOTHER FACILITY. PATIENT IS REQUESTING A CALL BACK ASAP. THANK YOU!

## 2023-04-21 NOTE — TELEPHONE ENCOUNTER
Caller: LAW    Relationship to patient: [facility]    Best call back number: [phone]    Patient is needing: ANY NOTES THAT EXPLAIN WHY PATIENT WOULD NEED A WHEELCHAIR. FAXED  [phone]

## 2023-04-25 ENCOUNTER — HOSPITAL ENCOUNTER (OUTPATIENT)
Dept: CT IMAGING | Facility: HOSPITAL | Age: 43
Discharge: HOME | End: 2023-04-25
Admitting: PODIATRIST
Payer: MEDICAID

## 2023-04-25 DIAGNOSIS — R93.89 ABNORMAL CHEST X-RAY: ICD-10-CM

## 2023-04-25 DIAGNOSIS — R91.1 PULMONARY NODULE: ICD-10-CM

## 2023-04-25 PROCEDURE — 25510000001 IOPAMIDOL PER 1 ML: Performed by: PODIATRIST

## 2023-04-25 PROCEDURE — 71260 CT THORAX DX C+: CPT

## 2023-04-25 RX ADMIN — IOPAMIDOL 100 ML: 755 INJECTION, SOLUTION INTRAVENOUS at 16:28

## 2023-04-26 ENCOUNTER — TREATMENT (OUTPATIENT)
Dept: PHYSICAL THERAPY | Facility: CLINIC | Age: 43
End: 2023-04-26
Payer: MEDICAID

## 2023-04-26 DIAGNOSIS — G90.521 COMPLEX REGIONAL PAIN SYNDROME TYPE 1 OF RIGHT LOWER EXTREMITY: ICD-10-CM

## 2023-04-26 DIAGNOSIS — M19.071 ARTHRITIS OF RIGHT FOOT: Primary | ICD-10-CM

## 2023-04-26 DIAGNOSIS — R26.9 GAIT DISTURBANCE: ICD-10-CM

## 2023-04-26 DIAGNOSIS — S93.324S DISLOCATION OF TARSOMETATARSAL JOINT OF RIGHT FOOT, SEQUELA: ICD-10-CM

## 2023-04-26 DIAGNOSIS — M79.671 RIGHT FOOT PAIN: ICD-10-CM

## 2023-04-26 NOTE — PROGRESS NOTES
Physical Therapy Daily Treatment Note      Patient: Vidya Becerra   : 1980  Diagnosis/ICD-10 Code:  Arthritis of right foot [M19.071]  Referring practitioner: JESSICA Malloy DPM  Date of Initial Visit: Type: THERAPY  Noted: 2023  Today's Date: 2023  Patient seen for 16 sessions         Vidya Becerra reports: she has been having really bad knee pain in the anterior part of her knee over her knee cap. Pt. states she is unsure if this is the injection wearing off or something new. Pt. States she will be having foot surgery on 2023 and she needs to cancel session from that date on until she receives orders from her MD to come back.    Objective   See Exercise, Manual, and Modality Logs for complete treatment.     Assessment/Plan   Pt. Completes exercise well that required knee movement but those exercises requiring R LE single LE weightbearing were held to avoid increase in pain. Pt. Was maine to maintain good form and complete most activity despite knee pain.    Goals  Plan Goals: STG to be met in 3 wk:  - Increase R ankle DF AROM to 8 deg. - Progressing  - PT to assess SLS in 3-4 visits. - MET (22 seconds 3/14/2023)  - Pt to tolerate NuStep x10 min. - Progressing  - Pt to be independent with HEP in 3 weeks. MET  LTG to be met in 12 wk:  - Improve FAAM score to 80% or better ability. Progressing (current 60%)  - Increase R ankle strength to 4+/5 in all directions in order to walk in her yard safely. Progressing  - Pt to negotiate stairs with reciprocal pattern and 1 handrail to negotiate in community. Progressing (contingent pain level)  - Pt to ambulate in clinic without ankle brace with continuous pattern and R heel strike and toe off in order to walk in her home to complete housework.     Progress strengthening /stabilization /functional activity           Timed:           Therapeutic Exercise:    15     mins  07772;     Neuromuscular Annamarie:    10    mins  20147;    Therapeutic Activity:      15     mins  [id number];         Timed Treatment:   40   mins   Total Treatment:     40   mins    Xiomara Osborne PTA  Physical Therapist Assistant License #[id number]

## 2023-04-27 ENCOUNTER — OFFICE VISIT (OUTPATIENT)
Dept: PAIN MEDICINE | Facility: CLINIC | Age: 43
End: 2023-04-27
Payer: MEDICAID

## 2023-04-27 VITALS
RESPIRATION RATE: 16 BRPM | HEART RATE: 89 BPM | DIASTOLIC BLOOD PRESSURE: 92 MMHG | SYSTOLIC BLOOD PRESSURE: 138 MMHG | OXYGEN SATURATION: 97 %

## 2023-04-27 DIAGNOSIS — G89.18 PAIN AT SURGICAL SITE: ICD-10-CM

## 2023-04-27 DIAGNOSIS — Z79.899 HIGH RISK MEDICATION USE: Primary | ICD-10-CM

## 2023-04-27 DIAGNOSIS — G90.521 COMPLEX REGIONAL PAIN SYNDROME TYPE 1 OF RIGHT LOWER EXTREMITY: ICD-10-CM

## 2023-04-27 PROCEDURE — G0463 HOSPITAL OUTPT CLINIC VISIT: HCPCS | Performed by: STUDENT IN AN ORGANIZED HEALTH CARE EDUCATION/TRAINING PROGRAM

## 2023-04-27 RX ORDER — GABAPENTIN 800 MG/1
800 TABLET ORAL 3 TIMES DAILY
Qty: 90 TABLET | Refills: 2 | Status: SHIPPED | OUTPATIENT
Start: 2023-04-27

## 2023-04-27 RX ORDER — OXYCODONE HYDROCHLORIDE AND ACETAMINOPHEN 5; 325 MG/1; MG/1
1 TABLET ORAL EVERY 8 HOURS PRN
Qty: 90 TABLET | Refills: 0 | Status: SHIPPED | OUTPATIENT
Start: 2023-04-27

## 2023-04-27 RX ORDER — GABAPENTIN 800 MG/1
800 TABLET ORAL 3 TIMES DAILY
Qty: 90 TABLET | Refills: 2 | Status: SHIPPED | OUTPATIENT
Start: 2023-04-27 | End: 2023-04-27 | Stop reason: SDUPTHER

## 2023-04-27 NOTE — TELEPHONE ENCOUNTER
Caller: PATIENT    Relationship to patient: SELF     Best call back number: [phone]    Patient is needing: PATIENT WAS TOLD BY HER PHARMACY, [facility] IN [place] THAT A PRIOR AUTHORIZATION IS NEEDED FOR HER PRESCRIPTION OF OXYCODONE. [facility] ADVISED THE PATIENT TO CONTACT YOUR OFFICE TO LET YOU KNOW THEY ARE SENDING THE INFORMATION OVER TO YOUR OFFICE. THANK YOU!

## 2023-04-27 NOTE — PROGRESS NOTES
CHIEF COMPLAINT  Chief Complaint   Patient presents with   • Pain     F/u from Lumbar Sympathetic Block, right, with fluoroscopy  CC  right foot and right ankle pain  No Narcotics        Primary Care  Ana Rosa Doran APRN Subjective   Vidya Becerra is a 43 y.o. female  who presents for right foot and right ankle pain.  She states that she has had multiple surgeries on the right foot and right ankle and has developed what appears to be CRPS of the right lower extremity.  She describes pain especially below the knee into the right foot.  She also describes occasional swelling as well as decreased hair growth and components of allodynia in the right foot and right ankle.  She also has some occasional numbness and tingling as well as decreased strength and decreased capillary refill in the right lower extremity.  She has been tried on several medications in the past and has not gotten any significant benefit.  She is also been evaluated by podiatry without any surgical interventions at this time.    Pain  Associated symptoms include arthralgias, myalgias, numbness and weakness. Pertinent negatives include no joint swelling.        Location: Right ankle and right foot  Onset: Years ago  Duration: Slowly worsening  Timing: Constant throughout the day  Quality: Sharp, stabbing, burning  Severity: Today: 8       Last Week: 8       Worst: 10  Modifying Factors: The pain is worse with movement and physical activity walking and slightly improved with rest  Functional Deficit: The pain makes it difficult for her to work and perform her activities of daily living    Physical Therapy: yes    Interval Update 04/27/2023: She reports 100% benefit for 1 day following the lumbar so without blockade with a return of symptoms as before.  No significant change with Lyrica.      The following portions of the patient's history were reviewed and updated as appropriate: allergies, current medications, past family history, past medical  history, past social history, past surgical history and problem list.    Procedures:  1. 3/27/2023: Right-sided lumbar synthetic nerve block: 100% relief for 24 hours        Current Outpatient Medications:   •  albuterol sulfate  (90 Base) MCG/ACT inhaler, Inhale 1-2 puffs As Needed. With bronchitis   use preop if needed bring with, Disp: , Rfl:   •  aspirin 81 MG EC tablet, Take 1 tablet by mouth Daily. (Patient taking differently: Take 1 tablet by mouth Daily. Ld 4/24), Disp: 30 tablet, Rfl: 0  •  atorvastatin (LIPITOR) 40 MG tablet, TAKE 1 TABLET BY MOUTH EVERY DAY AT NIGHT (Patient taking differently: Take 1 tablet by mouth Every Night.), Disp: 90 tablet, Rfl: 3  •  baclofen (LIORESAL) 10 MG tablet, Take 1 tablet by mouth At Night As Needed., Disp: , Rfl:   •  escitalopram (LEXAPRO) 10 MG tablet, Take 1 tablet by mouth Daily. Take preop, Disp: , Rfl:   •  lisinopril-hydrochlorothiazide (PRINZIDE,ZESTORETIC) 20-12.5 MG per tablet, TAKE 1 TABLET BY MOUTH EVERY DAY (Patient taking differently: Take 1 tablet by mouth Daily. Last dose by 1100 4/30), Disp: 30 tablet, Rfl: 5  •  meloxicam (MOBIC) 15 MG tablet, Take 1 tablet by mouth Daily. Ld 4/24, Disp: , Rfl:   •  pantoprazole (PROTONIX) 40 MG EC tablet, Take 1 tablet by mouth Every Night., Disp: , Rfl:   •  promethazine-dextromethorphan (PROMETHAZINE-DM) 6.25-15 MG/5ML syrup, TAKE 5 ML BY MOUTH EVERY FOUR HOURS AS NEEDED FOR COUGH, DO NOT DRIVE WHILE ON MED DUE TO DROWSINESS, Disp: , Rfl:   •  gabapentin (NEURONTIN) 800 MG tablet, Take 1 tablet by mouth 3 (Three) Times a Day. Take preop, Disp: 90 tablet, Rfl: 2  •  oxyCODONE-acetaminophen (Percocet) 5-325 MG per tablet, Take 1 tablet by mouth Every 8 (Eight) Hours As Needed for Severe Pain., Disp: 90 tablet, Rfl: 0    Review of Systems   Musculoskeletal: Positive for arthralgias, back pain, gait problem and myalgias. Negative for joint swelling.   Neurological: Positive for weakness and numbness.        Vitals:    04/27/23 1319   BP: 138/92   Pulse: 89   Resp: 16   SpO2: 97%   PainSc: 7  Comment: activity 10       Urine Drug Screen: 4/27/2023  Appropriate: Pending    Objective   Physical Exam  Vitals and nursing note reviewed. Exam conducted with a chaperone present.   Constitutional:       General: She is not in acute distress.     Appearance: Normal appearance. She is normal weight.   Musculoskeletal:      Comments: Right foot is tender to palpation especially below the knee.  She also has decreased capillary refill in the right lower extremity compared to the left.  She shows decreased hair growth as well as some component of swelling compared to the left foot.  She is also losing some muscle mass and muscle strength in the right foot       Neurological:      Mental Status: She is alert.      Sensory: Sensory deficit present.      Motor: Weakness present.           Assessment & Plan   Problems Addressed this Visit    None  Visit Diagnoses     High risk medication use    -  Primary    Relevant Orders    Urine Drug Screen - Urine, Clean Catch    Complex regional pain syndrome type 1 of right lower extremity        Relevant Medications    oxyCODONE-acetaminophen (Percocet) 5-325 MG per tablet    Pain at surgical site          Diagnoses       Codes Comments    High risk medication use    -  Primary ICD-10-CM: Z79.899  ICD-9-CM: V58.69     Complex regional pain syndrome type 1 of right lower extremity     ICD-10-CM: G90.521  ICD-9-CM: 337.22     Pain at surgical site     ICD-10-CM: G89.18  ICD-9-CM: 338.18           Plan:  1. Ideally, she would be a candidate for spinal cord stimulation given the persistent CRPS type I symptoms however she has Medicaid which does not cover the procedure  2. Go back to gabapentin as she did not get benefit with Lyrica  3. No lasting benefit with lumbar sympathetic  4. We will also try Celebrex for ankle and foot pain  5. She cannot tolerate hydrocodone.  We will start Percocet 5  mg 3 times daily  6. UDS and contract today  --- Follow-up 1 month           INSPECT REPORT    As part of the patient's treatment plan, I may be prescribing controlled substances. The patient has been made aware of appropriate use of such medications, including potential risk of somnolence, limited ability to drive and/or work safely, and the potential for dependence or overdose. It has also bee made clear that these medications are for use by this patient only, without concomitant use of alcohol or other substances unless prescribed.     Patient has completed prescribing agreement detailing terms of continued prescribing of controlled substances, including monitoring CRISSY reports, urine drug screening, and pill counts if necessary. The patient is aware that inappropriate use will results in cessation of prescribing such medications.    INSPECT report has been reviewed and scanned into the patient's chart.    As the clinician, I personally reviewed the INSPECT from 4/26/2023.    History and physical exam exhibit continued safe and appropriate use of controlled substances.      EMR Dragon/Transcription disclaimer:   Much of this encounter note is an electronic transcription/translation of spoken language to printed text. The electronic translation of spoken language may permit erroneous, or at times, nonsensical words or phrases to be inadvertently transcribed; Although I have reviewed the note for such errors, some may still exist.

## 2023-04-27 NOTE — TELEPHONE ENCOUNTER
Rx Refill Note  Requested Prescriptions     Pending Prescriptions Disp Refills   • gabapentin (NEURONTIN) 800 MG tablet 90 tablet 2     Sig: Take 1 tablet by mouth 3 (Three) Times a Day. Take preop      Last office visit with prescribing clinician: 4/27/2023   Last telemedicine visit with prescribing clinician: Visit date not found   Next office visit with prescribing clinician: 5/25/2023                         Would you like a call back once the refill request has been completed: [] Yes [] No    If the office needs to give you a call back, can they leave a voicemail: [] Yes [] No    Kalani Malik MA  04/27/23, 14:00 EDT

## 2023-04-28 ENCOUNTER — ANESTHESIA EVENT (OUTPATIENT)
Dept: PERIOP | Facility: HOSPITAL | Age: 43
End: 2023-04-28
Payer: MEDICAID

## 2023-05-01 ENCOUNTER — APPOINTMENT (OUTPATIENT)
Dept: GENERAL RADIOLOGY | Facility: HOSPITAL | Age: 43
End: 2023-05-01
Payer: MEDICAID

## 2023-05-01 ENCOUNTER — HOSPITAL ENCOUNTER (OUTPATIENT)
Facility: HOSPITAL | Age: 43
Setting detail: HOSPITAL OUTPATIENT SURGERY
Discharge: HOME | End: 2023-05-01
Attending: PODIATRIST | Admitting: PODIATRIST
Payer: MEDICAID

## 2023-05-01 ENCOUNTER — ANESTHESIA (OUTPATIENT)
Dept: PERIOP | Facility: HOSPITAL | Age: 43
End: 2023-05-01
Payer: MEDICAID

## 2023-05-01 VITALS
HEART RATE: 91 BPM | TEMPERATURE: 97.9 F | DIASTOLIC BLOOD PRESSURE: 67 MMHG | BODY MASS INDEX: 36.54 KG/M2 | HEIGHT: 64 IN | RESPIRATION RATE: 18 BRPM | WEIGHT: 214 LBS | OXYGEN SATURATION: 98 % | SYSTOLIC BLOOD PRESSURE: 118 MMHG

## 2023-05-01 DIAGNOSIS — M19.071 ARTHRITIS OF RIGHT FOOT: ICD-10-CM

## 2023-05-01 DIAGNOSIS — S92.324K: ICD-10-CM

## 2023-05-01 DIAGNOSIS — Z96.9 PRESENCE OF RETAINED HARDWARE: ICD-10-CM

## 2023-05-01 PROCEDURE — 25010000002 CEFAZOLIN PER 500 MG: Performed by: PODIATRIST

## 2023-05-01 PROCEDURE — 76000 FLUOROSCOPY <1 HR PHYS/QHP: CPT

## 2023-05-01 PROCEDURE — C1713 ANCHOR/SCREW BN/BN,TIS/BN: HCPCS | Performed by: PODIATRIST

## 2023-05-01 PROCEDURE — 25010000002 PROPOFOL 200 MG/20ML EMULSION

## 2023-05-01 PROCEDURE — 73620 X-RAY EXAM OF FOOT: CPT

## 2023-05-01 PROCEDURE — 25010000002 DEXAMETHASONE PER 1 MG: Performed by: ANESTHESIOLOGY

## 2023-05-01 PROCEDURE — 25010000002 ROPIVACAINE PER 1 MG: Performed by: ANESTHESIOLOGY

## 2023-05-01 PROCEDURE — 25010000002 HYDROMORPHONE 1 MG/ML SOLUTION

## 2023-05-01 PROCEDURE — 25010000002 FENTANYL CITRATE (PF) 50 MCG/ML SOLUTION: Performed by: ANESTHESIOLOGY

## 2023-05-01 PROCEDURE — 25010000002 MIDAZOLAM PER 1 MG: Performed by: ANESTHESIOLOGY

## 2023-05-01 DEVICE — IMPLANTABLE DEVICE
Type: IMPLANTABLE DEVICE | Site: FOOT | Status: FUNCTIONAL
Brand: ORTHOLOC 3DI

## 2023-05-01 DEVICE — IMPLANTABLE DEVICE
Type: IMPLANTABLE DEVICE | Site: FOOT | Status: FUNCTIONAL
Brand: EASYFUSE™

## 2023-05-01 DEVICE — IMPLANTABLE DEVICE
Type: IMPLANTABLE DEVICE | Site: FOOT | Status: FUNCTIONAL
Brand: DARCO

## 2023-05-01 DEVICE — IMPLANTABLE DEVICE
Type: IMPLANTABLE DEVICE | Site: FOOT | Status: FUNCTIONAL
Brand: ORTHOLOC

## 2023-05-01 DEVICE — POWER MIX
Type: IMPLANTABLE DEVICE | Site: FOOT | Status: FUNCTIONAL
Brand: MINI IGNITE 2.0

## 2023-05-01 DEVICE — ALLOGRFT FIBR OSTEOAMP SELECT 5CC: Type: IMPLANTABLE DEVICE | Site: FOOT | Status: FUNCTIONAL

## 2023-05-01 RX ORDER — GABAPENTIN 400 MG/1
800 CAPSULE ORAL ONCE
Status: DISCONTINUED | OUTPATIENT
Start: 2023-05-01 | End: 2023-05-01 | Stop reason: HOSPADM

## 2023-05-01 RX ORDER — SODIUM CHLORIDE 0.9 % (FLUSH) 0.9 %
10 SYRINGE (ML) INJECTION AS NEEDED
Status: DISCONTINUED | OUTPATIENT
Start: 2023-05-01 | End: 2023-05-01 | Stop reason: HOSPADM

## 2023-05-01 RX ORDER — LIDOCAINE HYDROCHLORIDE 20 MG/ML
INJECTION, SOLUTION EPIDURAL; INFILTRATION; INTRACAUDAL; PERINEURAL AS NEEDED
Status: DISCONTINUED | OUTPATIENT
Start: 2023-05-01 | End: 2023-05-01 | Stop reason: SURG

## 2023-05-01 RX ORDER — FLUMAZENIL 0.1 MG/ML
0.1 INJECTION INTRAVENOUS AS NEEDED
Status: DISCONTINUED | OUTPATIENT
Start: 2023-05-01 | End: 2023-05-01 | Stop reason: HOSPADM

## 2023-05-01 RX ORDER — MIDAZOLAM HYDROCHLORIDE 1 MG/ML
INJECTION INTRAMUSCULAR; INTRAVENOUS
Status: COMPLETED | OUTPATIENT
Start: 2023-05-01 | End: 2023-05-01

## 2023-05-01 RX ORDER — PROCHLORPERAZINE EDISYLATE 5 MG/ML
10 INJECTION INTRAMUSCULAR; INTRAVENOUS ONCE AS NEEDED
Status: DISCONTINUED | OUTPATIENT
Start: 2023-05-01 | End: 2023-05-01 | Stop reason: HOSPADM

## 2023-05-01 RX ORDER — ONDANSETRON 2 MG/ML
4 INJECTION INTRAMUSCULAR; INTRAVENOUS ONCE AS NEEDED
Status: DISCONTINUED | OUTPATIENT
Start: 2023-05-01 | End: 2023-05-01 | Stop reason: HOSPADM

## 2023-05-01 RX ORDER — SODIUM CHLORIDE, SODIUM LACTATE, POTASSIUM CHLORIDE, CALCIUM CHLORIDE 600; 310; 30; 20 MG/100ML; MG/100ML; MG/100ML; MG/100ML
1000 INJECTION, SOLUTION INTRAVENOUS CONTINUOUS
Status: DISCONTINUED | OUTPATIENT
Start: 2023-05-01 | End: 2023-05-01 | Stop reason: HOSPADM

## 2023-05-01 RX ORDER — HYDRALAZINE HYDROCHLORIDE 20 MG/ML
5 INJECTION INTRAMUSCULAR; INTRAVENOUS
Status: DISCONTINUED | OUTPATIENT
Start: 2023-05-01 | End: 2023-05-01 | Stop reason: HOSPADM

## 2023-05-01 RX ORDER — IPRATROPIUM BROMIDE AND ALBUTEROL SULFATE 2.5; .5 MG/3ML; MG/3ML
3 SOLUTION RESPIRATORY (INHALATION) ONCE AS NEEDED
Status: DISCONTINUED | OUTPATIENT
Start: 2023-05-01 | End: 2023-05-01 | Stop reason: HOSPADM

## 2023-05-01 RX ORDER — ROPIVACAINE HYDROCHLORIDE 5 MG/ML
INJECTION, SOLUTION EPIDURAL; INFILTRATION; PERINEURAL
Status: COMPLETED | OUTPATIENT
Start: 2023-05-01 | End: 2023-05-01

## 2023-05-01 RX ORDER — LABETALOL HYDROCHLORIDE 5 MG/ML
5 INJECTION, SOLUTION INTRAVENOUS
Status: DISCONTINUED | OUTPATIENT
Start: 2023-05-01 | End: 2023-05-01 | Stop reason: HOSPADM

## 2023-05-01 RX ORDER — DIPHENHYDRAMINE HYDROCHLORIDE 50 MG/ML
12.5 INJECTION INTRAMUSCULAR; INTRAVENOUS ONCE AS NEEDED
Status: DISCONTINUED | OUTPATIENT
Start: 2023-05-01 | End: 2023-05-01 | Stop reason: HOSPADM

## 2023-05-01 RX ORDER — KETAMINE HCL IN NACL, ISO-OSM 100MG/10ML
SYRINGE (ML) INJECTION AS NEEDED
Status: DISCONTINUED | OUTPATIENT
Start: 2023-05-01 | End: 2023-05-01 | Stop reason: SURG

## 2023-05-01 RX ORDER — NALOXONE HCL 0.4 MG/ML
0.4 VIAL (ML) INJECTION AS NEEDED
Status: DISCONTINUED | OUTPATIENT
Start: 2023-05-01 | End: 2023-05-01 | Stop reason: HOSPADM

## 2023-05-01 RX ORDER — DEXAMETHASONE SODIUM PHOSPHATE 4 MG/ML
INJECTION, SOLUTION INTRA-ARTICULAR; INTRALESIONAL; INTRAMUSCULAR; INTRAVENOUS; SOFT TISSUE
Status: COMPLETED | OUTPATIENT
Start: 2023-05-01 | End: 2023-05-01

## 2023-05-01 RX ORDER — OXYCODONE HYDROCHLORIDE 5 MG/1
5 TABLET ORAL ONCE
Status: COMPLETED | OUTPATIENT
Start: 2023-05-01 | End: 2023-05-01

## 2023-05-01 RX ORDER — FENTANYL CITRATE 50 UG/ML
25 INJECTION, SOLUTION INTRAMUSCULAR; INTRAVENOUS
Status: DISCONTINUED | OUTPATIENT
Start: 2023-05-01 | End: 2023-05-01 | Stop reason: HOSPADM

## 2023-05-01 RX ORDER — LIDOCAINE HYDROCHLORIDE 10 MG/ML
0.5 INJECTION, SOLUTION INFILTRATION; PERINEURAL ONCE AS NEEDED
Status: DISCONTINUED | OUTPATIENT
Start: 2023-05-01 | End: 2023-05-01 | Stop reason: HOSPADM

## 2023-05-01 RX ORDER — DEXMEDETOMIDINE HYDROCHLORIDE 100 UG/ML
INJECTION, SOLUTION INTRAVENOUS AS NEEDED
Status: DISCONTINUED | OUTPATIENT
Start: 2023-05-01 | End: 2023-05-01 | Stop reason: SURG

## 2023-05-01 RX ORDER — FENTANYL CITRATE 50 UG/ML
INJECTION, SOLUTION INTRAMUSCULAR; INTRAVENOUS
Status: COMPLETED | OUTPATIENT
Start: 2023-05-01 | End: 2023-05-01

## 2023-05-01 RX ORDER — PROPOFOL 10 MG/ML
INJECTION, EMULSION INTRAVENOUS AS NEEDED
Status: DISCONTINUED | OUTPATIENT
Start: 2023-05-01 | End: 2023-05-01 | Stop reason: SURG

## 2023-05-01 RX ORDER — FENTANYL CITRATE 50 UG/ML
50 INJECTION, SOLUTION INTRAMUSCULAR; INTRAVENOUS
Status: DISCONTINUED | OUTPATIENT
Start: 2023-05-01 | End: 2023-05-01 | Stop reason: HOSPADM

## 2023-05-01 RX ORDER — HYDROCODONE BITARTRATE AND ACETAMINOPHEN 7.5; 325 MG/1; MG/1
1 TABLET ORAL ONCE AS NEEDED
Status: DISCONTINUED | OUTPATIENT
Start: 2023-05-01 | End: 2023-05-01 | Stop reason: HOSPADM

## 2023-05-01 RX ADMIN — DEXMEDETOMIDINE HYDROCHLORIDE 5 MCG: 100 INJECTION, SOLUTION INTRAVENOUS at 14:15

## 2023-05-01 RX ADMIN — FENTANYL CITRATE 100 MCG: 50 INJECTION, SOLUTION INTRAMUSCULAR; INTRAVENOUS at 11:20

## 2023-05-01 RX ADMIN — DEXMEDETOMIDINE HYDROCHLORIDE 4 MCG: 100 INJECTION, SOLUTION INTRAVENOUS at 14:01

## 2023-05-01 RX ADMIN — DEXMEDETOMIDINE HYDROCHLORIDE 4 MCG: 100 INJECTION, SOLUTION INTRAVENOUS at 13:56

## 2023-05-01 RX ADMIN — FENTANYL CITRATE 50 MCG: 50 INJECTION, SOLUTION INTRAMUSCULAR; INTRAVENOUS at 14:10

## 2023-05-01 RX ADMIN — LIDOCAINE HYDROCHLORIDE 100 MG: 20 INJECTION, SOLUTION EPIDURAL; INFILTRATION; INTRACAUDAL; PERINEURAL at 12:10

## 2023-05-01 RX ADMIN — DEXAMETHASONE SODIUM PHOSPHATE 4 MG: 4 INJECTION, SOLUTION INTRAMUSCULAR; INTRAVENOUS at 11:20

## 2023-05-01 RX ADMIN — FENTANYL CITRATE 50 MCG: 50 INJECTION, SOLUTION INTRAMUSCULAR; INTRAVENOUS at 13:26

## 2023-05-01 RX ADMIN — DEXMEDETOMIDINE HYDROCHLORIDE 4 MCG: 100 INJECTION, SOLUTION INTRAVENOUS at 13:40

## 2023-05-01 RX ADMIN — PROPOFOL 150 MG: 10 INJECTION, EMULSION INTRAVENOUS at 12:10

## 2023-05-01 RX ADMIN — FENTANYL CITRATE 50 MCG: 50 INJECTION, SOLUTION INTRAMUSCULAR; INTRAVENOUS at 14:14

## 2023-05-01 RX ADMIN — ROPIVACAINE HYDROCHLORIDE 30 ML: 5 INJECTION EPIDURAL; INFILTRATION; PERINEURAL at 11:20

## 2023-05-01 RX ADMIN — HYDROMORPHONE HYDROCHLORIDE 0.5 MG: 1 INJECTION, SOLUTION INTRAMUSCULAR; INTRAVENOUS; SUBCUTANEOUS at 13:45

## 2023-05-01 RX ADMIN — FENTANYL CITRATE 50 MCG: 50 INJECTION, SOLUTION INTRAMUSCULAR; INTRAVENOUS at 13:38

## 2023-05-01 RX ADMIN — SODIUM CHLORIDE, SODIUM LACTATE, POTASSIUM CHLORIDE, AND CALCIUM CHLORIDE: .6; .31; .03; .02 INJECTION, SOLUTION INTRAVENOUS at 11:10

## 2023-05-01 RX ADMIN — DEXMEDETOMIDINE HYDROCHLORIDE 4 MCG: 100 INJECTION, SOLUTION INTRAVENOUS at 13:51

## 2023-05-01 RX ADMIN — OXYCODONE HYDROCHLORIDE 5 MG: 5 TABLET ORAL at 11:23

## 2023-05-01 RX ADMIN — MIDAZOLAM 2 MG: 1 INJECTION INTRAMUSCULAR; INTRAVENOUS at 11:20

## 2023-05-01 RX ADMIN — Medication 30 MG: at 13:41

## 2023-05-01 RX ADMIN — CEFAZOLIN 2 G: 2 INJECTION, POWDER, FOR SOLUTION INTRAMUSCULAR; INTRAVENOUS at 12:13

## 2023-05-01 RX ADMIN — HYDROMORPHONE HYDROCHLORIDE 0.5 MG: 1 INJECTION, SOLUTION INTRAMUSCULAR; INTRAVENOUS; SUBCUTANEOUS at 13:32

## 2023-05-01 RX ADMIN — DEXAMETHASONE SODIUM PHOSPHATE 4 MG: 4 INJECTION, SOLUTION INTRAMUSCULAR; INTRAVENOUS at 12:13

## 2023-05-01 RX ADMIN — Medication 20 MG: at 13:32

## 2023-05-01 RX ADMIN — DEXMEDETOMIDINE HYDROCHLORIDE 5 MCG: 100 INJECTION, SOLUTION INTRAVENOUS at 14:20

## 2023-05-01 RX ADMIN — DEXMEDETOMIDINE HYDROCHLORIDE 4 MCG: 100 INJECTION, SOLUTION INTRAVENOUS at 13:44

## 2023-05-01 NOTE — OP NOTE
Operative Note   Foot and Ankle Surgery   Provider: Dr. Domenic Malloy   Location: [facility]      Procedure:  1.  Hardware removal, right foot  2.  Calcaneal bone marrow harvest, right foot  3.  Open midfoot reduction and internal fixation, right  4.  First tarsometatarsal joint arthrodesis, right  5.  Second tarsometatarsal joint arthrodesis, right  6.  Third tarsometatarsal joint arthrodesis, right    Pre-operative Diagnosis:   1.  Chronic right foot pain   2.  Lisfranc disruption, sequela, right  3.  Retained hardware, right  4.  Acquired foot deformity, right  5.  Posttraumatic arthritis, right foot   6.  Complex regional pain syndrome, right    Post-operative Diagnosis: Same    Surgeon: Domenic Malloy    Assistant: Jeff Machado PGY-3    Anesthesia: General with block    Implants: [employer] first tarsometatarsal joint arthrodesis plate and 4-hole plate secured with locking and nonlocking 3.5 millimeter screws; 4.0 mm cannulated screw; compression staple; [employer] BMA/DBM; Osteoamp 5cc    Findings: Significant dislocation involving the midfoot with prominent posttraumatic arthritis.  Hardware is loose but does not have any concerning features of infection or further concern.    Specimen: None    Blood Loss: Less than 5cc    Complications: None    Post Op Plan: Discharge home.  Strict nonweightbearing activity.  Follow-up with me in 2 weeks    Summary:    Patient is a 43-year-old female who has been seen in office for chronic right foot pain.  Patient has had multiple surgeries in the past regarding this issue.  She sustained an injury years ago to the midfoot.  Imaging in office shows a compression staple and screw with cj instability and disruption involving the Lisfranc ligament region.  Patient has also had previous surgeries involving the lateral aspect of her foot.  She appears to have signs and symptoms consistent with complex regional pain syndrome.  She does see pain management  for these and other issues.  She has tried multiple conservative care options with no significant improvement.  She feels that she is walking on a ball to the middle of her foot with every step.  I did explain potential treatment options with her that would require hardware removal with first, second, and third tarsometatarsal joint arthrodesis.  I did explain that she will require extensive nonweightbearing after the surgery.  Given her current diagnosis of CRPS I am concerned that increased pain or continued pain may be a risk after surgery.  We also reviewed concerns of delayed and nonunion.  Patient understands her risks and states that she needs to do something as she cannot live in her current situation with the foot.    Procedure, risks, complications, and goals were discussed with the patient at bedside.  Risks include but are not limited to infection, complications from anesthesia (including death), chronic pain or numbness, hematoma/seroma, deep vein thrombosis, wound complications, and potential for additional surgical procedures.  Patient understands and elects to proceed with surgery at this time. Informed consent was obtained before proceeding to the operating suite.  All questions were answered to the patient's satisfaction. No guarantees or assurances were given or implied.    Procedure:    Patient was brought to the operating room placed on the operative table in the supine position.  Once adequate general anesthesia was administered, a pneumatic tourniquet was placed about the patient's right thigh.  The right lower extremity was scrubbed prepped and draped in usual sterile fashion.  A formal timeout was conducted prior skin incision    Attention was directed to the lateral aspect of the heel prior to inflating the tourniquet.  The safe zone of the lateral cortex of the calcaneus was identified and a stab incision was performed.  The trocar to the mini [employer] system was introduced into the  calcaneus and approximately 4 cc of bone marrow aspirate was obtained.  The instrumentation was removed from the calcaneus and hemostasis was applied while the limb was elevated and exsanguinated.  The pneumatic tourniquet was inflated to 300 mmHg.    A curvilinear incision was performed over the second interspace at the level of the previous surgical incisions and scar tissue.  Incision was performed through the skin through the fibrotic tissue and subcutaneous tissue to the level of the bone.  Vital structures were identified and preserved throughout the duration of the case.  Meticulous dissection was performed on the level of bone until hardware was identified.  The staple was identified initially and removed in its entirety without complication.  Lateral dissection at the level of the third interspace was performed finding the isolated screw.  The screw was noted to be quite loose and removed without issue.  The second and third tarsometatarsal joints were evaluated and all fibrotic tissue and heterotopic bone formation was removed giving adequate visualization to the joints.  Given the visualization, the decision was made to go ahead and proceed with joint prep.  This was performed with a sagittal saw.  The remaining articular cartilage was removed with flat cuts to the base of the metatarsal and cuneiform to both the second and third TMTJ regions.  The wound was flushed with copious amounts of normal saline.  Subchondral drilling was performed with a 2.0 mm drill bit to all aspects of the joints.    A separate incision was performed to the dorsal medial aspect of the first tarsometatarsal joint region.  Dissection was performed to the level of the joint without complication.  No grossly abnormal features were noted to this region outside of mild inflammation.  Again joint prep was performed in the same manner as discussed above.  A significant amount of mobility was noted to the midfoot at this time allowing  for improved reduction and fixation.  Given the amount of bone loss, structural allograft was needed.  Osteoamp demineralized bone fiber was used and added with the BMA/DBM mixture and applied to the first, second, and third joint spaces.  Initially, the first tarsometatarsal joint was fixated with a contoured plate to the dorsal medial aspect of the joint.  The plate was secured to bone utilizing locking and nonlocking 3.5 millimeter screws of various lengths.  Fixation was performed with fluoroscopy.  Once adequate fixation across the first TMT was obtained, attention was placed to the Lisfranc ligament region.    A large tenaculum was placed across the medial cuneiform and second metatarsal base allowing for full reduction of the Lisfranc ligament region.  A guidewire was placed from a lateral to medial orientation.  Definitive fixation was achieved utilizing a 4.0 mm cannulated screw from a lateral to medial orientation.  The tenaculum was removed showing excellent reduction of the Lisfranc ligament region.  Next, the second tarsometatarsal joint arthrodesis was performed under fluoroscopy with a straight 4-hole plate.  The plate was secured with locking and nonlocking 3.5 millimeter screws.  Finally, the third tarsometatarsal joint was fused with a 20 mm compression staple once adequately reduced.  Final images were performed showing excellent reduction and internal fixation.  Open areas at the arthrodesis sites were backfilled with the allograft.  Irrigation was performed to the medial and dorsal wounds with normal saline.  Deep structures were closed with a 2-0 Vicryl in a simple interrupted manner.  The subcutaneous tissues were closed with a 4-0 Vicryl and the skin was repaired with 3-0 nylon suture.  Incisions were dressed with Xeroform and sterile compressive dressings.  The tourniquet was released and a prompt hyperemic response was noted to all digits of the right lower extremity.  The limb was placed  into a well-padded posterior splint.  Patient tolerated the procedure and anesthesia well.  She was transferred from the operating room to the recovery room with vital signs stable and neurovascular status unchanged to the right lower extremity.      Dr. Domenic Malloy DPM  [facility]  [phone]    Note is dictated utilizing voice recognition software. Unfortunately this leads to occasional typographical errors. I apologize in advance if the situation occurs. If questions occur please do not hesitate to call our office.

## 2023-05-01 NOTE — ANESTHESIA PROCEDURE NOTES
Airway  Urgency: elective    Date/Time: 5/1/2023 12:11 PM  End Time:5/1/2023 12:11 PM  Airway not difficult    General Information and Staff    Patient location during procedure: OR  Anesthesiologist: Eliz Martínez MD  CRNA/CAA: Alex Jimenez CAA    Indications and Patient Condition  Indications for airway management: airway protection and CNS depression    Preoxygenated: yes  MILS maintained throughout  Mask difficulty assessment: 0 - not attempted    Final Airway Details  Final airway type: supraglottic airway      Successful airway: LMA and unique  Size 4     Number of attempts at approach: 1  Assessment: lips, teeth, and gum same as pre-op

## 2023-05-01 NOTE — ANESTHESIA POSTPROCEDURE EVALUATION
Patient: Vidya Becerra    Procedure Summary     Date: 05/01/23 Room / Location: [facility] OR 06 / [facility] MAIN OR    Anesthesia Start: 1205 Anesthesia Stop: 1457    Procedures:       FOOT ARTHRODESIS -first, second, and third tarsometatarsal joints, and midfoot reduction with calcaneal autrograft harvest (Right: Foot)      HARDWARE REMOVAL (Right) Diagnosis:       Nondisplaced fracture of second metatarsal bone, right foot, subsequent encounter for fracture with nonunion      Arthritis of right foot      Presence of retained hardware      (Nondisplaced fracture of second metatarsal bone, right foot, subsequent encounter for fracture with nonunion [S92.324K])      (Arthritis of right foot [M19.071])      (Presence of retained hardware [Z96.9])    Surgeons: JESSICA Malloy DPM Provider: Eliz Martínez MD    Anesthesia Type: general with block ASA Status: 3          Anesthesia Type: general with block    Vitals  Vitals Value Taken Time   /61 05/01/23 1531   Temp 97.4 °F (36.3 °C) 05/01/23 1500   Pulse 75 05/01/23 1531   Resp 20 05/01/23 1515   SpO2 97 % 05/01/23 1531   Vitals shown include unvalidated device data.        Post Anesthesia Care and Evaluation    Patient location during evaluation: PACU  Patient participation: complete - patient participated  Level of consciousness: awake and alert  Pain management: satisfactory to patient    Airway patency: patent  Anesthetic complications: No anesthetic complications  PONV Status: none  Cardiovascular status: acceptable  Respiratory status: acceptable  Hydration status: acceptable

## 2023-05-01 NOTE — ANESTHESIA PROCEDURE NOTES
Peripheral Block      Patient reassessed immediately prior to procedure    Patient location during procedure: pre-op  Start time: 5/1/2023 11:20 AM  Stop time: 5/1/2023 11:28 AM  Reason for block: at surgeon's request  Performed by  Anesthesiologist: Eliz Martínez MD  Preanesthetic Checklist  Completed: patient identified, IV checked, site marked, risks and benefits discussed, surgical consent, monitors and equipment checked, pre-op evaluation and timeout performed  Prep:  Pt Position: supine  Sterile barriers:cap, gloves and sterile barriers  Prep: ChloraPrep  Patient monitoring: blood pressure monitoring, continuous pulse oximetry and EKG  Procedure    Sedation: yes  Performed under: local infiltration  Guidance:ultrasound guided    ULTRASOUND INTERPRETATION.  Using ultrasound guidance a 20 G gauge needle was placed in close proximity to the nerve, at which point, under ultrasound guidance anesthetic was injected in the area of the nerve and spread of the anesthesia was seen on ultrasound in close proximity thereto.  There were no abnormalities seen on ultrasound; a digital image was taken; and the patient tolerated the procedure with no complications. Images:still images obtained, printed/placed on chart    Laterality:right  Block Type:popliteal  Injection Technique:single-shot  Needle Type:echogenic  Resistance on Injection: none  Sedation medications used: midazolam (VERSED) injection - Intravenous   2 mg - 5/1/2023 11:20:00 AM  fentaNYL citrate (PF) (SUBLIMAZE) injection - Intravenous   100 mcg - 5/1/2023 11:20:00 AM  Medications Used: dexamethasone (DECADRON) injection - Injection   4 mg - 5/1/2023 11:20:00 AM  ropivacaine (NAROPIN) 0.5 % injection - Injection   30 mL - 5/1/2023 11:20:00 AM      Post Assessment  Injection Assessment: negative aspiration for heme, no paresthesia on injection and incremental injection  Patient Tolerance:comfortable throughout block  Complications:no

## 2023-05-01 NOTE — ANESTHESIA PREPROCEDURE EVALUATION
Anesthesia Evaluation     Patient summary reviewed and Nursing notes reviewed   no history of anesthetic complications:  NPO Solid Status: > 8 hours  NPO Liquid Status: > 8 hours           Airway   Mallampati: II  TM distance: >3 FB  Neck ROM: full  Dental    (+) upper dentures    Pulmonary - normal exam   (+) a smoker Current,   (-) sleep apnea  Cardiovascular - normal exam    ECG reviewed    (+) hypertension, DVT, hyperlipidemia,       Neuro/Psych  (+) headaches, psychiatric history Anxiety,    (-) seizures  GI/Hepatic/Renal/Endo    (+) obesity,  GERD,      Musculoskeletal     (+) chronic pain,       ROS comment: CRPS Rt foot  Abdominal    Substance History      OB/GYN negative ob/gyn ROS         Other   arthritis,                    Anesthesia Plan    ASA 3     general with block     intravenous induction     Anesthetic plan, risks, benefits, and alternatives have been provided, discussed and informed consent has been obtained with: patient.    Plan discussed with CRNA.        CODE STATUS:

## 2023-05-15 ENCOUNTER — OFFICE VISIT (OUTPATIENT)
Dept: PODIATRY | Facility: CLINIC | Age: 43
End: 2023-05-15
Payer: MEDICAID

## 2023-05-15 VITALS — HEIGHT: 64 IN | BODY MASS INDEX: 36.54 KG/M2 | RESPIRATION RATE: 20 BRPM | WEIGHT: 214 LBS

## 2023-05-15 DIAGNOSIS — G89.29 CHRONIC FOOT PAIN, RIGHT: Primary | ICD-10-CM

## 2023-05-15 DIAGNOSIS — M19.071 ARTHRITIS OF RIGHT FOOT: ICD-10-CM

## 2023-05-15 DIAGNOSIS — M79.671 CHRONIC FOOT PAIN, RIGHT: Primary | ICD-10-CM

## 2023-05-15 DIAGNOSIS — S92.324K: ICD-10-CM

## 2023-05-15 LAB — QT INTERVAL: 394 MS

## 2023-05-15 NOTE — PROGRESS NOTES
"05/15/2023  Foot and Ankle Surgery - Established Patient/Follow-up  Provider: Dr. Domenic Malloy, DARIA  Location: [facility]    Subjective:  Vidya Becerra is a 43 y.o. female.     Chief Complaint   Patient presents with   • Right Foot - Post-op     5/1/2023   Hardware removal, right foot  2.  Calcaneal bone marrow harvest, right foot  3.  Open midfoot reduction and internal fixation, right  4.  First tarsometatarsal joint arthrodesis, right  5.  Second tarsometatarsal joint arthrodesis, right  6.  Third tarsometatarsal joint arthrodesis, right   • Post-op     BOGDAN Doran aprerika  date unknown       The patient is a 43-year-old female who returns to the clinic for a follow-up regarding her right foot. She is accompanied by an adult male.    The patient reports she is doing well. She states the nerve block lasted until the following day and noted an onset of pain \"over in here first\" then to the lateral aspect. The patient notes her pain has diminished slightly since her surgery. She states she has an ice cooler available at home and she will begin using this on her foot. She reports she was non-weightbearing status post a previous surgery for approximately 6 to 8 weeks.     Allergies   Allergen Reactions   • Medrol [Methylprednisolone] Itching       Current Outpatient Medications on File Prior to Visit   Medication Sig Dispense Refill   • albuterol sulfate  (90 Base) MCG/ACT inhaler Inhale 1-2 puffs As Needed. With bronchitis   use preop if needed bring with     • aspirin 81 MG EC tablet Take 1 tablet by mouth Daily. 30 tablet 0   • atorvastatin (LIPITOR) 40 MG tablet TAKE 1 TABLET BY MOUTH EVERY DAY AT NIGHT (Patient taking differently: Take 1 tablet by mouth Every Night.) 90 tablet 3   • baclofen (LIORESAL) 10 MG tablet Take 1 tablet by mouth At Night As Needed.     • escitalopram (LEXAPRO) 10 MG tablet Take 1 tablet by mouth Daily. Take preop     • gabapentin (NEURONTIN) 800 MG tablet Take 1 " "tablet by mouth 3 (Three) Times a Day. Take preop 90 tablet 2   • lisinopril-hydrochlorothiazide (PRINZIDE,ZESTORETIC) 20-12.5 MG per tablet TAKE 1 TABLET BY MOUTH EVERY DAY (Patient taking differently: Take 1 tablet by mouth Daily. Last dose by 1100 4/30) 30 tablet 5   • meloxicam (MOBIC) 15 MG tablet Take 1 tablet by mouth Daily. Ld 4/24     • oxyCODONE-acetaminophen (Percocet) 5-325 MG per tablet Take 1 tablet by mouth Every 8 (Eight) Hours As Needed for Severe Pain. 90 tablet 0   • pantoprazole (PROTONIX) 40 MG EC tablet Take 1 tablet by mouth Every Night.     • promethazine-dextromethorphan (PROMETHAZINE-DM) 6.25-15 MG/5ML syrup TAKE 5 ML BY MOUTH EVERY FOUR HOURS AS NEEDED FOR COUGH, DO NOT DRIVE WHILE ON MED DUE TO DROWSINESS       No current facility-administered medications on file prior to visit.       Objective   Resp 20   Ht 162.6 cm (64\")   Wt 97.1 kg (214 lb)   BMI 36.73 kg/m²     Foot/Ankle Exam    GENERAL  Orientation:  AAOx3  Affect:  appropriate    VASCULAR     Right Foot Vascularity   Normal vascular exam    Dorsalis pedis:  2+  Posterior tibial:  2+  Skin temperature:  warm  Edema grading:  None  CFT:  < 3 seconds  Pedal hair growth:  Present  Varicosities:  none     Left Foot Vascularity   Normal vascular exam    Dorsalis pedis:  2+  Posterior tibial:  2+  Skin temperature:  warm  Edema grading:  None  CFT:  < 3 seconds  Pedal hair growth:  Present  Varicosities:  none     NEUROLOGIC     Right Foot Neurologic   Light touch sensation: normal  Hot/Cold sensation: normal  Achilles reflex:  2+     Left Foot Neurologic   Light touch sensation: normal  Hot/Cold sensation:  normal  Achilles reflex:  2+    MUSCULOSKELETAL     Right Foot Musculoskeletal   Arch:  Normal     Left Foot Musculoskeletal   Arch:  Normal    MUSCLE STRENGTH     Right Foot Muscle Strength   Normal strength    Foot dorsiflexion:  5  Foot plantar flexion:  5  Foot inversion:  5  Foot eversion:  5     Left Foot Muscle Strength "   Normal strength    Foot dorsiflexion:  5  Foot plantar flexion:  5  Foot inversion:  5  Foot eversion:  5    DERMATOLOGIC      Right Foot Dermatologic   Skin  Right foot skin is intact.      Left Foot Dermatologic   Skin  Left foot skin is intact.     TESTS     Right Foot Tests   Anterior drawer: negative  Varus tilt: negative     Left Foot Tests   Anterior drawer: negative  Varus tilt: negative     Right foot additional comments: Moderate discomfort with palpation to the midfoot. Stable scar formations involving the dorsal aspect of the foot and lateral aspect of the rear foot. No open wounds, signs of inflammation or infection. Mild deformity with decreased medial arch. Limited range of motion to the midfoot. Knee brace in place to the right lower extremity.    04/11/2023  No progressive deformity or instability.    05/15/2023   Incision sites are dry and stable with intact sutures. No evidence of dehiscence or infection. Rectus foot alignment. Mild swelling to the midfoot as expected.      Assessment & Plan   Diagnoses and all orders for this visit:    1. Chronic foot pain, right (Primary)    2. Nondisplaced fracture of second metatarsal bone, right foot, subsequent encounter for fracture with nonunion    3. Arthritis of right foot        The patient returns to the office today for a follow-up regarding her right foot. Her incision sites are dry and stable with intact sutures. Patient's skin appears to have a purple hue with mild necrosis, likely secondary to blistering at the incision site. X-rays of the right foot were reviewed, revealing an improved orientation with increased hardware and fixation at 3 joints. Patient was placed into a boot today and wearing her inserts was recommended to allow for acclimation. Explained she will be non-weightbearing for 6 weeks at minimum then she will be reassessed. Returning to normal activity will be a gradual process. She may remove the boot while sleeping or resting.  Advised patient to flex her toes as she has a significant amount of scar tissue given her history of multiple surgeries. Explained her nerve was completely enclosed in scar tissue, but hopefully this will modify to some extent. Sutures will be removed today. Advised she may shower, but she should avoid submerging the right foot. Patient was provided with a TubiGrip today and recommended to wear a compression sock on a daily basis. The patient will return to the office in 4 weeks for re-evaluation and non-weightbearing imaging      No orders of the defined types were placed in this encounter.         Note is dictated utilizing voice recognition software. Unfortunately this leads to occasional typographical errors. I apologize in advance if the situation occurs. If questions occur please do not hesitate to call our office.    Transcribed from ambient dictation for JESSICA Malloy DPM by Carmencita Flores.  05/15/23   10:16 EDT    Patient or patient representative verbalized consent to the visit recording.  I have personally performed the services described in this document as transcribed by the above individual, and it is both accurate and complete.

## 2023-05-25 ENCOUNTER — OFFICE VISIT (OUTPATIENT)
Dept: PAIN MEDICINE | Facility: CLINIC | Age: 43
End: 2023-05-25
Payer: MEDICAID

## 2023-05-25 VITALS
HEART RATE: 78 BPM | RESPIRATION RATE: 16 BRPM | OXYGEN SATURATION: 98 % | DIASTOLIC BLOOD PRESSURE: 83 MMHG | SYSTOLIC BLOOD PRESSURE: 124 MMHG

## 2023-05-25 DIAGNOSIS — G90.521 COMPLEX REGIONAL PAIN SYNDROME TYPE 1 OF RIGHT LOWER EXTREMITY: ICD-10-CM

## 2023-05-25 DIAGNOSIS — G89.18 PAIN AT SURGICAL SITE: Primary | ICD-10-CM

## 2023-05-25 PROCEDURE — G0463 HOSPITAL OUTPT CLINIC VISIT: HCPCS | Performed by: STUDENT IN AN ORGANIZED HEALTH CARE EDUCATION/TRAINING PROGRAM

## 2023-05-25 RX ORDER — OXYCODONE AND ACETAMINOPHEN 7.5; 325 MG/1; MG/1
1 TABLET ORAL EVERY 8 HOURS PRN
Qty: 90 TABLET | Refills: 0 | Status: SHIPPED | OUTPATIENT
Start: 2023-06-17

## 2023-05-25 RX ORDER — OXYCODONE AND ACETAMINOPHEN 7.5; 325 MG/1; MG/1
1 TABLET ORAL EVERY 8 HOURS PRN
Qty: 90 TABLET | Refills: 0 | Status: SHIPPED | OUTPATIENT
Start: 2023-07-22

## 2023-05-25 RX ORDER — GABAPENTIN 800 MG/1
800 TABLET ORAL 3 TIMES DAILY
Qty: 90 TABLET | Refills: 2 | Status: SHIPPED | OUTPATIENT
Start: 2023-05-25

## 2023-05-25 RX ORDER — OXYCODONE AND ACETAMINOPHEN 7.5; 325 MG/1; MG/1
1 TABLET ORAL EVERY 8 HOURS PRN
Qty: 90 TABLET | Refills: 0 | Status: SHIPPED | OUTPATIENT
Start: 2023-05-25

## 2023-05-25 NOTE — PROGRESS NOTES
CHIEF COMPLAINT  Chief Complaint   Patient presents with   • Back Pain     Oxycodone  last dose  this am     • Knee Pain     Right into foot       Primary Care  Ana Rosa Doran APRN Subjective   Vidya Becerra is a 43 y.o. female  who presents for right foot and right ankle pain.  She states that she has had multiple surgeries on the right foot and right ankle and has developed what appears to be CRPS of the right lower extremity.  She describes pain especially below the knee into the right foot.  She also describes occasional swelling as well as decreased hair growth and components of allodynia in the right foot and right ankle.  She also has some occasional numbness and tingling as well as decreased strength and decreased capillary refill in the right lower extremity.  She has been tried on several medications in the past and has not gotten any significant benefit.  She is also been evaluated by podiatry without any surgical interventions at this time.    Pain  Associated symptoms include arthralgias, myalgias, numbness and weakness. Pertinent negatives include no joint swelling.   Back Pain  Associated symptoms include numbness and weakness.   Knee Pain   Associated symptoms include numbness.        Location: Right ankle and right foot  Onset: Years ago  Duration: Slowly worsening  Timing: Constant throughout the day  Quality: Sharp, stabbing, burning  Severity: Today: 8       Last Week: 8       Worst: 10  Modifying Factors: The pain is worse with movement and physical activity walking and slightly improved with rest  Functional Deficit: The pain makes it difficult for her to work and perform her activities of daily living    Physical Therapy: yes    Interval Update 05/25/2023: Requesting stronger narcotics.  Otherwise, unchanged.  Recently had foot surgery.      The following portions of the patient's history were reviewed and updated as appropriate: allergies, current medications, past family history, past  medical history, past social history, past surgical history and problem list.    Procedures:  1. 3/27/2023: Right-sided lumbar synthetic nerve block: 100% relief for 24 hours        Current Outpatient Medications:   •  albuterol sulfate  (90 Base) MCG/ACT inhaler, Inhale 1-2 puffs As Needed. With bronchitis   use preop if needed bring with, Disp: , Rfl:   •  aspirin 81 MG EC tablet, Take 1 tablet by mouth Daily., Disp: 30 tablet, Rfl: 0  •  atorvastatin (LIPITOR) 40 MG tablet, TAKE 1 TABLET BY MOUTH EVERY DAY AT NIGHT (Patient taking differently: Take 1 tablet by mouth Every Night.), Disp: 90 tablet, Rfl: 3  •  baclofen (LIORESAL) 10 MG tablet, Take 1 tablet by mouth At Night As Needed., Disp: , Rfl:   •  escitalopram (LEXAPRO) 10 MG tablet, Take 1 tablet by mouth Daily. Take preop, Disp: , Rfl:   •  gabapentin (NEURONTIN) 800 MG tablet, Take 1 tablet by mouth 3 (Three) Times a Day. Take preop, Disp: 90 tablet, Rfl: 2  •  lisinopril-hydrochlorothiazide (PRINZIDE,ZESTORETIC) 20-12.5 MG per tablet, TAKE 1 TABLET BY MOUTH EVERY DAY (Patient taking differently: Take 1 tablet by mouth Daily. Last dose by 1100 4/30), Disp: 30 tablet, Rfl: 5  •  meloxicam (MOBIC) 15 MG tablet, Take 1 tablet by mouth Daily. Ld 4/24, Disp: , Rfl:   •  pantoprazole (PROTONIX) 40 MG EC tablet, Take 1 tablet by mouth Every Night., Disp: , Rfl:   •  promethazine-dextromethorphan (PROMETHAZINE-DM) 6.25-15 MG/5ML syrup, TAKE 5 ML BY MOUTH EVERY FOUR HOURS AS NEEDED FOR COUGH, DO NOT DRIVE WHILE ON MED DUE TO DROWSINESS, Disp: , Rfl:   •  [START ON 7/22/2023] oxyCODONE-acetaminophen (PERCOCET) 7.5-325 MG per tablet, Take 1 tablet by mouth Every 8 (Eight) Hours As Needed for Severe Pain., Disp: 90 tablet, Rfl: 0  •  [START ON 6/17/2023] oxyCODONE-acetaminophen (PERCOCET) 7.5-325 MG per tablet, Take 1 tablet by mouth Every 8 (Eight) Hours As Needed for Severe Pain., Disp: 90 tablet, Rfl: 0  •  oxyCODONE-acetaminophen (PERCOCET) 7.5-325 MG per  tablet, Take 1 tablet by mouth Every 8 (Eight) Hours As Needed for Severe Pain., Disp: 90 tablet, Rfl: 0    Review of Systems   Musculoskeletal: Positive for arthralgias, back pain, gait problem and myalgias. Negative for joint swelling.   Neurological: Positive for weakness and numbness.       Vitals:    05/25/23 1413   BP: 124/83   Pulse: 78   Resp: 16   SpO2: 98%   PainSc:   9       Urine Drug Screen: 4/27/2023  Appropriate: Yes    Objective   Physical Exam  Vitals and nursing note reviewed. Exam conducted with a chaperone present.   Constitutional:       General: She is not in acute distress.     Appearance: Normal appearance. She is normal weight.   Musculoskeletal:      Comments: Right foot is tender to palpation especially below the knee.  She also has decreased capillary refill in the right lower extremity compared to the left.  She shows decreased hair growth as well as some component of swelling compared to the left foot.  She is also losing some muscle mass and muscle strength in the right foot       Neurological:      Mental Status: She is alert.      Sensory: Sensory deficit present.      Motor: Weakness present.           Assessment & Plan   Problems Addressed this Visit    None  Visit Diagnoses     Pain at surgical site    -  Primary    Relevant Medications    oxyCODONE-acetaminophen (PERCOCET) 7.5-325 MG per tablet (Start on 7/22/2023)    oxyCODONE-acetaminophen (PERCOCET) 7.5-325 MG per tablet (Start on 6/17/2023)    oxyCODONE-acetaminophen (PERCOCET) 7.5-325 MG per tablet    Complex regional pain syndrome type 1 of right lower extremity        Relevant Medications    oxyCODONE-acetaminophen (PERCOCET) 7.5-325 MG per tablet (Start on 7/22/2023)    oxyCODONE-acetaminophen (PERCOCET) 7.5-325 MG per tablet (Start on 6/17/2023)    oxyCODONE-acetaminophen (PERCOCET) 7.5-325 MG per tablet      Diagnoses       Codes Comments    Pain at surgical site    -  Primary ICD-10-CM: G89.18  ICD-9-CM: 338.18      Complex regional pain syndrome type 1 of right lower extremity     ICD-10-CM: G90.521  ICD-9-CM: 337.22           Plan:  1. Increase oxycodone to 7.5 mg 3 times daily  2. Refill gabapentin  3. UDS and inspect appropriate  --- Follow-up 3 months           INSPECT REPORT    As part of the patient's treatment plan, I may be prescribing controlled substances. The patient has been made aware of appropriate use of such medications, including potential risk of somnolence, limited ability to drive and/or work safely, and the potential for dependence or overdose. It has also bee made clear that these medications are for use by this patient only, without concomitant use of alcohol or other substances unless prescribed.     Patient has completed prescribing agreement detailing terms of continued prescribing of controlled substances, including monitoring CRISSY reports, urine drug screening, and pill counts if necessary. The patient is aware that inappropriate use will results in cessation of prescribing such medications.    INSPECT report has been reviewed and scanned into the patient's chart.    As the clinician, I personally reviewed the INSPECT from 5/24/2023.    History and physical exam exhibit continued safe and appropriate use of controlled substances.      EMR Dragon/Transcription disclaimer:   Much of this encounter note is an electronic transcription/translation of spoken language to printed text. The electronic translation of spoken language may permit erroneous, or at times, nonsensical words or phrases to be inadvertently transcribed; Although I have reviewed the note for such errors, some may still exist.

## 2023-05-26 ENCOUNTER — TELEPHONE (OUTPATIENT)
Dept: PAIN MEDICINE | Facility: CLINIC | Age: 43
End: 2023-05-26

## 2023-05-26 NOTE — TELEPHONE ENCOUNTER
Caller: KYLAH   Relationship to Patient: SELF  Phone Number: [phone]  Reason for Call: PATIENT NEEDS PA FOR MEDICAITON

## 2023-05-30 ENCOUNTER — TELEPHONE (OUTPATIENT)
Dept: PAIN MEDICINE | Facility: CLINIC | Age: 43
End: 2023-05-30

## 2023-05-30 DIAGNOSIS — Z79.899 HIGH RISK MEDICATION USE: ICD-10-CM

## 2023-05-30 DIAGNOSIS — G90.521 COMPLEX REGIONAL PAIN SYNDROME TYPE 1 OF RIGHT LOWER EXTREMITY: Primary | ICD-10-CM

## 2023-05-30 NOTE — TELEPHONE ENCOUNTER
Caller: PATIENT     Relationship to patient: SELF     Best call back number: [phone]    Patient is needing: PATIENT WAS CALLING TO DISCUSS HER PRESCRIPTION FOR OXYCODONE-ACETAMINOPHEN 7.5 MG. PATIENT STATED THE [facility] IN [place], [place] HAS 5 MG OR 10 MG IN STOCK BUT NOT 7.5 MG. PATIENT IS REQUESTING HER DOSAGE BE CHANGED SO SHE CAN FILL HER PRESCRIPTION AT THE [facility] IN [place], [place] & THEIR PHONE NUMBER [phone]. PATIENT IS REQUESTING DR. MCCOY TO CHANGE HER MEDICATION TO 5 MG OR 10 MG DUE TO THE [facility] IN [place], [place] ONLY HAVING THAT MEDICATION IN STOCK. PATIENT STATED A PRIOR AUTHORIZATION WOULD BE NEEDED IF HER PRESCRIPTION IS GETTING CHANGED. PATIENT STATED SHE TRIED TO CALL THE CLINICAL LINE BUT WAS UNABLE TO GET THRU. THANK YOU!

## 2023-05-31 RX ORDER — OXYCODONE HYDROCHLORIDE AND ACETAMINOPHEN 5; 325 MG/1; MG/1
1 TABLET ORAL EVERY 6 HOURS PRN
Qty: 120 TABLET | Refills: 0 | Status: SHIPPED | OUTPATIENT
Start: 2023-05-31 | End: 2023-06-05 | Stop reason: SDUPTHER

## 2023-05-31 NOTE — TELEPHONE ENCOUNTER
I'll send for 5mg QID prn, that's as close as I can get to 7.5mg TID prn. Inspect reviewed, sent, thanks.

## 2023-06-05 RX ORDER — OXYCODONE HYDROCHLORIDE AND ACETAMINOPHEN 5; 325 MG/1; MG/1
1 TABLET ORAL EVERY 6 HOURS PRN
Qty: 120 TABLET | Refills: 0 | Status: SHIPPED | OUTPATIENT
Start: 2023-06-05

## 2023-06-05 NOTE — TELEPHONE ENCOUNTER
Pt has found a pharmacy in [place] that will let her pay cash for her rx.  Leticia sent her in the percocet 5mg while you were out and we can't get her insurance to cover it.  Can you send it into the [facility] on [facility] so she can try to pay cash. Inspect in chart

## 2023-06-05 NOTE — TELEPHONE ENCOUNTER
Spoke to pt to let her know that the pa had been denied. We would have to appeal it and that can take some time.  Told pt she may want to see about paying cash if the pharmacy will let her.  Pt will let us know what she finds out

## 2023-06-13 ENCOUNTER — OFFICE VISIT (OUTPATIENT)
Dept: PODIATRY | Facility: CLINIC | Age: 43
End: 2023-06-13
Payer: MEDICAID

## 2023-06-13 VITALS — BODY MASS INDEX: 36.54 KG/M2 | WEIGHT: 214 LBS | HEIGHT: 64 IN | RESPIRATION RATE: 20 BRPM

## 2023-06-13 DIAGNOSIS — M79.671 CHRONIC FOOT PAIN, RIGHT: Primary | ICD-10-CM

## 2023-06-13 DIAGNOSIS — M19.071 ARTHRITIS OF RIGHT FOOT: ICD-10-CM

## 2023-06-13 DIAGNOSIS — G89.29 CHRONIC FOOT PAIN, RIGHT: Primary | ICD-10-CM

## 2023-06-13 DIAGNOSIS — S92.324K: ICD-10-CM

## 2023-06-13 NOTE — PROGRESS NOTES
06/13/2023  Foot and Ankle Surgery - Established Patient/Follow-up  Provider: Dr. Domenic Malloy DPM  Location: [facility]    Subjective:  Vidya Becerra is a 43 y.o. female.     Chief Complaint   Patient presents with    Right Foot - Fracture, Follow-up     Nondisplaced fx 2nd metatarsal      Follow-up     BOGDAN mchugh       HPI: The patient is a 43-year-old female who presents to the clinic for a follow-up regarding her right foot.    She reports she is six weeks status post surgery on 05/01/2023. She notes she has been non-weightbearing. She reports she has noticed some erythema. The patient inquires if the edema will decrease. She reports she still elevates her foot; however, she does not sleep with it elevated. She affirms using a boot and obtaining an over-the-counter arch support. She notes the only time she will ambulate freely is from her bed to the toilet. She inquires when she can start physical therapy.    Allergies   Allergen Reactions    Medrol [Methylprednisolone] Itching       Current Outpatient Medications on File Prior to Visit   Medication Sig Dispense Refill    albuterol sulfate  (90 Base) MCG/ACT inhaler Inhale 1-2 puffs As Needed. With bronchitis   use preop if needed bring with      aspirin 81 MG EC tablet Take 1 tablet by mouth Daily. 30 tablet 0    atorvastatin (LIPITOR) 40 MG tablet TAKE 1 TABLET BY MOUTH EVERY DAY AT NIGHT (Patient taking differently: Take 1 tablet by mouth Every Night.) 90 tablet 3    baclofen (LIORESAL) 10 MG tablet Take 1 tablet by mouth At Night As Needed.      escitalopram (LEXAPRO) 10 MG tablet Take 1 tablet by mouth Daily. Take preop      gabapentin (NEURONTIN) 800 MG tablet Take 1 tablet by mouth 3 (Three) Times a Day. Take preop 90 tablet 2    lisinopril-hydrochlorothiazide (PRINZIDE,ZESTORETIC) 20-12.5 MG per tablet TAKE 1 TABLET BY MOUTH EVERY DAY (Patient taking differently: Take 1 tablet by mouth Daily. Last dose by 1100 4/30) 30 tablet 5  "   meloxicam (MOBIC) 15 MG tablet Take 1 tablet by mouth Daily. Ld 4/24      oxyCODONE-acetaminophen (Percocet) 5-325 MG per tablet Take 1 tablet by mouth Every 6 (Six) Hours As Needed for Severe Pain. 120 tablet 0    [START ON 7/22/2023] oxyCODONE-acetaminophen (PERCOCET) 7.5-325 MG per tablet Take 1 tablet by mouth Every 8 (Eight) Hours As Needed for Severe Pain. 90 tablet 0    [START ON 6/17/2023] oxyCODONE-acetaminophen (PERCOCET) 7.5-325 MG per tablet Take 1 tablet by mouth Every 8 (Eight) Hours As Needed for Severe Pain. 90 tablet 0    pantoprazole (PROTONIX) 40 MG EC tablet Take 1 tablet by mouth Every Night.      promethazine-dextromethorphan (PROMETHAZINE-DM) 6.25-15 MG/5ML syrup TAKE 5 ML BY MOUTH EVERY FOUR HOURS AS NEEDED FOR COUGH, DO NOT DRIVE WHILE ON MED DUE TO DROWSINESS       No current facility-administered medications on file prior to visit.       Objective   Resp 20   Ht 162.6 cm (64\")   Wt 97.1 kg (214 lb)   BMI 36.73 kg/m²     Foot/Ankle Exam    GENERAL  Orientation:  AAOx3  Affect:  appropriate    VASCULAR     Right Foot Vascularity   Normal vascular exam    Dorsalis pedis:  2+  Posterior tibial:  2+  Skin temperature:  warm  Edema grading:  None  CFT:  < 3 seconds  Pedal hair growth:  Present  Varicosities:  none     Left Foot Vascularity   Normal vascular exam    Dorsalis pedis:  2+  Posterior tibial:  2+  Skin temperature:  warm  Edema grading:  None  CFT:  < 3 seconds  Pedal hair growth:  Present  Varicosities:  none     NEUROLOGIC     Right Foot Neurologic   Light touch sensation: normal  Hot/Cold sensation: normal  Achilles reflex:  2+     Left Foot Neurologic   Light touch sensation: normal  Hot/Cold sensation:  normal  Achilles reflex:  2+    MUSCULOSKELETAL     Right Foot Musculoskeletal   Arch:  Normal     Left Foot Musculoskeletal   Arch:  Normal    MUSCLE STRENGTH     Right Foot Muscle Strength   Normal strength    Foot dorsiflexion:  5  Foot plantar flexion:  5  Foot " inversion:  5  Foot eversion:  5     Left Foot Muscle Strength   Normal strength    Foot dorsiflexion:  5  Foot plantar flexion:  5  Foot inversion:  5  Foot eversion:  5    DERMATOLOGIC      Right Foot Dermatologic   Skin  Right foot skin is intact.      Left Foot Dermatologic   Skin  Left foot skin is intact.     TESTS     Right Foot Tests   Anterior drawer: negative  Varus tilt: negative     Left Foot Tests   Anterior drawer: negative  Varus tilt: negative     Right foot additional comments: Moderate discomfort with palpation to the midfoot. Stable scar formations involving the dorsal aspect of the foot and lateral aspect of the rear foot. No open wounds, signs of inflammation or infection. Mild deformity with decreased medial arch. Limited range of motion to the midfoot. Knee brace in place to the right lower extremity.    04/11/2023  No progressive deformity or instability.    05/15/2023   Incision sites are dry and stable with intact sutures. No evidence of dehiscence or infection. Rectus foot alignment. Mild swelling to the midfoot as expected.    06/13/2023: Continued improvement with decreased edema and erythema. Incision sites are well healed at this time. No progressive deformity or instability.    Assessment & Plan   Diagnoses and all orders for this visit:    1. Chronic foot pain, right (Primary)    2. Nondisplaced fracture of second metatarsal bone, right foot, subsequent encounter for fracture with nonunion  -     XR Foot 3+ View Right    3. Arthritis of right foot        Patient returns for follow-up regarding her right foot approximately 6 weeks after surgery. I reviewed imaging with the patient. I discussed with the patient that the swelling will improve over time. I discussed with the patient that I expect her to be radiographically fused for a few months and I do not expect it to be radiographically fused for a few more weeks. I discussed with the patient that I want her to remain  non-weightbearing, but she can wear the boot with crutches or a walker around the house with only partial weightbearing to stabilize her self.  I advised the patient to place the arch support in her boot and not to ambulate without her boot. I discussed with the patient that she will have discomfort if she starts to put any amount of weight on this, which is normal. I discussed with the patient that I expect her to be safer than recovery. I discussed with the patient that she will have a lot of atrophy and weakness. I will see her back in another 4 weeks and we will repeat another x-ray at that time.    Orders Placed This Encounter   Procedures    XR Foot 3+ View Right     Order Specific Question:   Reason for Exam:     Answer:   nondisplaced fx 2nd metatarsal    room 10   non wb     Order Specific Question:   Patient Pregnant     Answer:   No     Order Specific Question:   Does this patient have a diabetic monitoring/medication delivering device on?     Answer:   No     Order Specific Question:   Release to patient     Answer:   Routine Release          Note is dictated utilizing voice recognition software. Unfortunately this leads to occasional typographical errors. I apologize in advance if the situation occurs. If questions occur please do not hesitate to call our office.    Transcribed from ambient dictation for JESSICA Malloy DPM by Shy Dillon.  06/13/23   13:17 EDT    Patient or patient representative verbalized consent to the visit recording.  I have personally performed the services described in this document as transcribed by the above individual, and it is both accurate and complete.

## 2023-07-31 ENCOUNTER — TREATMENT (OUTPATIENT)
Dept: PHYSICAL THERAPY | Facility: CLINIC | Age: 43
End: 2023-07-31
Payer: MEDICAID

## 2023-07-31 DIAGNOSIS — Z98.1 HISTORY OF ANKLE FUSION: ICD-10-CM

## 2023-07-31 DIAGNOSIS — S92.324K: ICD-10-CM

## 2023-07-31 DIAGNOSIS — M19.071 ARTHRITIS OF RIGHT FOOT: Primary | ICD-10-CM

## 2023-07-31 DIAGNOSIS — M79.671 RIGHT FOOT PAIN: ICD-10-CM

## 2023-07-31 PROCEDURE — 97530 THERAPEUTIC ACTIVITIES: CPT | Performed by: PHYSICAL THERAPIST

## 2023-07-31 PROCEDURE — 97110 THERAPEUTIC EXERCISES: CPT | Performed by: PHYSICAL THERAPIST

## 2023-07-31 PROCEDURE — 97161 PT EVAL LOW COMPLEX 20 MIN: CPT | Performed by: PHYSICAL THERAPIST

## 2023-08-03 ENCOUNTER — TREATMENT (OUTPATIENT)
Dept: PHYSICAL THERAPY | Facility: CLINIC | Age: 43
End: 2023-08-03
Payer: MEDICAID

## 2023-08-03 DIAGNOSIS — S92.324K: Primary | ICD-10-CM

## 2023-08-03 DIAGNOSIS — M19.071 ARTHRITIS OF RIGHT FOOT: ICD-10-CM

## 2023-08-03 DIAGNOSIS — M79.671 RIGHT FOOT PAIN: ICD-10-CM

## 2023-08-03 DIAGNOSIS — Z98.1 HISTORY OF ANKLE FUSION: ICD-10-CM

## 2023-08-07 ENCOUNTER — TREATMENT (OUTPATIENT)
Dept: PHYSICAL THERAPY | Facility: CLINIC | Age: 43
End: 2023-08-07
Payer: MEDICAID

## 2023-08-07 DIAGNOSIS — M79.671 RIGHT FOOT PAIN: ICD-10-CM

## 2023-08-07 DIAGNOSIS — Z98.1 HISTORY OF ANKLE FUSION: ICD-10-CM

## 2023-08-07 DIAGNOSIS — S92.324K: Primary | ICD-10-CM

## 2023-08-07 DIAGNOSIS — R26.9 GAIT DISTURBANCE: ICD-10-CM

## 2023-08-07 PROCEDURE — 97530 THERAPEUTIC ACTIVITIES: CPT | Performed by: PHYSICAL THERAPIST

## 2023-08-07 PROCEDURE — 97110 THERAPEUTIC EXERCISES: CPT | Performed by: PHYSICAL THERAPIST

## 2023-08-07 NOTE — PROGRESS NOTES
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      Objective   See Exercise, Manual, and Modality Logs for complete treatment. SLS = 30 sec L, 10 sec R.  Reviewed and updated HEP with B 3 way hip in standing, 4 way ankle with yellow TB.  Patient is allowed to wean from boot but doesn't have a shoe that fits right now.  Add step ups when able to get into shoe.  Discussed possible strapped sport sandal or visiting pacers and racers to get their opinion.        Assessment/Plan  STG to be met in 3 wk:  - PT to assess SLS in 3-4 visits. - MET  - Pt to tolerate NuStep x10 min. - NOT MET  - Pt to be independent with HEP in 3 weeks. - NOT MET  LTG to be met in 12 wk:  - Improve FAAM score to 40% impairment or less (76% impairment initially) - NOT MET  - Increase R ankle strength to 4+/5 in all directions in order to walk in her yard safely.- NOT MET  - Pt to negotiate stairs with reciprocal pattern and 1 handrail to negotiate in community. - NOT MET  - Pt to ambulate in clinic without ankle brace with continuous pattern and R heel strike and toe off in order to walk in her home to complete housework. - NOT MET     Progress per Plan of Care exp 10/23/23           Timed:         Manual Therapy:        mins  08975;     Therapeutic Exercise:    15     mins  43135;     Neuromuscular Annamarie:        mins  51070;    Therapeutic Activity:     15     mins  66451;     Gait Training:           mins  13208;     Ultrasound:          mins  81776;    Ionto                                   mins   43313  Self Care                             mins   26020      Un-Timed:  Electrical Stimulation:         mins  64991 ( );  Dry Needling          mins self-pay  Traction          mins 18369  Low Eval          Mins  04436  Mod Eval          Mins  11371  High Eval                            Mins  42818  Canalith Repos                   mins  00702    Timed Treatment:   30   mins   Total Treatment:     30   mins    Robin A Sprigler, PT  Physical Therapist

## 2023-08-14 ENCOUNTER — TREATMENT (OUTPATIENT)
Dept: PHYSICAL THERAPY | Facility: CLINIC | Age: 43
End: 2023-08-14
Payer: MEDICAID

## 2023-08-14 DIAGNOSIS — M79.671 RIGHT FOOT PAIN: ICD-10-CM

## 2023-08-14 DIAGNOSIS — R26.9 GAIT DISTURBANCE: ICD-10-CM

## 2023-08-14 DIAGNOSIS — S92.324K: Primary | ICD-10-CM

## 2023-08-14 DIAGNOSIS — Z98.1 HISTORY OF ANKLE FUSION: ICD-10-CM

## 2023-08-14 NOTE — PROGRESS NOTES
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  She still hasn't tried her tennis shoe, still wearing boot all the time.      Objective   See Exercise, Manual, and Modality Logs for complete treatment. Still wearing boot, unable to progress in shoe. SLS = 30 sec L, 10 sec R.  Reviewed and updated HEP with B 3 way hip in standing, 4 way ankle with yellow TB.  Patient is allowed to wean from boot but doesn't have a shoe that fits right now.  Add step ups when able to get into shoe.  Discussed possible strapped sport sandal or visiting pacers and racers to get their opinion.           Assessment/Plan  STG to be met in 3 wk:  - PT to assess SLS in 3-4 visits. - MET  - Pt to tolerate NuStep x10 min. - MET  - Pt to be independent with HEP in 3 weeks. - NOT MET  LTG to be met in 12 wk:  - Improve FAAM score to 40% impairment or less (76% impairment initially) - NOT MET  - Increase R ankle strength to 4+/5 in all directions in order to walk in her yard safely.- NOT MET  - Pt to negotiate stairs with reciprocal pattern and 1 handrail to negotiate in community. - NOT MET  - Pt to ambulate in clinic without ankle brace with continuous pattern and R heel strike and toe off in order to walk in her home to complete housework. - NOT MET    Progress per Plan of Care exp 10/23/23           Timed:         Manual Therapy:         mins  46406;     Therapeutic Exercise:    15     mins  15874;     Neuromuscular Annamarie:        mins  70767;    Therapeutic Activity:     15     mins  05672;     Gait Training:           mins  65524;     Ultrasound:           mins  61559;    Ionto                                   mins   34265  Self Care                            mins   37871      Un-Timed:  Electrical Stimulation:         mins  43389 ( );  Dry Needling          mins self-pay  Traction          mins 07149  Low Eval          Mins  37404  Mod Eval          Mins  06434  High Eval                            Mins  64869  Canalith Repos                   mins  90952    Timed Treatment:   30   mins   Total Treatment:     30   mins    Robin A Sprigler, PT  Physical Therapist

## 2023-08-18 ENCOUNTER — OFFICE VISIT (OUTPATIENT)
Dept: PAIN MEDICINE | Facility: CLINIC | Age: 43
End: 2023-08-18
Payer: MEDICAID

## 2023-08-18 ENCOUNTER — TELEPHONE (OUTPATIENT)
Dept: ORTHOPEDIC SURGERY | Facility: CLINIC | Age: 43
End: 2023-08-18

## 2023-08-18 VITALS
OXYGEN SATURATION: 96 % | DIASTOLIC BLOOD PRESSURE: 96 MMHG | HEART RATE: 85 BPM | RESPIRATION RATE: 16 BRPM | SYSTOLIC BLOOD PRESSURE: 143 MMHG

## 2023-08-18 DIAGNOSIS — G89.18 PAIN AT SURGICAL SITE: ICD-10-CM

## 2023-08-18 DIAGNOSIS — G90.521 COMPLEX REGIONAL PAIN SYNDROME TYPE 1 OF RIGHT LOWER EXTREMITY: ICD-10-CM

## 2023-08-18 PROCEDURE — 99214 OFFICE O/P EST MOD 30 MIN: CPT | Performed by: STUDENT IN AN ORGANIZED HEALTH CARE EDUCATION/TRAINING PROGRAM

## 2023-08-18 PROCEDURE — 1160F RVW MEDS BY RX/DR IN RCRD: CPT | Performed by: STUDENT IN AN ORGANIZED HEALTH CARE EDUCATION/TRAINING PROGRAM

## 2023-08-18 PROCEDURE — 1159F MED LIST DOCD IN RCRD: CPT | Performed by: STUDENT IN AN ORGANIZED HEALTH CARE EDUCATION/TRAINING PROGRAM

## 2023-08-18 PROCEDURE — 3077F SYST BP >= 140 MM HG: CPT | Performed by: STUDENT IN AN ORGANIZED HEALTH CARE EDUCATION/TRAINING PROGRAM

## 2023-08-18 PROCEDURE — G0463 HOSPITAL OUTPT CLINIC VISIT: HCPCS | Performed by: STUDENT IN AN ORGANIZED HEALTH CARE EDUCATION/TRAINING PROGRAM

## 2023-08-18 PROCEDURE — 1125F AMNT PAIN NOTED PAIN PRSNT: CPT | Performed by: STUDENT IN AN ORGANIZED HEALTH CARE EDUCATION/TRAINING PROGRAM

## 2023-08-18 PROCEDURE — 3080F DIAST BP >= 90 MM HG: CPT | Performed by: STUDENT IN AN ORGANIZED HEALTH CARE EDUCATION/TRAINING PROGRAM

## 2023-08-18 RX ORDER — OXYCODONE AND ACETAMINOPHEN 7.5; 325 MG/1; MG/1
1 TABLET ORAL EVERY 8 HOURS PRN
Qty: 90 TABLET | Refills: 0 | Status: SHIPPED | OUTPATIENT
Start: 2023-10-04

## 2023-08-18 RX ORDER — OXYCODONE AND ACETAMINOPHEN 7.5; 325 MG/1; MG/1
1 TABLET ORAL EVERY 8 HOURS PRN
Qty: 90 TABLET | Refills: 0 | Status: SHIPPED | OUTPATIENT
Start: 2023-09-05

## 2023-08-18 RX ORDER — GABAPENTIN 800 MG/1
800 TABLET ORAL 3 TIMES DAILY
Qty: 90 TABLET | Refills: 2 | Status: SHIPPED | OUTPATIENT
Start: 2023-08-18

## 2023-08-18 RX ORDER — OXYCODONE AND ACETAMINOPHEN 7.5; 325 MG/1; MG/1
1 TABLET ORAL EVERY 8 HOURS PRN
Qty: 90 TABLET | Refills: 0 | Status: SHIPPED | OUTPATIENT
Start: 2023-11-03

## 2023-08-18 NOTE — TELEPHONE ENCOUNTER
Caller: Vidya Becerra    Relationship to patient: Self    Best call back number: [phone]    Chief complaint: RIGHT KNEE INJECTION     Type of visit: INJECTION    Requested date:ASAP     Additional notes:PATIENT STATES THAT THE INJECTION GIVEN ON 03/2023 HAS WORN OFF

## 2023-08-18 NOTE — PROGRESS NOTES
CHIEF COMPLAINT  Chief Complaint   Patient presents with    Foot Pain     Oxycodone LD 8/18@1030    Knee Pain    Back Pain       Primary Care  Ana Rosa Doran, GENEVA Rainey   Vidya Becerra is a 43 y.o. female  who presents for right foot and right ankle pain.  She states that she has had multiple surgeries on the right foot and right ankle and has developed what appears to be CRPS of the right lower extremity.  She describes pain especially below the knee into the right foot.  She also describes occasional swelling as well as decreased hair growth and components of allodynia in the right foot and right ankle.  She also has some occasional numbness and tingling as well as decreased strength and decreased capillary refill in the right lower extremity.  She has been tried on several medications in the past and has not gotten any significant benefit.  She is also been evaluated by podiatry without any surgical interventions at this time.    Back Pain  Associated symptoms include numbness and weakness.   Knee Pain   Associated symptoms include numbness.   Pain  Associated symptoms include arthralgias, myalgias, numbness and weakness. Pertinent negatives include no joint swelling.   Foot Pain  Associated symptoms include arthralgias, myalgias, numbness and weakness. Pertinent negatives include no joint swelling.      Location: Right ankle and right foot  Onset: Years ago  Duration: Slowly worsening  Timing: Constant throughout the day  Quality: Sharp, stabbing, burning  Severity: Today: 5       Last Week: 8       Worst: 10  Modifying Factors: The pain is worse with movement and physical activity walking and slightly improved with rest  Functional Deficit: The pain makes it difficult for her to work and perform her activities of daily living    Physical Therapy: yes    Interval Update 08/18/2023: Her pain appears to be somewhat improving.  She continues to walk with the boot however she has been cleared for a regular  shoe.  Otherwise, working with physical therapy.  Denies constipation or sedation      The following portions of the patient's history were reviewed and updated as appropriate: allergies, current medications, past family history, past medical history, past social history, past surgical history and problem list.    Procedures:  3/27/2023: Right-sided lumbar synthetic nerve block: 100% relief for 24 hours        Current Outpatient Medications:     albuterol sulfate  (90 Base) MCG/ACT inhaler, Inhale 1-2 puffs As Needed. With bronchitis   use preop if needed bring with, Disp: , Rfl:     aspirin 81 MG EC tablet, Take 1 tablet by mouth Daily., Disp: 30 tablet, Rfl: 0    atorvastatin (LIPITOR) 40 MG tablet, TAKE 1 TABLET BY MOUTH EVERY DAY AT NIGHT (Patient taking differently: Take 1 tablet by mouth Every Night.), Disp: 90 tablet, Rfl: 3    baclofen (LIORESAL) 10 MG tablet, Take 1 tablet by mouth At Night As Needed., Disp: , Rfl:     escitalopram (LEXAPRO) 10 MG tablet, Take 1 tablet by mouth Daily. Take preop, Disp: , Rfl:     gabapentin (NEURONTIN) 800 MG tablet, Take 1 tablet by mouth 3 (Three) Times a Day. Take preop, Disp: 90 tablet, Rfl: 2    lisinopril-hydrochlorothiazide (PRINZIDE,ZESTORETIC) 20-12.5 MG per tablet, TAKE 1 TABLET BY MOUTH EVERY DAY (Patient taking differently: Take 1 tablet by mouth Daily. Last dose by 1100 4/30), Disp: 30 tablet, Rfl: 5    meloxicam (MOBIC) 15 MG tablet, Take 1 tablet by mouth Daily. Ld 4/24, Disp: , Rfl:     [START ON 11/3/2023] oxyCODONE-acetaminophen (PERCOCET) 7.5-325 MG per tablet, Take 1 tablet by mouth Every 8 (Eight) Hours As Needed for Severe Pain., Disp: 90 tablet, Rfl: 0    [START ON 10/4/2023] oxyCODONE-acetaminophen (PERCOCET) 7.5-325 MG per tablet, Take 1 tablet by mouth Every 8 (Eight) Hours As Needed for Severe Pain., Disp: 90 tablet, Rfl: 0    pantoprazole (PROTONIX) 40 MG EC tablet, Take 1 tablet by mouth Every Night., Disp: , Rfl:      promethazine-dextromethorphan (PROMETHAZINE-DM) 6.25-15 MG/5ML syrup, TAKE 5 ML BY MOUTH EVERY FOUR HOURS AS NEEDED FOR COUGH, DO NOT DRIVE WHILE ON MED DUE TO DROWSINESS, Disp: , Rfl:     [START ON 9/5/2023] oxyCODONE-acetaminophen (PERCOCET) 7.5-325 MG per tablet, Take 1 tablet by mouth Every 8 (Eight) Hours As Needed for Severe Pain., Disp: 90 tablet, Rfl: 0    Review of Systems   Musculoskeletal:  Positive for arthralgias, back pain, gait problem and myalgias. Negative for joint swelling.   Neurological:  Positive for weakness and numbness.     Vitals:    08/18/23 1259   BP: 143/96   Pulse: 85   Resp: 16   SpO2: 96%   PainSc:   5       Urine Drug Screen: 4/27/2023  Appropriate: Yes    Objective   Physical Exam  Vitals and nursing note reviewed. Exam conducted with a chaperone present.   Constitutional:       General: She is not in acute distress.     Appearance: Normal appearance. She is normal weight.   Musculoskeletal:      Comments: Right foot is tender to palpation especially below the knee.  She also has decreased capillary refill in the right lower extremity compared to the left.  She shows decreased hair growth as well as some component of swelling compared to the left foot.  She is also losing some muscle mass and muscle strength in the right foot       Neurological:      Mental Status: She is alert.      Sensory: Sensory deficit present.      Motor: Weakness present.         Assessment & Plan   Problems Addressed this Visit    None  Visit Diagnoses       Pain at surgical site        Relevant Medications    oxyCODONE-acetaminophen (PERCOCET) 7.5-325 MG per tablet (Start on 11/3/2023)    oxyCODONE-acetaminophen (PERCOCET) 7.5-325 MG per tablet (Start on 10/4/2023)    oxyCODONE-acetaminophen (PERCOCET) 7.5-325 MG per tablet (Start on 9/5/2023)    Complex regional pain syndrome type 1 of right lower extremity        Relevant Medications    oxyCODONE-acetaminophen (PERCOCET) 7.5-325 MG per tablet (Start on  11/3/2023)    oxyCODONE-acetaminophen (PERCOCET) 7.5-325 MG per tablet (Start on 10/4/2023)    oxyCODONE-acetaminophen (PERCOCET) 7.5-325 MG per tablet (Start on 9/5/2023)          Diagnoses         Codes Comments    Pain at surgical site     ICD-10-CM: G89.18  ICD-9-CM: 338.18     Complex regional pain syndrome type 1 of right lower extremity     ICD-10-CM: G90.521  ICD-9-CM: 337.22             Plan:  Continue Percocet 7.5 mg 3 times daily  Refill gabapentin  UDS and inspect appropriate  --- Follow-up 3 months           INSPECT REPORT    As part of the patient's treatment plan, I may be prescribing controlled substances. The patient has been made aware of appropriate use of such medications, including potential risk of somnolence, limited ability to drive and/or work safely, and the potential for dependence or overdose. It has also bee made clear that these medications are for use by this patient only, without concomitant use of alcohol or other substances unless prescribed.     Patient has completed prescribing agreement detailing terms of continued prescribing of controlled substances, including monitoring CRISSY reports, urine drug screening, and pill counts if necessary. The patient is aware that inappropriate use will results in cessation of prescribing such medications.    INSPECT report has been reviewed and scanned into the patient's chart.    As the clinician, I personally reviewed the INSPECT from 8/15/2023.    History and physical exam exhibit continued safe and appropriate use of controlled substances.      EMR Dragon/Transcription disclaimer:   Much of this encounter note is an electronic transcription/translation of spoken language to printed text. The electronic translation of spoken language may permit erroneous, or at times, nonsensical words or phrases to be inadvertently transcribed; Although I have reviewed the note for such errors, some may still exist.

## 2023-08-21 ENCOUNTER — TELEPHONE (OUTPATIENT)
Dept: ORTHOPEDICS | Facility: OTHER | Age: 43
End: 2023-08-21
Payer: MEDICAID

## 2023-08-21 ENCOUNTER — OFFICE VISIT (OUTPATIENT)
Dept: SPORTS MEDICINE | Facility: CLINIC | Age: 43
End: 2023-08-21
Payer: MEDICAID

## 2023-08-21 VITALS — HEIGHT: 64 IN | BODY MASS INDEX: 38.07 KG/M2 | HEART RATE: 76 BPM | OXYGEN SATURATION: 98 % | WEIGHT: 223 LBS

## 2023-08-21 DIAGNOSIS — S83.521S RUPTURE OF POSTERIOR CRUCIATE LIGAMENT OF RIGHT KNEE, SEQUELA: ICD-10-CM

## 2023-08-21 DIAGNOSIS — M17.11 ARTHRITIS OF RIGHT KNEE: Primary | ICD-10-CM

## 2023-08-21 DIAGNOSIS — G89.29 CHRONIC PAIN OF RIGHT KNEE: ICD-10-CM

## 2023-08-21 DIAGNOSIS — M25.561 CHRONIC PAIN OF RIGHT KNEE: ICD-10-CM

## 2023-08-21 PROCEDURE — 99213 OFFICE O/P EST LOW 20 MIN: CPT | Performed by: STUDENT IN AN ORGANIZED HEALTH CARE EDUCATION/TRAINING PROGRAM

## 2023-08-21 PROCEDURE — 20610 DRAIN/INJ JOINT/BURSA W/O US: CPT | Performed by: STUDENT IN AN ORGANIZED HEALTH CARE EDUCATION/TRAINING PROGRAM

## 2023-08-21 RX ORDER — TRIAMCINOLONE ACETONIDE 40 MG/ML
40 INJECTION, SUSPENSION INTRA-ARTICULAR; INTRAMUSCULAR
Status: DISCONTINUED | OUTPATIENT
Start: 2023-08-21 | End: 2023-08-21 | Stop reason: HOSPADM

## 2023-08-21 RX ORDER — LIDOCAINE HYDROCHLORIDE 10 MG/ML
4 INJECTION, SOLUTION EPIDURAL; INFILTRATION; INTRACAUDAL; PERINEURAL
Status: DISCONTINUED | OUTPATIENT
Start: 2023-08-21 | End: 2023-08-21 | Stop reason: HOSPADM

## 2023-08-21 RX ADMIN — TRIAMCINOLONE ACETONIDE 40 MG: 40 INJECTION, SUSPENSION INTRA-ARTICULAR; INTRAMUSCULAR at 13:22

## 2023-08-21 RX ADMIN — LIDOCAINE HYDROCHLORIDE 4 ML: 10 INJECTION, SOLUTION EPIDURAL; INFILTRATION; INTRACAUDAL; PERINEURAL at 13:22

## 2023-08-21 NOTE — PROGRESS NOTES
Procedure   Large Joint Arthrocentesis: R knee  Date/Time: 8/21/2023 1:22 PM  Consent given by: patient  Site marked: site marked  Timeout: Immediately prior to procedure a time out was called to verify the correct patient, procedure, equipment, support staff and site/side marked as required   Supporting Documentation  Indications: pain   Procedure Details  Location: knee - R knee  Preparation: Patient was prepped and draped in the usual sterile fashion  Needle size: 25 G  Approach: anterolateral  Medications administered: 40 mg triamcinolone acetonide 40 MG/ML; 4 mL lidocaine PF 1% 1 %
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
## 2023-08-24 ENCOUNTER — OFFICE VISIT (OUTPATIENT)
Dept: SPORTS MEDICINE | Facility: CLINIC | Age: 43
End: 2023-08-24
Payer: MEDICAID

## 2023-08-24 ENCOUNTER — TELEPHONE (OUTPATIENT)
Dept: ORTHOPEDICS | Facility: OTHER | Age: 43
End: 2023-08-24
Payer: MEDICAID

## 2023-08-24 VITALS — WEIGHT: 233 LBS | OXYGEN SATURATION: 97 % | BODY MASS INDEX: 39.78 KG/M2 | HEIGHT: 64 IN

## 2023-08-24 DIAGNOSIS — G89.29 CHRONIC PAIN OF LEFT KNEE: Primary | ICD-10-CM

## 2023-08-24 DIAGNOSIS — M22.42 CHONDROMALACIA, PATELLA, LEFT: ICD-10-CM

## 2023-08-24 DIAGNOSIS — M25.562 PATELLOFEMORAL ARTHRALGIA OF LEFT KNEE: ICD-10-CM

## 2023-08-24 DIAGNOSIS — M25.562 CHRONIC PAIN OF LEFT KNEE: Primary | ICD-10-CM

## 2023-08-24 RX ORDER — LIDOCAINE HYDROCHLORIDE 10 MG/ML
4 INJECTION, SOLUTION EPIDURAL; INFILTRATION; INTRACAUDAL; PERINEURAL
Status: DISCONTINUED | OUTPATIENT
Start: 2023-08-24 | End: 2023-08-24 | Stop reason: HOSPADM

## 2023-08-24 RX ORDER — TRIAMCINOLONE ACETONIDE 40 MG/ML
40 INJECTION, SUSPENSION INTRA-ARTICULAR; INTRAMUSCULAR
Status: DISCONTINUED | OUTPATIENT
Start: 2023-08-24 | End: 2023-08-24 | Stop reason: HOSPADM

## 2023-08-24 RX ADMIN — TRIAMCINOLONE ACETONIDE 40 MG: 40 INJECTION, SUSPENSION INTRA-ARTICULAR; INTRAMUSCULAR at 13:16

## 2023-08-24 RX ADMIN — LIDOCAINE HYDROCHLORIDE 4 ML: 10 INJECTION, SOLUTION EPIDURAL; INFILTRATION; INTRACAUDAL; PERINEURAL at 13:16

## 2023-08-24 NOTE — PROGRESS NOTES
Procedure   Large Joint Arthrocentesis: L knee  Date/Time: 8/24/2023 1:16 PM  Consent given by: patient  Site marked: site marked  Timeout: Immediately prior to procedure a time out was called to verify the correct patient, procedure, equipment, support staff and site/side marked as required   Supporting Documentation  Indications: pain   Procedure Details  Location: knee - L knee  Preparation: Patient was prepped and draped in the usual sterile fashion  Needle size: 25 G  Approach: anterolateral  Medications administered: 4 mL lidocaine PF 1% 1 %; 40 mg triamcinolone acetonide 40 MG/ML  Patient tolerance: patient tolerated the procedure well with no immediate complications
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
## 2023-08-25 ENCOUNTER — PATIENT ROUNDING (BHMG ONLY) (OUTPATIENT)
Dept: SPORTS MEDICINE | Facility: CLINIC | Age: 43
End: 2023-08-25
Payer: MEDICAID

## 2023-08-28 ENCOUNTER — TREATMENT (OUTPATIENT)
Dept: PHYSICAL THERAPY | Facility: CLINIC | Age: 43
End: 2023-08-28
Payer: MEDICAID

## 2023-08-28 ENCOUNTER — TELEPHONE (OUTPATIENT)
Dept: PODIATRY | Facility: CLINIC | Age: 43
End: 2023-08-28

## 2023-08-28 DIAGNOSIS — Z98.1 HISTORY OF ANKLE FUSION: ICD-10-CM

## 2023-08-28 DIAGNOSIS — S92.324K: Primary | ICD-10-CM

## 2023-08-28 DIAGNOSIS — R26.9 GAIT DISTURBANCE: ICD-10-CM

## 2023-08-28 DIAGNOSIS — M79.671 RIGHT FOOT PAIN: ICD-10-CM

## 2023-08-28 NOTE — TELEPHONE ENCOUNTER
Hub staff attempted to follow warm transfer process and was unsuccessful     Caller: Still, Vidya MENDOZA    Relationship to patient: Self    Best call back number: [phone]    Patient is needing: PATIENT NEEDING TO RESCHEDULE HER APPOINTMENT TODAY AND SHE WOULD LIKE TO KNOW IF POSSIBLE TO SEE HER PRIOR TO OCTOBER. PLEASE ADVISE.

## 2023-08-28 NOTE — PROGRESS NOTES
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 Pt. States this has effected her weightbearing tolerance in her L LE. Pt. States her L knee is now in a brace and her MD told her it is thinning out where she has been compensating throughout the time where her R ankle has been in a boot.    Objective   See Exercise, Manual, and Modality Logs for complete treatment.     Assessment/Plan  Pt. Tolerates treatment really well this visit and her current set back is her L knee causing pain. Exercises will be added to address B LE strength and stability for best results for return to gait and stability.    STG to be met in 3 wk:  - PT to assess SLS in 3-4 visits. - MET  - Pt to tolerate NuStep x10 min. - MET  - Pt to be independent with HEP in 3 weeks. - NOT MET  LTG to be met in 12 wk:  - Improve FAAM score to 40% impairment or less (76% impairment initially) - NOT MET  - Increase R ankle strength to 4+/5 in all directions in order to walk in her yard safely.- NOT MET  - Pt to negotiate stairs with reciprocal pattern and 1 handrail to negotiate in community. - NOT MET  - Pt to ambulate in clinic without ankle brace with continuous pattern and R heel strike and toe off in order to walk in her home to complete housework. - NOT MET    Progress strengthening /stabilization /functional activity           Timed:         Manual Therapy:    10     mins  81824;     Therapeutic Exercise:    15     mins  47021;     Neuromuscular Annamarie:    15    mins  [id number];      Timed Treatment:   40   mins   Total Treatment:      40   mins    Xiomara Osborne PTA  Physical Therapist Assistant License #[id number]

## 2023-08-30 RX ORDER — ATORVASTATIN CALCIUM 40 MG/1
40 TABLET, FILM COATED ORAL
Qty: 90 TABLET | Refills: 3 | Status: SHIPPED | OUTPATIENT
Start: 2023-08-30 | End: 2023-09-01

## 2023-08-31 ENCOUNTER — HOSPITAL ENCOUNTER (EMERGENCY)
Facility: HOSPITAL | Age: 43
Discharge: HOME | End: 2023-08-31
Attending: EMERGENCY MEDICINE
Payer: MEDICAID

## 2023-08-31 ENCOUNTER — TELEPHONE (OUTPATIENT)
Dept: CARDIOLOGY | Facility: CLINIC | Age: 43
End: 2023-08-31

## 2023-08-31 ENCOUNTER — APPOINTMENT (OUTPATIENT)
Dept: GENERAL RADIOLOGY | Facility: HOSPITAL | Age: 43
End: 2023-08-31
Payer: MEDICAID

## 2023-08-31 VITALS
SYSTOLIC BLOOD PRESSURE: 158 MMHG | OXYGEN SATURATION: 99 % | HEART RATE: 83 BPM | HEIGHT: 64 IN | DIASTOLIC BLOOD PRESSURE: 97 MMHG | BODY MASS INDEX: 37.18 KG/M2 | RESPIRATION RATE: 18 BRPM | TEMPERATURE: 98.2 F | WEIGHT: 217.8 LBS

## 2023-08-31 DIAGNOSIS — R07.9 CHEST PAIN, UNSPECIFIED TYPE: Primary | ICD-10-CM

## 2023-08-31 LAB
ALBUMIN SERPL-MCNC: 4.2 G/DL (ref 3.5–5.2)
ALBUMIN/GLOB SERPL: 1.6 G/DL
ALP SERPL-CCNC: 108 U/L (ref 39–117)
ALT SERPL W P-5'-P-CCNC: 27 U/L (ref 1–33)
ANION GAP SERPL CALCULATED.3IONS-SCNC: 12 MMOL/L (ref 5–15)
AST SERPL-CCNC: 21 U/L (ref 1–32)
BASOPHILS # BLD AUTO: 0.2 10*3/MM3 (ref 0–0.2)
BASOPHILS NFR BLD AUTO: 1.1 % (ref 0–1.5)
BILIRUB SERPL-MCNC: 0.3 MG/DL (ref 0–1.2)
BUN SERPL-MCNC: 9 MG/DL (ref 6–20)
BUN/CREAT SERPL: 14.1 (ref 7–25)
CALCIUM SPEC-SCNC: 9.6 MG/DL (ref 8.6–10.5)
CHLORIDE SERPL-SCNC: 102 MMOL/L (ref 98–107)
CO2 SERPL-SCNC: 25 MMOL/L (ref 22–29)
CREAT SERPL-MCNC: 0.64 MG/DL (ref 0.57–1)
D DIMER PPP FEU-MCNC: 0.21 MG/L (FEU) (ref 0–0.5)
DEPRECATED RDW RBC AUTO: 43.3 FL (ref 37–54)
EGFRCR SERPLBLD CKD-EPI 2021: 113.3 ML/MIN/1.73
EOSINOPHIL # BLD AUTO: 0.1 10*3/MM3 (ref 0–0.4)
EOSINOPHIL NFR BLD AUTO: 0.9 % (ref 0.3–6.2)
ERYTHROCYTE [DISTWIDTH] IN BLOOD BY AUTOMATED COUNT: 14.6 % (ref 12.3–15.4)
GLOBULIN UR ELPH-MCNC: 2.6 GM/DL
GLUCOSE SERPL-MCNC: 106 MG/DL (ref 65–99)
HCT VFR BLD AUTO: 44.1 % (ref 34–46.6)
HGB BLD-MCNC: 15.2 G/DL (ref 12–15.9)
LYMPHOCYTES # BLD AUTO: 3.8 10*3/MM3 (ref 0.7–3.1)
LYMPHOCYTES NFR BLD AUTO: 23 % (ref 19.6–45.3)
MCH RBC QN AUTO: 29.5 PG (ref 26.6–33)
MCHC RBC AUTO-ENTMCNC: 34.5 G/DL (ref 31.5–35.7)
MCV RBC AUTO: 85.4 FL (ref 79–97)
MONOCYTES # BLD AUTO: 1 10*3/MM3 (ref 0.1–0.9)
MONOCYTES NFR BLD AUTO: 6.2 % (ref 5–12)
NEUTROPHILS NFR BLD AUTO: 11.2 10*3/MM3 (ref 1.7–7)
NEUTROPHILS NFR BLD AUTO: 68.8 % (ref 42.7–76)
NRBC BLD AUTO-RTO: 0.1 /100 WBC (ref 0–0.2)
PLATELET # BLD AUTO: 351 10*3/MM3 (ref 140–450)
PMV BLD AUTO: 8.3 FL (ref 6–12)
POTASSIUM SERPL-SCNC: 3.4 MMOL/L (ref 3.5–5.2)
PROT SERPL-MCNC: 6.8 G/DL (ref 6–8.5)
RBC # BLD AUTO: 5.16 10*6/MM3 (ref 3.77–5.28)
SODIUM SERPL-SCNC: 139 MMOL/L (ref 136–145)
TROPONIN T SERPL HS-MCNC: <6 NG/L
WBC NRBC COR # BLD: 16.3 10*3/MM3 (ref 3.4–10.8)

## 2023-08-31 PROCEDURE — 80053 COMPREHEN METABOLIC PANEL: CPT | Performed by: EMERGENCY MEDICINE

## 2023-08-31 PROCEDURE — 93005 ELECTROCARDIOGRAM TRACING: CPT

## 2023-08-31 PROCEDURE — 99284 EMERGENCY DEPT VISIT MOD MDM: CPT

## 2023-08-31 PROCEDURE — 85379 FIBRIN DEGRADATION QUANT: CPT | Performed by: EMERGENCY MEDICINE

## 2023-08-31 PROCEDURE — 85025 COMPLETE CBC W/AUTO DIFF WBC: CPT | Performed by: EMERGENCY MEDICINE

## 2023-08-31 PROCEDURE — 84484 ASSAY OF TROPONIN QUANT: CPT | Performed by: EMERGENCY MEDICINE

## 2023-08-31 PROCEDURE — 71045 X-RAY EXAM CHEST 1 VIEW: CPT

## 2023-08-31 RX ORDER — ASPIRIN 81 MG/1
243 TABLET, CHEWABLE ORAL ONCE
Status: COMPLETED | OUTPATIENT
Start: 2023-08-31 | End: 2023-08-31

## 2023-08-31 RX ORDER — SODIUM CHLORIDE 0.9 % (FLUSH) 0.9 %
10 SYRINGE (ML) INJECTION AS NEEDED
Status: DISCONTINUED | OUTPATIENT
Start: 2023-08-31 | End: 2023-08-31 | Stop reason: HOSPADM

## 2023-08-31 RX ADMIN — ASPIRIN 81 MG CHEWABLE TABLET 243 MG: 81 TABLET CHEWABLE at 13:33
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
## 2023-08-31 NOTE — TELEPHONE ENCOUNTER
Caller: Vidya Becerra    Relationship: Self    Best call back number: [phone]    What is the best time to reach you: ANYTIME    Who are you requesting to speak with (clinical staff, provider,  specific staff member):CLINICAL        What was the call regarding: PT WENT TO PCP TODAY AND WAS SENT TO ED WITH CHEST PAINS & ELEVATED BP.  SHE IS CURRENTLY AT ED AND THEY  PERFORMED EKG - HER EKG LOOKED THE SAME AS 2021.  THEY SAID SHE NEEDED A STRESS TEST AND WANTED TO ADMIT HER, SHE DOES NOT WANT TO BE ADMITTED.  COULD YOU ORDER STRESS TEST FOR HER?PLEASE CALL PT AND ADVISE     Is it okay if the provider responds through MyChart: NO

## 2023-09-01 LAB
QT INTERVAL: 363 MS
QTC INTERVAL: 450 MS

## 2023-09-01 RX ORDER — ATORVASTATIN CALCIUM 40 MG/1
TABLET, FILM COATED ORAL
Qty: 90 TABLET | Refills: 1 | Status: ON HOLD | OUTPATIENT
Start: 2023-09-01

## 2023-09-01 NOTE — TELEPHONE ENCOUNTER
----- Message from GENEVA Roldan sent at 9/1/2023  3:38 PM EDT -----  Regarding: RE:  Ok.  Thank you.  ----- Message -----  From: Melissa Harvey MA  Sent: 9/1/2023   3:30 PM EDT  To: GENEVA Roldan  Subject: RE:                                              I spoke with pt she is agreeable to have the heart cath done if you want to place the orders. She is aware it will be on next week before orders are placed and the  will call her.   Thank you   ----- Message -----  From: Prachi López APRN  Sent: 9/1/2023   9:29 AM EDT  To: Melissa Harvey MA  Subject: RE:                                              She had a negative nuclear about 3 years ago.  With her multiple risk factors and recurring chest pain, I would recommend a heart cath for definitive diagnosis.  If she's agreeable, I can place the order Monday.  She should go back to the ER for admission if she has any further pain.  ----- Message -----  From: Melissa Harvey MA  Sent: 8/31/2023   4:25 PM EDT  To: GENEVA Roldan    Pt called and was recently seen by pcp for chest pain, and increased bp. She was sent to the er, they wanted to admit  and do a stress test. Pt did not want to stay in the er, so she would like to know if you would order a stress test. Pcp increased her hctz from 12.5 to 25 and gave her additional medication, which she will call with as soon as she can.

## 2023-09-03 ENCOUNTER — HOSPITAL ENCOUNTER (OUTPATIENT)
Facility: HOSPITAL | Age: 43
Setting detail: OBSERVATION
Discharge: HOME | End: 2023-09-05
Attending: EMERGENCY MEDICINE | Admitting: HOSPITALIST
Payer: MEDICAID

## 2023-09-03 DIAGNOSIS — E78.2 MIXED HYPERLIPIDEMIA: ICD-10-CM

## 2023-09-03 DIAGNOSIS — I20.0 UNSTABLE ANGINA: Primary | ICD-10-CM

## 2023-09-03 DIAGNOSIS — R07.9 CHEST PAIN, UNSPECIFIED TYPE: ICD-10-CM

## 2023-09-03 DIAGNOSIS — I10 ESSENTIAL HYPERTENSION: ICD-10-CM

## 2023-09-03 DIAGNOSIS — D72.829 LEUKOCYTOSIS, UNSPECIFIED TYPE: ICD-10-CM

## 2023-09-03 PROCEDURE — 93005 ELECTROCARDIOGRAM TRACING: CPT

## 2023-09-03 PROCEDURE — 93005 ELECTROCARDIOGRAM TRACING: CPT | Performed by: EMERGENCY MEDICINE

## 2023-09-03 PROCEDURE — 99284 EMERGENCY DEPT VISIT MOD MDM: CPT

## 2023-09-03 NOTE — Clinical Note
The left DP pulse is +2. The right DP pulse is detected w/ doppler. The PT pulses are +1 bilaterally.

## 2023-09-03 NOTE — Clinical Note
Hemostasis started on the right femoral artery. StarClose SE was used in achieving hemostasis. Closure device deployed in the vessel. Hemostasis achieved successfully.

## 2023-09-03 NOTE — Clinical Note
A 6 fr sheath was  inserted using micropuncture technique into the right femoral artery. Sheath insertion not delayed.

## 2023-09-03 NOTE — Clinical Note
Level of Care: Telemetry [5]   Admitting Physician: CARMEN VILLARREAL [[id number]]   Attending Physician: CARMEN VILLARREAL [[id number]]

## 2023-09-04 ENCOUNTER — APPOINTMENT (OUTPATIENT)
Dept: GENERAL RADIOLOGY | Facility: HOSPITAL | Age: 43
End: 2023-09-04
Payer: MEDICAID

## 2023-09-04 PROBLEM — D72.829 LEUKOCYTOSIS: Status: ACTIVE | Noted: 2023-09-04

## 2023-09-04 PROBLEM — K21.9 GERD WITHOUT ESOPHAGITIS: Status: ACTIVE | Noted: 2023-09-04

## 2023-09-04 PROBLEM — R07.9 CHEST PAIN: Status: ACTIVE | Noted: 2023-09-04

## 2023-09-04 LAB
ALBUMIN SERPL-MCNC: 4.1 G/DL (ref 3.5–5.2)
ALBUMIN/GLOB SERPL: 1.5 G/DL
ALP SERPL-CCNC: 116 U/L (ref 39–117)
ALT SERPL W P-5'-P-CCNC: 28 U/L (ref 1–33)
ANION GAP SERPL CALCULATED.3IONS-SCNC: 12 MMOL/L (ref 5–15)
AST SERPL-CCNC: 19 U/L (ref 1–32)
BASOPHILS # BLD AUTO: 0.1 10*3/MM3 (ref 0–0.2)
BASOPHILS NFR BLD AUTO: 0.6 % (ref 0–1.5)
BILIRUB SERPL-MCNC: 0.3 MG/DL (ref 0–1.2)
BILIRUB UR QL STRIP: NEGATIVE
BUN SERPL-MCNC: 9 MG/DL (ref 6–20)
BUN/CREAT SERPL: 12.9 (ref 7–25)
CALCIUM SPEC-SCNC: 9.9 MG/DL (ref 8.6–10.5)
CHLORIDE SERPL-SCNC: 102 MMOL/L (ref 98–107)
CLARITY UR: CLEAR
CO2 SERPL-SCNC: 25 MMOL/L (ref 22–29)
COLOR UR: YELLOW
CREAT SERPL-MCNC: 0.7 MG/DL (ref 0.57–1)
DEPRECATED RDW RBC AUTO: 43.3 FL (ref 37–54)
EGFRCR SERPLBLD CKD-EPI 2021: 110.9 ML/MIN/1.73
EOSINOPHIL # BLD AUTO: 0.2 10*3/MM3 (ref 0–0.4)
EOSINOPHIL NFR BLD AUTO: 0.8 % (ref 0.3–6.2)
ERYTHROCYTE [DISTWIDTH] IN BLOOD BY AUTOMATED COUNT: 14.5 % (ref 12.3–15.4)
GEN 5 2HR TROPONIN T REFLEX: <6 NG/L
GLOBULIN UR ELPH-MCNC: 2.8 GM/DL
GLUCOSE SERPL-MCNC: 127 MG/DL (ref 65–99)
GLUCOSE UR STRIP-MCNC: NEGATIVE MG/DL
HCT VFR BLD AUTO: 46.8 % (ref 34–46.6)
HGB BLD-MCNC: 15.7 G/DL (ref 12–15.9)
HGB UR QL STRIP.AUTO: NEGATIVE
KETONES UR QL STRIP: NEGATIVE
LEUKOCYTE ESTERASE UR QL STRIP.AUTO: NEGATIVE
LYMPHOCYTES # BLD AUTO: 4.1 10*3/MM3 (ref 0.7–3.1)
LYMPHOCYTES NFR BLD AUTO: 20.3 % (ref 19.6–45.3)
MCH RBC QN AUTO: 29 PG (ref 26.6–33)
MCHC RBC AUTO-ENTMCNC: 33.6 G/DL (ref 31.5–35.7)
MCV RBC AUTO: 86.2 FL (ref 79–97)
MONOCYTES # BLD AUTO: 1.2 10*3/MM3 (ref 0.1–0.9)
MONOCYTES NFR BLD AUTO: 5.7 % (ref 5–12)
NEUTROPHILS NFR BLD AUTO: 14.8 10*3/MM3 (ref 1.7–7)
NEUTROPHILS NFR BLD AUTO: 72.6 % (ref 42.7–76)
NITRITE UR QL STRIP: NEGATIVE
NRBC BLD AUTO-RTO: 0 /100 WBC (ref 0–0.2)
NT-PROBNP SERPL-MCNC: <36 PG/ML (ref 0–450)
PH UR STRIP.AUTO: 5.5 [PH] (ref 5–8)
PLATELET # BLD AUTO: 332 10*3/MM3 (ref 140–450)
PMV BLD AUTO: 8.1 FL (ref 6–12)
POTASSIUM SERPL-SCNC: 3.6 MMOL/L (ref 3.5–5.2)
PROT SERPL-MCNC: 6.9 G/DL (ref 6–8.5)
PROT UR QL STRIP: NEGATIVE
RBC # BLD AUTO: 5.42 10*6/MM3 (ref 3.77–5.28)
SODIUM SERPL-SCNC: 139 MMOL/L (ref 136–145)
SP GR UR STRIP: <=1.005 (ref 1–1.03)
TROPONIN T DELTA: NORMAL
TROPONIN T SERPL HS-MCNC: 7 NG/L
UROBILINOGEN UR QL STRIP: NORMAL
WBC NRBC COR # BLD: 20.3 10*3/MM3 (ref 3.4–10.8)

## 2023-09-04 PROCEDURE — 85025 COMPLETE CBC W/AUTO DIFF WBC: CPT | Performed by: NURSE PRACTITIONER

## 2023-09-04 PROCEDURE — 71045 X-RAY EXAM CHEST 1 VIEW: CPT

## 2023-09-04 PROCEDURE — G0378 HOSPITAL OBSERVATION PER HR: HCPCS

## 2023-09-04 PROCEDURE — 99214 OFFICE O/P EST MOD 30 MIN: CPT | Performed by: NURSE PRACTITIONER

## 2023-09-04 PROCEDURE — 83880 ASSAY OF NATRIURETIC PEPTIDE: CPT | Performed by: NURSE PRACTITIONER

## 2023-09-04 PROCEDURE — 81003 URINALYSIS AUTO W/O SCOPE: CPT | Performed by: NURSE PRACTITIONER

## 2023-09-04 PROCEDURE — 84484 ASSAY OF TROPONIN QUANT: CPT | Performed by: NURSE PRACTITIONER

## 2023-09-04 PROCEDURE — 80053 COMPREHEN METABOLIC PANEL: CPT | Performed by: NURSE PRACTITIONER

## 2023-09-04 RX ORDER — AMLODIPINE BESYLATE 5 MG/1
5 TABLET ORAL DAILY
COMMUNITY

## 2023-09-04 RX ORDER — LISINOPRIL AND HYDROCHLOROTHIAZIDE 25; 20 MG/1; MG/1
1 TABLET ORAL DAILY
COMMUNITY

## 2023-09-04 RX ORDER — ATORVASTATIN CALCIUM 40 MG/1
40 TABLET, FILM COATED ORAL NIGHTLY
Status: DISCONTINUED | OUTPATIENT
Start: 2023-09-04 | End: 2023-09-05 | Stop reason: HOSPADM

## 2023-09-04 RX ORDER — OXYCODONE HYDROCHLORIDE 5 MG/1
7.5 TABLET ORAL EVERY 8 HOURS PRN
Status: DISCONTINUED | OUTPATIENT
Start: 2023-09-04 | End: 2023-09-05 | Stop reason: HOSPADM

## 2023-09-04 RX ORDER — AMLODIPINE BESYLATE 5 MG/1
5 TABLET ORAL DAILY
Status: DISCONTINUED | OUTPATIENT
Start: 2023-09-04 | End: 2023-09-05 | Stop reason: HOSPADM

## 2023-09-04 RX ORDER — GABAPENTIN 400 MG/1
800 CAPSULE ORAL EVERY 12 HOURS SCHEDULED
Status: DISCONTINUED | OUTPATIENT
Start: 2023-09-04 | End: 2023-09-05 | Stop reason: HOSPADM

## 2023-09-04 RX ORDER — BACLOFEN 10 MG/1
10 TABLET ORAL NIGHTLY PRN
Status: DISCONTINUED | OUTPATIENT
Start: 2023-09-04 | End: 2023-09-05 | Stop reason: HOSPADM

## 2023-09-04 RX ORDER — SODIUM CHLORIDE 0.9 % (FLUSH) 0.9 %
10 SYRINGE (ML) INJECTION AS NEEDED
Status: DISCONTINUED | OUTPATIENT
Start: 2023-09-04 | End: 2023-09-05 | Stop reason: HOSPADM

## 2023-09-04 RX ORDER — LISINOPRIL 5 MG/1
10 TABLET ORAL
Status: DISCONTINUED | OUTPATIENT
Start: 2023-09-04 | End: 2023-09-05 | Stop reason: HOSPADM

## 2023-09-04 RX ORDER — ALBUTEROL SULFATE 2.5 MG/3ML
2.5 SOLUTION RESPIRATORY (INHALATION) EVERY 6 HOURS PRN
Status: DISCONTINUED | OUTPATIENT
Start: 2023-09-04 | End: 2023-09-05 | Stop reason: HOSPADM

## 2023-09-04 RX ORDER — HYDROCHLOROTHIAZIDE 12.5 MG/1
12.5 TABLET ORAL
Status: DISCONTINUED | OUTPATIENT
Start: 2023-09-04 | End: 2023-09-05 | Stop reason: HOSPADM

## 2023-09-04 RX ORDER — ASPIRIN 81 MG/1
81 TABLET ORAL DAILY
Status: DISCONTINUED | OUTPATIENT
Start: 2023-09-04 | End: 2023-09-05 | Stop reason: HOSPADM

## 2023-09-04 RX ORDER — ASPIRIN 81 MG/1
324 TABLET, CHEWABLE ORAL ONCE
Status: COMPLETED | OUTPATIENT
Start: 2023-09-04 | End: 2023-09-04

## 2023-09-04 RX ORDER — ESCITALOPRAM OXALATE 10 MG/1
10 TABLET ORAL DAILY
Status: DISCONTINUED | OUTPATIENT
Start: 2023-09-04 | End: 2023-09-04

## 2023-09-04 RX ORDER — PANTOPRAZOLE SODIUM 40 MG/1
40 TABLET, DELAYED RELEASE ORAL NIGHTLY
Status: DISCONTINUED | OUTPATIENT
Start: 2023-09-04 | End: 2023-09-05 | Stop reason: HOSPADM

## 2023-09-04 RX ORDER — MELOXICAM 15 MG/1
15 TABLET ORAL DAILY
Status: DISCONTINUED | OUTPATIENT
Start: 2023-09-04 | End: 2023-09-05 | Stop reason: HOSPADM

## 2023-09-04 RX ADMIN — OXYCODONE HYDROCHLORIDE 7.5 MG: 5 TABLET ORAL at 15:20

## 2023-09-04 RX ADMIN — PANTOPRAZOLE SODIUM 40 MG: 40 TABLET, DELAYED RELEASE ORAL at 21:23

## 2023-09-04 RX ADMIN — ASPIRIN 81 MG CHEWABLE TABLET 324 MG: 81 TABLET CHEWABLE at 02:00

## 2023-09-04 RX ADMIN — GABAPENTIN 800 MG: 400 CAPSULE ORAL at 21:23

## 2023-09-04 RX ADMIN — ATORVASTATIN CALCIUM 40 MG: 40 TABLET, FILM COATED ORAL at 21:23

## 2023-09-04 RX ADMIN — HYDROCHLOROTHIAZIDE 12.5 MG: 12.5 TABLET ORAL at 15:19

## 2023-09-04 RX ADMIN — ASPIRIN 81 MG: 81 TABLET, COATED ORAL at 15:19

## 2023-09-04 RX ADMIN — LISINOPRIL 10 MG: 5 TABLET ORAL at 15:19

## 2023-09-04 RX ADMIN — AMLODIPINE BESYLATE 5 MG: 5 TABLET ORAL at 15:20

## 2023-09-04 RX ADMIN — MELOXICAM 15 MG: 15 TABLET ORAL at 15:20

## 2023-09-04 RX ADMIN — OXYCODONE HYDROCHLORIDE 7.5 MG: 5 TABLET ORAL at 23:33
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
## 2023-09-04 NOTE — PROGRESS NOTES
[facility] Medicine Services   Daily Progress Note    Patient Name: Vidya Becerra  : 1980  MRN: [id number]  Primary Care Physician:  Ana Rosa Doran APRN  Date of admission: 9/3/2023  Date of service 2023.      Subjective      Chief Complaint: Patient was admitted with chest pain.  Pain is better now.  She she has elevated white count.  No fever.    Patient Reports mild chest pain.  No fever no cough no dizziness no tightness.  No shortness of breath.    ROS A 12 point review of system was done and was negative except as mentioned above      Objective      Vitals:   Temp:  [97.8 °F (36.6 °C)-98.4 °F (36.9 °C)] 97.8 °F (36.6 °C)  Heart Rate:  [] 68  Resp:  [17-23] 18  BP: (117-149)/(73-88) 130/81    Physical Exam  Constitutional:       Appearance: Normal appearance.   HENT:      Head: Normocephalic and atraumatic.      Nose: Nose normal.      Mouth/Throat:      Mouth: Mucous membranes are moist.   Cardiovascular:      Rate and Rhythm: Normal rate.   Pulmonary:      Effort: Pulmonary effort is normal. No respiratory distress.      Breath sounds: Normal breath sounds. No stridor. No wheezing, rhonchi or rales.   Chest:      Chest wall: No tenderness.   Abdominal:      General: Abdomen is flat. There is no distension.      Palpations: Abdomen is soft. There is no mass.      Tenderness: There is no abdominal tenderness.   Neurological:      General: No focal deficit present.      Mental Status: She is alert.          Result Review    Result Review:  I have personally reviewed the results from the time of this admission to 2023 13:50 EDT and agree with these findings:  []  Laboratory  []  Microbiology  []  Radiology  []  EKG/Telemetry   []  Cardiology/Vascular   []  Pathology  []  Old records  []  Other:  Most notable findings include:     Wounds (last 24 hours)       LDA Wound       Row Name               [REMOVED] Wound 23 1230 Right anterior foot Incision    Wound - Properties  Group Placement Date: 05/01/23  -MT Placement Time: 1230  -MT Present on Hospital Admission: N  -MT Side: Right  -MT Orientation: anterior  -MT Location: foot  -MT Primary Wound Type: Incision  -MT Removal Date: 09/04/23  -AS Removal Time: 0047  -AS Wound Outcome: Healed  -AS    Retired Wound - Properties Group Placement Date: 05/01/23  -MT Placement Time: 1230  -MT Present on Hospital Admission: N  -MT Side: Right  -MT Orientation: anterior  -MT Location: foot  -MT Primary Wound Type: Incision  -MT Removal Date: 09/04/23  -AS Removal Time: 0047  -AS Wound Outcome: Healed  -AS    Retired Wound - Properties Group Date first assessed: 05/01/23  -MT Time first assessed: 1230  -MT Present on Hospital Admission: N  -MT Side: Right  -MT Location: foot  -MT Primary Wound Type: Incision  -MT Resolution Date: 09/04/23  -AS Resolution Time: 0047  -AS Wound Outcome: Healed  -AS              User Key  (r) = Recorded By, (t) = Taken By, (c) = Cosigned By      Initials Name Provider Type    AS Patsy Alfaro, RN Registered Nurse    MT Gage Garcia RN Registered Nurse                      Assessment & Plan      Brief Patient Summary:  Vidya Becerra is a 43 y.o. female who was has past medical history of hypertension, hyperlipidemia, obesity, tobacco use who presented to [facility] on 9/3/2023 complaining of intermittent chest pain which radiates to her back                 Active Hospital Problems:  Active Hospital Problems    Diagnosis     **Chest pain     GERD without esophagitis     Leukocytosis     Mixed hyperlipidemia     Chest pain, atypical     Essential hypertension     Class 1 obesity due to excess calories without serious comorbidity with body mass index (BMI) of 32.0 to 32.9 in adult     Tobacco dependence      Plan:   Chest pain, troponin and proBNP negative, EKG no acute ST elevation or depression read by me, continuous cardiac monitoring, aspirin 325 in ED.  Cardiology consulted.  Cardiology is  recommending cardiac cath    Leukocytosis, afebrile denies fever chest x-ray negative, urinalysis pending continue to monitor.  Follow-up WBC.     GERD without esophagitis, was on home PPI reorder pending verification     Hyperlipidemia, was on home statin reorder pending verification     hypertension, was on home lisinopril hydrochlorothiazide and amlodipine reorder pending verification monitor BP Will add as needed antihypertensives if needed     Obesity BMI 37.39 lifestyle management education     Tobacco dependence, encourage cessation declined nicotine patch.  Patient to quit smoking.     Anxiety depression, was on home Lexapro reorder pending verification         DVT prophylaxis:  No DVT prophylaxis order currently exists.    CODE STATUS:         Disposition:  I expect patient to be discharged 1 day          Electronically signed by Eric Wright MD, 09/04/23, 13:50 EDT.  [facility] Hospitalist Team
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
## 2023-09-05 VITALS
OXYGEN SATURATION: 93 % | SYSTOLIC BLOOD PRESSURE: 128 MMHG | HEIGHT: 64 IN | BODY MASS INDEX: 36.84 KG/M2 | DIASTOLIC BLOOD PRESSURE: 62 MMHG | RESPIRATION RATE: 10 BRPM | HEART RATE: 72 BPM | WEIGHT: 215.8 LBS | TEMPERATURE: 97.9 F

## 2023-09-05 PROBLEM — I20.0 UNSTABLE ANGINA: Status: ACTIVE | Noted: 2023-09-03

## 2023-09-05 LAB
ANION GAP SERPL CALCULATED.3IONS-SCNC: 13 MMOL/L (ref 5–15)
BASOPHILS # BLD AUTO: 0.3 10*3/MM3 (ref 0–0.2)
BASOPHILS NFR BLD AUTO: 2 % (ref 0–1.5)
BUN SERPL-MCNC: 5 MG/DL (ref 6–20)
BUN/CREAT SERPL: 11.9 (ref 7–25)
CALCIUM SPEC-SCNC: 8.5 MG/DL (ref 8.6–10.5)
CHLORIDE SERPL-SCNC: 99 MMOL/L (ref 98–107)
CO2 SERPL-SCNC: 27 MMOL/L (ref 22–29)
CREAT SERPL-MCNC: 0.42 MG/DL (ref 0.57–1)
DEPRECATED RDW RBC AUTO: 45.9 FL (ref 37–54)
EGFRCR SERPLBLD CKD-EPI 2021: 125.4 ML/MIN/1.73
EOSINOPHIL # BLD AUTO: 0.2 10*3/MM3 (ref 0–0.4)
EOSINOPHIL NFR BLD AUTO: 1.3 % (ref 0.3–6.2)
ERYTHROCYTE [DISTWIDTH] IN BLOOD BY AUTOMATED COUNT: 14.9 % (ref 12.3–15.4)
GLUCOSE SERPL-MCNC: 115 MG/DL (ref 65–99)
HCT VFR BLD AUTO: 46.6 % (ref 34–46.6)
HGB BLD-MCNC: 15.4 G/DL (ref 12–15.9)
LYMPHOCYTES # BLD AUTO: 4 10*3/MM3 (ref 0.7–3.1)
LYMPHOCYTES NFR BLD AUTO: 24.5 % (ref 19.6–45.3)
MCH RBC QN AUTO: 29.3 PG (ref 26.6–33)
MCHC RBC AUTO-ENTMCNC: 33.1 G/DL (ref 31.5–35.7)
MCV RBC AUTO: 88.6 FL (ref 79–97)
MONOCYTES # BLD AUTO: 1 10*3/MM3 (ref 0.1–0.9)
MONOCYTES NFR BLD AUTO: 6.2 % (ref 5–12)
NEUTROPHILS NFR BLD AUTO: 10.8 10*3/MM3 (ref 1.7–7)
NEUTROPHILS NFR BLD AUTO: 66 % (ref 42.7–76)
NRBC BLD AUTO-RTO: 0.2 /100 WBC (ref 0–0.2)
PLATELET # BLD AUTO: 326 10*3/MM3 (ref 140–450)
PMV BLD AUTO: 8.6 FL (ref 6–12)
POTASSIUM SERPL-SCNC: 3.7 MMOL/L (ref 3.5–5.2)
QT INTERVAL: 329 MS
QT INTERVAL: 438 MS
QTC INTERVAL: 411 MS
QTC INTERVAL: 463 MS
RBC # BLD AUTO: 5.26 10*6/MM3 (ref 3.77–5.28)
SODIUM SERPL-SCNC: 139 MMOL/L (ref 136–145)
WBC NRBC COR # BLD: 16.3 10*3/MM3 (ref 3.4–10.8)

## 2023-09-05 PROCEDURE — 36415 COLL VENOUS BLD VENIPUNCTURE: CPT | Performed by: NURSE PRACTITIONER

## 2023-09-05 PROCEDURE — C1894 INTRO/SHEATH, NON-LASER: HCPCS | Performed by: INTERNAL MEDICINE

## 2023-09-05 PROCEDURE — 25510000001 IOPAMIDOL PER 1 ML: Performed by: INTERNAL MEDICINE

## 2023-09-05 PROCEDURE — 99152 MOD SED SAME PHYS/QHP 5/>YRS: CPT | Performed by: INTERNAL MEDICINE

## 2023-09-05 PROCEDURE — 85025 COMPLETE CBC W/AUTO DIFF WBC: CPT | Performed by: NURSE PRACTITIONER

## 2023-09-05 PROCEDURE — 93005 ELECTROCARDIOGRAM TRACING: CPT | Performed by: INTERNAL MEDICINE

## 2023-09-05 PROCEDURE — 93458 L HRT ARTERY/VENTRICLE ANGIO: CPT | Performed by: INTERNAL MEDICINE

## 2023-09-05 PROCEDURE — G0378 HOSPITAL OBSERVATION PER HR: HCPCS

## 2023-09-05 PROCEDURE — C1760 CLOSURE DEV, VASC: HCPCS | Performed by: INTERNAL MEDICINE

## 2023-09-05 PROCEDURE — 25010000002 NITROGLYCERIN 5 MG/ML SOLUTION: Performed by: INTERNAL MEDICINE

## 2023-09-05 PROCEDURE — 25010000002 FENTANYL CITRATE (PF) 100 MCG/2ML SOLUTION: Performed by: INTERNAL MEDICINE

## 2023-09-05 PROCEDURE — 25010000002 MIDAZOLAM PER 1 MG: Performed by: INTERNAL MEDICINE

## 2023-09-05 PROCEDURE — 0 LIDOCAINE 1 % SOLUTION: Performed by: INTERNAL MEDICINE

## 2023-09-05 PROCEDURE — C1769 GUIDE WIRE: HCPCS | Performed by: INTERNAL MEDICINE

## 2023-09-05 PROCEDURE — 80048 BASIC METABOLIC PNL TOTAL CA: CPT | Performed by: NURSE PRACTITIONER

## 2023-09-05 PROCEDURE — 99153 MOD SED SAME PHYS/QHP EA: CPT | Performed by: INTERNAL MEDICINE

## 2023-09-05 RX ORDER — ONDANSETRON 2 MG/ML
4 INJECTION INTRAMUSCULAR; INTRAVENOUS EVERY 6 HOURS PRN
Status: DISCONTINUED | OUTPATIENT
Start: 2023-09-05 | End: 2023-09-05 | Stop reason: HOSPADM

## 2023-09-05 RX ORDER — NITROGLYCERIN 0.4 MG/1
0.4 TABLET SUBLINGUAL
Status: DISCONTINUED | OUTPATIENT
Start: 2023-09-05 | End: 2023-09-05 | Stop reason: HOSPADM

## 2023-09-05 RX ORDER — ALUMINA, MAGNESIA, AND SIMETHICONE 2400; 2400; 240 MG/30ML; MG/30ML; MG/30ML
15 SUSPENSION ORAL EVERY 6 HOURS PRN
Status: DISCONTINUED | OUTPATIENT
Start: 2023-09-05 | End: 2023-09-05 | Stop reason: HOSPADM

## 2023-09-05 RX ORDER — MIDAZOLAM HYDROCHLORIDE 1 MG/ML
INJECTION INTRAMUSCULAR; INTRAVENOUS
Status: DISCONTINUED | OUTPATIENT
Start: 2023-09-05 | End: 2023-09-05 | Stop reason: HOSPADM

## 2023-09-05 RX ORDER — ASPIRIN 81 MG/1
81 TABLET ORAL DAILY
Status: DISCONTINUED | OUTPATIENT
Start: 2023-09-05 | End: 2023-09-05 | Stop reason: SDUPTHER

## 2023-09-05 RX ORDER — ONDANSETRON 4 MG/1
4 TABLET, FILM COATED ORAL EVERY 6 HOURS PRN
Status: DISCONTINUED | OUTPATIENT
Start: 2023-09-05 | End: 2023-09-05 | Stop reason: HOSPADM

## 2023-09-05 RX ORDER — SODIUM CHLORIDE 9 MG/ML
30 INJECTION, SOLUTION INTRAVENOUS CONTINUOUS
Status: DISCONTINUED | OUTPATIENT
Start: 2023-09-05 | End: 2023-09-05 | Stop reason: HOSPADM

## 2023-09-05 RX ORDER — NITROGLYCERIN 5 MG/ML
INJECTION, SOLUTION INTRAVENOUS
Status: DISCONTINUED | OUTPATIENT
Start: 2023-09-05 | End: 2023-09-05 | Stop reason: HOSPADM

## 2023-09-05 RX ORDER — FENTANYL CITRATE 50 UG/ML
INJECTION, SOLUTION INTRAMUSCULAR; INTRAVENOUS
Status: DISCONTINUED | OUTPATIENT
Start: 2023-09-05 | End: 2023-09-05 | Stop reason: HOSPADM

## 2023-09-05 RX ORDER — LIDOCAINE HYDROCHLORIDE 10 MG/ML
INJECTION, SOLUTION INFILTRATION; PERINEURAL
Status: DISCONTINUED | OUTPATIENT
Start: 2023-09-05 | End: 2023-09-05 | Stop reason: HOSPADM

## 2023-09-05 RX ORDER — DIPHENHYDRAMINE HCL 25 MG
25 CAPSULE ORAL EVERY 6 HOURS PRN
Status: DISCONTINUED | OUTPATIENT
Start: 2023-09-05 | End: 2023-09-05 | Stop reason: HOSPADM

## 2023-09-05 RX ORDER — NICOTINE 21 MG/24HR
1 PATCH, TRANSDERMAL 24 HOURS TRANSDERMAL DAILY PRN
Status: DISCONTINUED | OUTPATIENT
Start: 2023-09-05 | End: 2023-09-05 | Stop reason: HOSPADM

## 2023-09-05 RX ORDER — ACETAMINOPHEN 325 MG/1
650 TABLET ORAL EVERY 4 HOURS PRN
Status: DISCONTINUED | OUTPATIENT
Start: 2023-09-05 | End: 2023-09-05 | Stop reason: HOSPADM

## 2023-09-05 RX ADMIN — ASPIRIN 81 MG: 81 TABLET, COATED ORAL at 08:04

## 2023-09-05 RX ADMIN — Medication 10 ML: at 08:04

## 2023-09-05 RX ADMIN — AMLODIPINE BESYLATE 5 MG: 5 TABLET ORAL at 08:04

## 2023-09-05 NOTE — CASE MANAGEMENT/SOCIAL WORK
Social Work Assessment  [facility]     Patient Name: Vidya Becerra  MRN: [id number]  Today's Date: 9/5/2023    Admit Date: 9/3/2023     Demographic Summary       Row Name 09/05/23 1323       General Information    Reason for Consult community resources    General Information Comments SW was notified of financial need/help with utilities. SW attempted to meet with pt, but pt JESSIKA.             Ting Iqbal MSW, LSW  Medical Social Worker  Ph [phone]  Fax [phone]  [email or web address]
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
## 2023-09-05 NOTE — DISCHARGE SUMMARY
[facility] Medicine Services  DISCHARGE SUMMARY    Patient Name: Vidya Becerra  : 1980  MRN: [id number]    Discharge condition: Stable  Date of Admission: 9/3/2023  Discharge Diagnosis: Chest pain  Date of Discharge:  23    Primary Care Physician: Ana Rosa Doran APRN      Presenting Problem:   Mixed hyperlipidemia [E78.2]  Unstable angina [I20.0]  Chest pain [R07.9]  Essential hypertension [I10]  Leukocytosis, unspecified type [D72.829]  Chest pain, unspecified type [R07.9]    Active and Resolved Hospital Problems:  Active Hospital Problems    Diagnosis POA    **Chest pain [R07.9] Yes    GERD without esophagitis [K21.9] Yes    Leukocytosis [D72.829] Yes    Unstable angina [I20.0] Unknown    Mixed hyperlipidemia [E78.2] Yes    Chest pain, atypical [R07.89] Yes    Essential hypertension [I10] Yes    Class 1 obesity due to excess calories without serious comorbidity with body mass index (BMI) of 32.0 to 32.9 in adult [E66.09, Z68.32] Not Applicable    Tobacco dependence [F17.200] Yes      Resolved Hospital Problems   No resolved problems to display.         Hospital Course     Hospital Course:  Vidya Becerra is a 43 y.o. female who presented to the hospital with chest pain.  She was evaluated by the cardiologist and given her smoking history and hyperlipidemia and hypertension she was taken for left heart cath.  Nonobstructive disease was noted that required any kind of intervention hence the patient was cleared for discharge home by cardiology and sent home in stable condition with stable vitals once her symptoms resolved.  She was instructed on lifestyle modifications including smoking cessation and blood pressure control.        Reasons For Change In Medications and Indications for New Medications:      Day of Discharge     Vital Signs:  Temp:  [97.9 °F (36.6 °C)-98.6 °F (37 °C)] 97.9 °F (36.6 °C)  Heart Rate:  [61-75] 72  Resp:  [10-20] 10  BP: (103-144)/(62-85)  128/62  Flow (L/min):  [2] 2    Physical Exam:  Physical Exam  Vitals reviewed.   HENT:      Head: Normocephalic.      Nose: Nose normal.      Mouth/Throat:      Mouth: Mucous membranes are moist.   Cardiovascular:      Rate and Rhythm: Normal rate and regular rhythm.      Pulses: Normal pulses.      Heart sounds: Normal heart sounds.   Pulmonary:      Effort: Pulmonary effort is normal.      Breath sounds: Normal breath sounds.   Abdominal:      General: Abdomen is flat.      Palpations: Abdomen is soft.   Musculoskeletal:         General: Normal range of motion.      Cervical back: Normal range of motion.   Skin:     General: Skin is warm.   Neurological:      General: No focal deficit present.      Mental Status: She is alert and oriented to person, place, and time.   Psychiatric:         Mood and Affect: Mood normal.         Behavior: Behavior normal.          Pertinent  and/or Most Recent Results     LAB RESULTS:      Lab 09/05/23  0341 09/04/23 0047 08/31/23  1327   WBC 16.30* 20.30* 16.30*   HEMOGLOBIN 15.4 15.7 15.2   HEMATOCRIT 46.6 46.8* 44.1   PLATELETS 326 332 351   NEUTROS ABS 10.80* 14.80* 11.20*   LYMPHS ABS 4.00* 4.10* 3.80*   MONOS ABS 1.00* 1.20* 1.00*   EOS ABS 0.20 0.20 0.10   MCV 88.6 86.2 85.4         Lab 09/05/23  0341 09/04/23 0047 08/31/23  1327   SODIUM 139 139 139   POTASSIUM 3.7 3.6 3.4*   CHLORIDE 99 102 102   CO2 27.0 25.0 25.0   ANION GAP 13.0 12.0 12.0   BUN 5* 9 9   CREATININE 0.42* 0.70 0.64   EGFR 125.4 110.9 113.3   GLUCOSE 115* 127* 106*   CALCIUM 8.5* 9.9 9.6         Lab 09/04/23  0047 08/31/23  1327   TOTAL PROTEIN 6.9 6.8   ALBUMIN 4.1 4.2   GLOBULIN 2.8 2.6   ALT (SGPT) 28 27   AST (SGOT) 19 21   BILIRUBIN 0.3 0.3   ALK PHOS 116 108         Lab 09/04/23  0328 09/04/23 0047 08/31/23  1327   PROBNP  --  <36.0  --    HSTROP T <6 7 <6                 Brief Urine Lab Results  (Last result in the past 365 days)        Color   Clarity   Blood   Leuk Est   Nitrite   Protein    CREAT   Urine HCG        09/04/23 0326 Yellow   Clear   Negative   Negative   Negative   Negative                 Microbiology Results (last 10 days)       ** No results found for the last 240 hours. **            XR Knee 3 View Left    Result Date: 8/24/2023  Impression: Impression: 1.An acute osseous abnormality is not appreciated. Electronically Signed: Trenton Neal MD  8/24/2023 4:41 PM EDT  Workstation ID: [id number]    XR Chest 1 View    Result Date: 9/4/2023  Impression: Impression: No acute cardiopulmonary process. Electronically Signed: Sharda Plata MD  9/4/2023 1:08 AM EDT  Workstation ID: [id number]    XR Chest 1 View    Result Date: 8/31/2023  Impression: Impression: No acute cardiopulmonary finding. Electronically Signed: Tessa Longoria MD  8/31/2023 1:18 PM EDT  Workstation ID: [id number]             Labs Pending at Discharge:      Procedures Performed  Procedure(s):  Left Heart Cath         Consults:   Consults       Date and Time Order Name Status Description    9/4/2023  1:29 AM Cardiology (on-call MD unless specified) Completed     9/4/2023  1:29 AM Hospitalist (on-call MD unless specified)                Discharge Details        Discharge Medications        Changes to Medications        Instructions Start Date   lisinopril-hydrochlorothiazide 20-25 MG per tablet  Commonly known as: ONDINA HARRIS  What changed: Another medication with the same name was removed. Continue taking this medication, and follow the directions you see here.   1 tablet, Oral, Daily             Continue These Medications        Instructions Start Date   albuterol sulfate  (90 Base) MCG/ACT inhaler  Commonly known as: PROVENTIL HFA;VENTOLIN HFA;PROAIR HFA   Inhale 1-2 puffs As Needed. With bronchitis   use preop if needed bring with      amLODIPine 5 MG tablet  Commonly known as: NORVASC   5 mg, Oral, Daily      aspirin 81 MG EC tablet   81 mg, Oral, Daily      atorvastatin 40 MG tablet  Commonly known as: LIPITOR    TAKE 1 TABLET BY MOUTH EVERY DAY AT NIGHT      baclofen 10 MG tablet  Commonly known as: LIORESAL   10 mg, Oral, Nightly PRN      gabapentin 800 MG tablet  Commonly known as: NEURONTIN   800 mg, Oral, 3 Times Daily, Take preop      oxyCODONE-acetaminophen 7.5-325 MG per tablet  Commonly known as: PERCOCET   1 tablet, Oral, Every 8 Hours PRN      oxyCODONE-acetaminophen 7.5-325 MG per tablet  Commonly known as: PERCOCET   1 tablet, Oral, Every 8 Hours PRN   Start Date: October 4, 2023     oxyCODONE-acetaminophen 7.5-325 MG per tablet  Commonly known as: PERCOCET   1 tablet, Oral, Every 8 Hours PRN   Start Date: November 3, 2023     pantoprazole 40 MG EC tablet  Commonly known as: PROTONIX   40 mg, Oral, Nightly      promethazine-dextromethorphan 6.25-15 MG/5ML syrup  Commonly known as: PROMETHAZINE-DM   TAKE 5 ML BY MOUTH EVERY FOUR HOURS AS NEEDED FOR COUGH, DO NOT DRIVE WHILE ON MED DUE TO DROWSINESS             Stop These Medications      meloxicam 15 MG tablet  Commonly known as: MOBIC              Allergies   Allergen Reactions    Medrol [Methylprednisolone] Itching     Dosepak Oral steroids          Discharge Disposition:   Home or Self Care    Diet:  Hospital:  Diet Order   Procedures    Diet: Regular/House Diet; Texture: Regular Texture (IDDSI 7); Fluid Consistency: Thin (IDDSI 0)         Discharge Activity:         CODE STATUS:  Code Status and Medical Interventions:   Ordered at: 09/05/23 1322     Level Of Support Discussed With:    Patient     Code Status (Patient has no pulse and is not breathing):    CPR (Attempt to Resuscitate)     Medical Interventions (Patient has pulse or is breathing):    Full Support         Future Appointments   Date Time Provider Department Center   9/14/2023  9:30 AM JESSICA Malloy DPM [facility] [place]   9/14/2023 12:45 PM Leon Griffin DO [facility]   12/1/2023  1:00 PM Rakan Sanon MD [facility] PM [facility]   6/24/2024  2:00 PM Prachi López APRN [facility]  [facility]           Time spent on Discharge including face to face service:  45 minutes    This patient has been examined wearing appropriate Personal Protective Equipment and discussed with  patient and staff . 09/05/23      Signature: Rony Valerio MD

## 2023-09-05 NOTE — PLAN OF CARE
Problem: Adult Inpatient Plan of Care  Goal: Plan of Care Review  Outcome: Ongoing, Progressing  Goal: Patient-Specific Goal (Individualized)  Outcome: Ongoing, Progressing  Goal: Absence of Hospital-Acquired Illness or Injury  Outcome: Ongoing, Progressing  Intervention: Identify and Manage Fall Risk  Recent Flowsheet Documentation  Taken 9/5/2023 0440 by Kaur Mahan, RN  Safety Promotion/Fall Prevention:   clutter free environment maintained   room organization consistent   safety round/check completed  Taken 9/5/2023 0250 by Kaur Mahan RN  Safety Promotion/Fall Prevention:   clutter free environment maintained   safety round/check completed   room organization consistent  Taken 9/5/2023 0054 by Kaur Mahan RN  Safety Promotion/Fall Prevention:   clutter free environment maintained   safety round/check completed   room organization consistent  Taken 9/4/2023 2125 by Kaur Mahan RN  Safety Promotion/Fall Prevention:   clutter free environment maintained   room organization consistent   safety round/check completed  Intervention: Prevent Infection  Recent Flowsheet Documentation  Taken 9/5/2023 0440 by Kaur Mahan, RN  Infection Prevention:   environmental surveillance performed   single patient room provided   rest/sleep promoted  Taken 9/5/2023 0250 by Kaur Mahan RN  Infection Prevention:   environmental surveillance performed   single patient room provided   rest/sleep promoted  Taken 9/5/2023 0054 by Kaur Mahan, RN  Infection Prevention:   environmental surveillance performed   rest/sleep promoted   single patient room provided  Taken 9/4/2023 2125 by Kaur Mahan, RN  Infection Prevention:   environmental surveillance performed   single patient room provided   rest/sleep promoted  Goal: Optimal Comfort and Wellbeing  Outcome: Ongoing, Progressing  Intervention: Provide Person-Centered Care  Recent Flowsheet Documentation  Taken 9/4/2023 2125 by Kaur Mahan RN  Trust  Relationship/Rapport:   care explained   reassurance provided   thoughts/feelings acknowledged  Goal: Readiness for Transition of Care  Outcome: Ongoing, Progressing   Goal Outcome Evaluation:      Patient c/o pain in right foot, prn pain meds given. Heart cath planned for today. Vitals stable, no other complaints at this time.
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
## 2023-09-06 NOTE — CASE MANAGEMENT/SOCIAL WORK
Case Management Discharge Note      Final Note: Home           Transportation Services  Private: Car    Final Discharge Disposition Code: 01 - home or self-care

## 2023-09-13 DIAGNOSIS — M25.562 CHRONIC PAIN OF LEFT KNEE: Primary | ICD-10-CM

## 2023-09-13 DIAGNOSIS — G89.29 CHRONIC PAIN OF LEFT KNEE: Primary | ICD-10-CM

## 2023-09-13 DIAGNOSIS — M25.561 CHRONIC PAIN OF RIGHT KNEE: ICD-10-CM

## 2023-09-13 DIAGNOSIS — M25.361 KNEE INSTABILITY, RIGHT: ICD-10-CM

## 2023-09-13 DIAGNOSIS — M22.42 CHONDROMALACIA, PATELLA, LEFT: ICD-10-CM

## 2023-09-13 DIAGNOSIS — M17.11 ARTHRITIS OF RIGHT KNEE: ICD-10-CM

## 2023-09-13 DIAGNOSIS — G89.29 CHRONIC PAIN OF RIGHT KNEE: ICD-10-CM

## 2023-09-14 ENCOUNTER — OFFICE VISIT (OUTPATIENT)
Dept: PODIATRY | Facility: CLINIC | Age: 43
End: 2023-09-14
Payer: MEDICAID

## 2023-09-14 VITALS — RESPIRATION RATE: 20 BRPM | HEIGHT: 64 IN | BODY MASS INDEX: 36.7 KG/M2 | WEIGHT: 215 LBS

## 2023-09-14 DIAGNOSIS — S92.324K: ICD-10-CM

## 2023-09-14 DIAGNOSIS — M79.671 CHRONIC FOOT PAIN, RIGHT: Primary | ICD-10-CM

## 2023-09-14 DIAGNOSIS — G89.29 CHRONIC FOOT PAIN, RIGHT: Primary | ICD-10-CM

## 2023-09-14 DIAGNOSIS — M19.071 ARTHRITIS OF RIGHT FOOT: ICD-10-CM

## 2023-09-14 NOTE — PROGRESS NOTES
.09/14/2023  Foot and Ankle Surgery - Established Patient/Follow-up  Provider: Dr. Domenic Malloy, DARIA  Location: [facility]    Subjective:  Vidya Becerra is a 43 y.o. female.     Chief Complaint   Patient presents with    Right Foot - Follow-up, Pain     5/1/2023   Hardware removal, right foot  2.  Calcaneal bone marrow harvest, right foot  3.  Open midfoot reduction and internal fixation, right  4.  First tarsometatarsal joint arthrodesis, right  5.  Second tarsometatarsal joint arthrodesis, right  6.  Third tarsometatarsal joint arthrodesis, right      Follow-up     BOGDAN Doran aprn   April 2023 date unknown       HPI: The patient is a 43-year-old female who presents to the clinic for a follow-up involving her right foot.    The patient states she had surgery on 05/01/2023. She reports she has been to physical therapy twice weekly for at least 1.5 months. She notes she does not have the bulder feeling anymore when she ambulates; however, she still has some pain. She states her physical therapist was surprised how much movement she has in her ankle.    Allergies   Allergen Reactions    Medrol [Methylprednisolone] Itching     Dosepak Oral steroids        Current Outpatient Medications on File Prior to Visit   Medication Sig Dispense Refill    albuterol sulfate  (90 Base) MCG/ACT inhaler Inhale 1-2 puffs As Needed. With bronchitis   use preop if needed bring with      amLODIPine (NORVASC) 5 MG tablet Take 1 tablet by mouth Daily.      aspirin 81 MG EC tablet Take 1 tablet by mouth Daily. 30 tablet 0    atorvastatin (LIPITOR) 40 MG tablet TAKE 1 TABLET BY MOUTH EVERY DAY AT NIGHT 90 tablet 1    baclofen (LIORESAL) 10 MG tablet Take 1 tablet by mouth At Night As Needed.      gabapentin (NEURONTIN) 800 MG tablet Take 1 tablet by mouth 3 (Three) Times a Day. Take preop 90 tablet 2    lisinopril-hydrochlorothiazide (PRINZIDE,ZESTORETIC) 20-25 MG per tablet Take 1 tablet by mouth Daily.       "[START ON 11/3/2023] oxyCODONE-acetaminophen (PERCOCET) 7.5-325 MG per tablet Take 1 tablet by mouth Every 8 (Eight) Hours As Needed for Severe Pain. 90 tablet 0    [START ON 10/4/2023] oxyCODONE-acetaminophen (PERCOCET) 7.5-325 MG per tablet Take 1 tablet by mouth Every 8 (Eight) Hours As Needed for Severe Pain. 90 tablet 0    oxyCODONE-acetaminophen (PERCOCET) 7.5-325 MG per tablet Take 1 tablet by mouth Every 8 (Eight) Hours As Needed for Severe Pain. 90 tablet 0    pantoprazole (PROTONIX) 40 MG EC tablet Take 1 tablet by mouth Every Night.      promethazine-dextromethorphan (PROMETHAZINE-DM) 6.25-15 MG/5ML syrup TAKE 5 ML BY MOUTH EVERY FOUR HOURS AS NEEDED FOR COUGH, DO NOT DRIVE WHILE ON MED DUE TO DROWSINESS       No current facility-administered medications on file prior to visit.       Objective   Resp 20   Ht 162.6 cm (64\")   Wt 97.5 kg (215 lb)   BMI 36.90 kg/m²     Foot/Ankle Exam  GENERAL  Orientation:  AAOx3  Affect:  appropriate    VASCULAR     Right Foot Vascularity   Normal vascular exam    Dorsalis pedis:  2+  Posterior tibial:  2+  Skin temperature:  warm  Edema grading:  None  CFT:  < 3 seconds  Pedal hair growth:  Present  Varicosities:  none     Left Foot Vascularity   Normal vascular exam    Dorsalis pedis:  2+  Posterior tibial:  2+  Skin temperature:  warm  Edema grading:  None  CFT:  < 3 seconds  Pedal hair growth:  Present  Varicosities:  none     NEUROLOGIC     Right Foot Neurologic   Light touch sensation: normal  Hot/Cold sensation: normal  Achilles reflex:  2+     Left Foot Neurologic   Light touch sensation: normal  Hot/Cold sensation:  normal  Achilles reflex:  2+    MUSCULOSKELETAL     Right Foot Musculoskeletal   Arch:  Normal     Left Foot Musculoskeletal   Arch:  Normal    MUSCLE STRENGTH     Right Foot Muscle Strength   Normal strength    Foot dorsiflexion:  5  Foot plantar flexion:  5  Foot inversion:  5  Foot eversion:  5     Left Foot Muscle Strength   Normal strength  "   Foot dorsiflexion:  5  Foot plantar flexion:  5  Foot inversion:  5  Foot eversion:  5    DERMATOLOGIC      Right Foot Dermatologic   Skin  Right foot skin is intact.      Left Foot Dermatologic   Skin  Left foot skin is intact.     TESTS     Right Foot Tests   Anterior drawer: negative  Varus tilt: negative     Left Foot Tests   Anterior drawer: negative  Varus tilt: negative     Right foot additional comments: Moderate discomfort with palpation to the midfoot. Stable scar formations involving the dorsal aspect of the foot and lateral aspect of the rear foot. No open wounds, signs of inflammation or infection. Mild deformity with decreased medial arch. Limited range of motion to the midfoot. Knee brace in place to the right lower extremity.    04/11/2023  No progressive deformity or instability.    05/15/2023   Incision sites are dry and stable with intact sutures. No evidence of dehiscence or infection. Rectus foot alignment. Mild swelling to the midfoot as expected.    06/13/2023: Continued improvement with decreased edema and erythema. Incision sites are well healed at this time. No progressive deformity or instability.    07/17/2023  Continued improvement. Mild swelling involving the mid foot as expected. No pain with palpation. No progressive deformity or instability.    09/14/2023: Moderate swelling involving the midfoot but no significant pain with palpation. No progressive deformity or instability.    Assessment & Plan   Diagnoses and all orders for this visit:    1. Chronic foot pain, right (Primary)  -     Ambulatory Referral to Physical Therapy Evaluate and treat    2. Nondisplaced fracture of second metatarsal bone, right foot, subsequent encounter for fracture with nonunion  -     XR Foot 3+ View Right  -     Ambulatory Referral to Physical Therapy Evaluate and treat    3. Arthritis of right foot  -     Ambulatory Referral to Physical Therapy Evaluate and treat      The patient is a 43-year-old  female who presents to the office today for a follow-up involving her right foot. 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  I discussed the findings with the patient and recommended that we proceed with a bone stimulator.  Patient feels that she may have a bone stimulator at home.  I have asked that she find the device and contact our office.  If she does not have a proper bone stimulator, we will need to order and she will need to perform treatments daily.  I also feel that she should proceed with formal physical therapy to improve overall function.  I have asked that she continue wear the inserts and compression stockings on a daily basis.  She is to avoid uneven terrain and increased activity.  Patient is to return in 2 months for reevaluation and repeat imaging.  Greater than 20 minutes was spent before, during, and after evaluation for patient care.    Orders Placed This Encounter   Procedures    XR Foot 3+ View Right     Scheduling Instructions:      5/1/2023       Hardware removal, right foot      2.  Calcaneal bone marrow harvest, right foot      3.  Open midfoot reduction and internal fixation, right      4.  First tarsometatarsal joint arthrodesis, right      5.  Second tarsometatarsal joint arthrodesis, right      6.  Third tarsometatarsal joint arthrodesis, right     Order Specific Question:   Reason for Exam:     Answer:   nondisplaced fx 2nd metatarsal with nonunion  room 10  wb     Order Specific Question:   Patient Pregnant     Answer:   No     Order Specific Question:   Does this patient have a diabetic monitoring/medication delivering device on?     Answer:   No     Order Specific Question:   Release to patient     Answer:   Routine Release [[id number]]    Ambulatory Referral to Physical Therapy Evaluate and treat     Referral Priority:   Routine      Referral Type:   Physical Therapy     Referral Reason:   Specialty Services Required     Requested Specialty:   Physical Therapy     Number of Visits Requested:   1          Note is dictated utilizing voice recognition software. Unfortunately this leads to occasional typographical errors. I apologize in advance if the situation occurs. If questions occur please do not hesitate to call our office.    Transcribed from ambient dictation for JESSICA Malloy DPM by Glo Glover.  09/14/23   10:48 EDT    Patient or patient representative verbalized consent to the visit recording.  I have personally performed the services described in this document as transcribed by the above individual, and it is both accurate and complete.

## 2023-09-15 DIAGNOSIS — M25.561 CHRONIC PAIN OF RIGHT KNEE: ICD-10-CM

## 2023-09-15 DIAGNOSIS — G89.29 CHRONIC PAIN OF RIGHT KNEE: ICD-10-CM

## 2023-09-15 DIAGNOSIS — M25.562 PATELLOFEMORAL ARTHRALGIA OF LEFT KNEE: ICD-10-CM

## 2023-09-15 DIAGNOSIS — M17.11 ARTHRITIS OF RIGHT KNEE: Primary | ICD-10-CM

## 2023-09-26 ENCOUNTER — OFFICE VISIT (OUTPATIENT)
Dept: CARDIOLOGY | Facility: CLINIC | Age: 43
End: 2023-09-26
Payer: MEDICAID

## 2023-09-26 DIAGNOSIS — E78.2 MIXED HYPERLIPIDEMIA: Primary | ICD-10-CM

## 2023-09-26 DIAGNOSIS — E78.1 HYPERTRIGLYCERIDEMIA: ICD-10-CM

## 2023-09-26 DIAGNOSIS — E66.9 OBESITY, CLASS II, BMI 35-39.9: ICD-10-CM

## 2023-09-26 DIAGNOSIS — R07.9 CHEST PAIN NOT DUE TO ACUTE CORONARY SYNDROME: ICD-10-CM
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
## 2023-09-27 VITALS
HEIGHT: 64 IN | BODY MASS INDEX: 36.88 KG/M2 | HEART RATE: 78 BPM | DIASTOLIC BLOOD PRESSURE: 81 MMHG | OXYGEN SATURATION: 100 % | SYSTOLIC BLOOD PRESSURE: 114 MMHG | WEIGHT: 216 LBS

## 2023-09-29 ENCOUNTER — OFFICE VISIT (OUTPATIENT)
Dept: SPORTS MEDICINE | Facility: CLINIC | Age: 43
End: 2023-09-29
Payer: MEDICAID

## 2023-09-29 VITALS
BODY MASS INDEX: 36.88 KG/M2 | WEIGHT: 216 LBS | RESPIRATION RATE: 20 BRPM | HEIGHT: 64 IN | OXYGEN SATURATION: 98 % | HEART RATE: 80 BPM

## 2023-09-29 DIAGNOSIS — S83.521S RUPTURE OF POSTERIOR CRUCIATE LIGAMENT OF RIGHT KNEE, SEQUELA: ICD-10-CM

## 2023-09-29 DIAGNOSIS — G89.29 CHRONIC PAIN OF RIGHT KNEE: ICD-10-CM

## 2023-09-29 DIAGNOSIS — M25.561 CHRONIC PAIN OF RIGHT KNEE: ICD-10-CM

## 2023-09-29 DIAGNOSIS — M17.11 ARTHRITIS OF RIGHT KNEE: Primary | ICD-10-CM

## 2023-09-29 RX ORDER — LIDOCAINE HYDROCHLORIDE 10 MG/ML
2 INJECTION, SOLUTION EPIDURAL; INFILTRATION; INTRACAUDAL; PERINEURAL
Status: DISCONTINUED | OUTPATIENT
Start: 2023-09-29 | End: 2023-09-29 | Stop reason: HOSPADM

## 2023-09-29 RX ADMIN — LIDOCAINE HYDROCHLORIDE 2 ML: 10 INJECTION, SOLUTION EPIDURAL; INFILTRATION; INTRACAUDAL; PERINEURAL at 10:44
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
## 2023-10-05 ENCOUNTER — TREATMENT (OUTPATIENT)
Dept: PHYSICAL THERAPY | Facility: CLINIC | Age: 43
End: 2023-10-05
Payer: MEDICAID

## 2023-10-05 DIAGNOSIS — M25.561 ACUTE PAIN OF RIGHT KNEE: ICD-10-CM

## 2023-10-05 DIAGNOSIS — S92.324K: Primary | ICD-10-CM

## 2023-10-05 DIAGNOSIS — G90.521 COMPLEX REGIONAL PAIN SYNDROME TYPE 1 OF RIGHT LOWER EXTREMITY: ICD-10-CM

## 2023-10-05 DIAGNOSIS — M79.671 RIGHT FOOT PAIN: ICD-10-CM

## 2023-10-05 DIAGNOSIS — R26.9 GAIT DISTURBANCE: ICD-10-CM

## 2023-10-05 DIAGNOSIS — M19.071 ARTHRITIS OF RIGHT FOOT: ICD-10-CM

## 2023-10-05 DIAGNOSIS — S93.324S DISLOCATION OF TARSOMETATARSAL JOINT OF RIGHT FOOT, SEQUELA: ICD-10-CM

## 2023-10-05 DIAGNOSIS — Z98.1 HISTORY OF ANKLE FUSION: ICD-10-CM

## 2023-10-12 ENCOUNTER — TREATMENT (OUTPATIENT)
Dept: PHYSICAL THERAPY | Facility: CLINIC | Age: 43
End: 2023-10-12
Payer: MEDICAID

## 2023-10-12 DIAGNOSIS — M79.671 RIGHT FOOT PAIN: ICD-10-CM

## 2023-10-12 DIAGNOSIS — S92.324K: Primary | ICD-10-CM

## 2023-10-12 DIAGNOSIS — Z98.1 HISTORY OF ANKLE FUSION: ICD-10-CM

## 2023-10-12 DIAGNOSIS — R26.9 GAIT DISTURBANCE: ICD-10-CM

## 2023-10-13 LAB
QT INTERVAL: 438 MS
QTC INTERVAL: 463 MS

## 2023-11-14 ENCOUNTER — OFFICE VISIT (OUTPATIENT)
Dept: PODIATRY | Facility: CLINIC | Age: 43
End: 2023-11-14
Payer: MEDICAID

## 2023-11-14 VITALS — HEIGHT: 64 IN | RESPIRATION RATE: 20 BRPM | BODY MASS INDEX: 35.17 KG/M2 | WEIGHT: 206 LBS

## 2023-11-14 DIAGNOSIS — M79.671 RIGHT FOOT PAIN: Primary | ICD-10-CM

## 2023-11-14 DIAGNOSIS — G57.71 COMPLEX REGIONAL PAIN SYNDROME TYPE 2 OF RIGHT LOWER EXTREMITY: ICD-10-CM

## 2023-11-14 DIAGNOSIS — M76.71 PERONEAL TENDINITIS OF RIGHT LOWER EXTREMITY: ICD-10-CM

## 2023-11-14 DIAGNOSIS — S92.324K: ICD-10-CM

## 2023-11-14 NOTE — PROGRESS NOTES
11/14/2023  Foot and Ankle Surgery - Established Patient/Follow-up  Provider: Dr. Domenic Malloy DPM  Location: [facility]    Subjective:  Vidya Becerra is a 43 y.o. female.     Chief Complaint   Patient presents with    Right Foot - Follow-up       HPI: The patient is a 43-year-old female who returns for follow up regarding her right foot. She is accompanied by an adult male.    She is approximately 6 months status post right foot surgery. She reports significant improvement to her right foot. The patient states she has been using a bone stimulator; however, she has not used it for approximately 1 to 2 weeks due to moving. She notes she has been on her foot more than usual. She reports her foot did swell 3 times in size a couple of nights ago. The patient states she has been attending physical therapy; however, it feels very weak and painful. She notes she has been able to deal with the tingling; however, she has pain in her ankle. She reports she is unable to put her ankle down on her bed if she is laying on her side. The patient states her foot twitches off and she can not control it. She notes she walks without her brace at night. She reports her toenails are starting to curl and are very thick.    The patient states she is allergic to Medrol Dosepak. She notes she had a prednisone dose pack and it made her very sick. She reports she takes meloxicam.    Allergies   Allergen Reactions    Medrol [Methylprednisolone] Itching     Dosepak Oral steroids        Current Outpatient Medications on File Prior to Visit   Medication Sig Dispense Refill    albuterol sulfate  (90 Base) MCG/ACT inhaler Inhale 1-2 puffs As Needed. With bronchitis   use preop if needed bring with      amLODIPine (NORVASC) 5 MG tablet Take 1 tablet by mouth Daily.      aspirin 81 MG EC tablet Take 1 tablet by mouth Daily. 30 tablet 0    atorvastatin (LIPITOR) 40 MG tablet TAKE 1 TABLET BY MOUTH EVERY DAY AT NIGHT 90 tablet 1     "baclofen (LIORESAL) 10 MG tablet Take 1 tablet by mouth At Night As Needed.      gabapentin (NEURONTIN) 800 MG tablet Take 1 tablet by mouth 3 (Three) Times a Day. Take preop 90 tablet 2    lisinopril-hydrochlorothiazide (PRINZIDE,ZESTORETIC) 20-25 MG per tablet Take 1 tablet by mouth Daily.      oxyCODONE-acetaminophen (PERCOCET) 7.5-325 MG per tablet Take 1 tablet by mouth Every 8 (Eight) Hours As Needed for Severe Pain. 90 tablet 0    pantoprazole (PROTONIX) 40 MG EC tablet Take 1 tablet by mouth Every Night.      promethazine-dextromethorphan (PROMETHAZINE-DM) 6.25-15 MG/5ML syrup       oxyCODONE-acetaminophen (PERCOCET) 7.5-325 MG per tablet Take 1 tablet by mouth Every 8 (Eight) Hours As Needed for Severe Pain. 90 tablet 0    oxyCODONE-acetaminophen (PERCOCET) 7.5-325 MG per tablet Take 1 tablet by mouth Every 8 (Eight) Hours As Needed for Severe Pain. 90 tablet 0     No current facility-administered medications on file prior to visit.       Objective   Resp 20   Ht 162.6 cm (64\")   Wt 93.4 kg (206 lb)   BMI 35.36 kg/m²     Foot/Ankle Exam  GENERAL  Orientation:  AAOx3  Affect:  appropriate    VASCULAR     Right Foot Vascularity   Normal vascular exam    Dorsalis pedis:  2+  Posterior tibial:  2+  Skin temperature:  warm  Edema grading:  None  CFT:  < 3 seconds  Pedal hair growth:  Present  Varicosities:  none     Left Foot Vascularity   Normal vascular exam    Dorsalis pedis:  2+  Posterior tibial:  2+  Skin temperature:  warm  Edema grading:  None  CFT:  < 3 seconds  Pedal hair growth:  Present  Varicosities:  none     NEUROLOGIC     Right Foot Neurologic   Light touch sensation: normal  Hot/Cold sensation: normal  Achilles reflex:  2+     Left Foot Neurologic   Light touch sensation: normal  Hot/Cold sensation:  normal  Achilles reflex:  2+    MUSCULOSKELETAL     Right Foot Musculoskeletal   Arch:  Normal     Left Foot Musculoskeletal   Arch:  Normal    MUSCLE STRENGTH     Right Foot Muscle Strength "   Normal strength    Foot dorsiflexion:  5  Foot plantar flexion:  5  Foot inversion:  5  Foot eversion:  5     Left Foot Muscle Strength   Normal strength    Foot dorsiflexion:  5  Foot plantar flexion:  5  Foot inversion:  5  Foot eversion:  5    DERMATOLOGIC      Right Foot Dermatologic   Skin  Right foot skin is intact.      Left Foot Dermatologic   Skin  Left foot skin is intact.     TESTS     Right Foot Tests   Anterior drawer: negative  Varus tilt: negative     Left Foot Tests   Anterior drawer: negative  Varus tilt: negative     Right foot additional comments: Moderate discomfort with palpation to the midfoot. Stable scar formations involving the dorsal aspect of the foot and lateral aspect of the rear foot. No open wounds, signs of inflammation or infection. Mild deformity with decreased medial arch. Limited range of motion to the midfoot. Knee brace in place to the right lower extremity.    04/11/2023  No progressive deformity or instability.    05/15/2023   Incision sites are dry and stable with intact sutures. No evidence of dehiscence or infection. Rectus foot alignment. Mild swelling to the midfoot as expected.    06/13/2023: Continued improvement with decreased edema and erythema. Incision sites are well healed at this time. No progressive deformity or instability.    07/17/2023  Continued improvement. Mild swelling involving the mid foot as expected. No pain with palpation. No progressive deformity or instability.    09/14/2023: Moderate swelling involving the midfoot but no significant pain with palpation. No progressive deformity or instability.    11/14/2023: Continued discomfort involving the lateral aspect of the right foot. No significant pain or swelling involving the forefoot and midfoot region. No progressive deformity or instability.    Assessment & Plan   Diagnoses and all orders for this visit:    1. Right foot pain (Primary)  -     XR Foot 3+ View Right    2. Nondisplaced fracture of  second metatarsal bone, right foot, subsequent encounter for fracture with nonunion    3. Peroneal tendinitis of right lower extremity  -     Cancel: MRI Ankle Right Without Contrast; Future  -     MRI Ankle Right Without Contrast; Future    4. Complex regional pain syndrome type 2 of right lower extremity      Patient returns for follow-up involving her right foot.  She has noticed improvement since last exam to the midfoot but she continues to have issues involving the lateral aspect of the ankle.  She has substantial swelling and discomfort that prevents her from normal weightbearing activities and shoe gear use.  Her symptoms are at the level of the peroneal tendons where she has had previous surgery.  I have explained to her previously that there is concern for complex regional pain syndrome given that she does have prominent paresthesias and allodynia to the area as well.  Regardless, I do feel that proceeding with an MRI would be appropriate to evaluate the soft tissue structures at this level.  Imaging was reviewed today showing improvement noted to the first tarsometatarsal joint region but she continues to have nonunion to this area.  I have asked that she continue using the bone stimulator on a daily basis.  I would like her to remain in the appropriate shoes and inserts.  I would like to see her in 2 weeks for MRI review and further planning.  Greater than 20 minutes spent before, during, and after evaluation for patient care.      Orders Placed This Encounter   Procedures    XR Foot 3+ View Right     Scheduling Instructions:      R-14     Order Specific Question:   Reason for Exam:     Answer:   foot pain     Order Specific Question:   Patient Pregnant     Answer:   No     Order Specific Question:   Does this patient have a diabetic monitoring/medication delivering device on?     Answer:   No     Order Specific Question:   Release to patient     Answer:   Routine Release [[id number]]    MRI Ankle Right  Without Contrast     Standing Status:   Future     Standing Expiration Date:   11/14/2024     Order Specific Question:   Patient Pregnant     Answer:   No     Order Specific Question:   Release to patient     Answer:   Routine Release [[id number]]          Note is dictated utilizing voice recognition software. Unfortunately this leads to occasional typographical errors. I apologize in advance if the situation occurs. If questions occur please do not hesitate to call our office.    Transcribed from ambient dictation for JESSICA Malloy DPM by Glo Glover.  11/14/23   15:53 EST    Patient or patient representative verbalized consent to the visit recording.  I have personally performed the services described in this document as transcribed by the above individual, and it is both accurate and complete.

## 2023-11-30 ENCOUNTER — TELEPHONE (OUTPATIENT)
Dept: CARDIOLOGY | Facility: CLINIC | Age: 43
End: 2023-11-30
Payer: MEDICAID

## 2023-11-30 NOTE — TELEPHONE ENCOUNTER
Caller: Vidya Becerra    Relationship: Self    Best call back number: [phone]    What orders are you requesting (i.e. lab or imaging): LABS    In what timeframe would the patient need to come in: ASAP    Where will you receive your lab/imaging services: [facility]    Additional notes: PT IS GOING TOMORROW TO GET LABS DONE BEFORE 1PM TOMORROW

## 2023-12-01 ENCOUNTER — OFFICE VISIT (OUTPATIENT)
Dept: PAIN MEDICINE | Facility: CLINIC | Age: 43
End: 2023-12-01
Payer: MEDICAID

## 2023-12-01 VITALS
HEART RATE: 70 BPM | SYSTOLIC BLOOD PRESSURE: 114 MMHG | OXYGEN SATURATION: 97 % | DIASTOLIC BLOOD PRESSURE: 80 MMHG | RESPIRATION RATE: 16 BRPM

## 2023-12-01 DIAGNOSIS — G89.18 PAIN AT SURGICAL SITE: ICD-10-CM

## 2023-12-01 DIAGNOSIS — G90.521 COMPLEX REGIONAL PAIN SYNDROME TYPE 1 OF RIGHT LOWER EXTREMITY: ICD-10-CM

## 2023-12-01 DIAGNOSIS — Z79.899 HIGH RISK MEDICATION USE: Primary | ICD-10-CM

## 2023-12-01 PROCEDURE — G0463 HOSPITAL OUTPT CLINIC VISIT: HCPCS | Performed by: STUDENT IN AN ORGANIZED HEALTH CARE EDUCATION/TRAINING PROGRAM

## 2023-12-01 RX ORDER — OXYCODONE AND ACETAMINOPHEN 7.5; 325 MG/1; MG/1
1 TABLET ORAL EVERY 8 HOURS PRN
Qty: 90 TABLET | Refills: 0 | Status: SHIPPED | OUTPATIENT
Start: 2024-01-04

## 2023-12-01 RX ORDER — OXYCODONE AND ACETAMINOPHEN 7.5; 325 MG/1; MG/1
1 TABLET ORAL EVERY 8 HOURS PRN
Qty: 90 TABLET | Refills: 0 | Status: SHIPPED | OUTPATIENT
Start: 2024-02-03

## 2023-12-01 RX ORDER — GABAPENTIN 800 MG/1
800 TABLET ORAL 3 TIMES DAILY
Qty: 90 TABLET | Refills: 2 | Status: SHIPPED | OUTPATIENT
Start: 2023-12-01

## 2023-12-01 RX ORDER — OXYCODONE AND ACETAMINOPHEN 7.5; 325 MG/1; MG/1
1 TABLET ORAL EVERY 8 HOURS PRN
Qty: 90 TABLET | Refills: 0 | Status: SHIPPED | OUTPATIENT
Start: 2023-12-05

## 2023-12-01 RX ORDER — MELOXICAM 15 MG/1
1 TABLET ORAL DAILY
COMMUNITY
Start: 2023-11-12

## 2023-12-01 NOTE — PROGRESS NOTES
CHIEF COMPLAINT  Chief Complaint   Patient presents with    Knee Pain    Foot Pain    Back Pain     LD Oxycodone @ 1000 12/1/23       Primary Care  Ana Rosa Doran, GENEVA Rainey   Vidya Becerra is a 43 y.o. female  who presents for right foot and right ankle pain.  She states that she has had multiple surgeries on the right foot and right ankle and has developed what appears to be CRPS of the right lower extremity.  She describes pain especially below the knee into the right foot.  She also describes occasional swelling as well as decreased hair growth and components of allodynia in the right foot and right ankle.  She also has some occasional numbness and tingling as well as decreased strength and decreased capillary refill in the right lower extremity.  She has been tried on several medications in the past and has not gotten any significant benefit.  She is also been evaluated by podiatry without any surgical interventions at this time.    Back Pain  Associated symptoms include numbness and weakness.   Knee Pain   Associated symptoms include numbness.   Pain  Associated symptoms include arthralgias, myalgias, numbness and weakness. Pertinent negatives include no joint swelling.   Foot Pain  Associated symptoms include arthralgias, myalgias, numbness and weakness. Pertinent negatives include no joint swelling.        Location: Right ankle and right foot  Onset: Years ago  Duration: Slowly worsening  Timing: Constant throughout the day  Quality: Sharp, stabbing, burning  Severity: Today: 5       Last Week: 8       Worst: 10  Modifying Factors: The pain is worse with movement and physical activity walking and slightly improved with rest  Functional Deficit: The pain makes it difficult for her to work and perform her activities of daily living    Physical Therapy: yes    Interval Update 12/01/2023: Relatively unchanged.  Continues get benefit with her oxycodone.  She continues to work with podiatry.  Pending  MRI.      The following portions of the patient's history were reviewed and updated as appropriate: allergies, current medications, past family history, past medical history, past social history, past surgical history and problem list.    Procedures:  3/27/2023: Right-sided lumbar synthetic nerve block: 100% relief for 24 hours        Current Outpatient Medications:     albuterol sulfate  (90 Base) MCG/ACT inhaler, Inhale 1-2 puffs As Needed. With bronchitis   use preop if needed bring with, Disp: , Rfl:     amLODIPine (NORVASC) 5 MG tablet, Take 1 tablet by mouth Daily., Disp: , Rfl:     aspirin 81 MG EC tablet, Take 1 tablet by mouth Daily., Disp: 30 tablet, Rfl: 0    atorvastatin (LIPITOR) 40 MG tablet, TAKE 1 TABLET BY MOUTH EVERY DAY AT NIGHT, Disp: 90 tablet, Rfl: 1    baclofen (LIORESAL) 10 MG tablet, Take 1 tablet by mouth At Night As Needed., Disp: , Rfl:     gabapentin (NEURONTIN) 800 MG tablet, Take 1 tablet by mouth 3 (Three) Times a Day. Take preop, Disp: 90 tablet, Rfl: 2    lisinopril-hydrochlorothiazide (PRINZIDE,ZESTORETIC) 20-25 MG per tablet, Take 1 tablet by mouth Daily., Disp: , Rfl:     meloxicam (MOBIC) 15 MG tablet, Take 1 tablet by mouth Daily., Disp: , Rfl:     [START ON 2/3/2024] oxyCODONE-acetaminophen (PERCOCET) 7.5-325 MG per tablet, Take 1 tablet by mouth Every 8 (Eight) Hours As Needed for Severe Pain., Disp: 90 tablet, Rfl: 0    [START ON 1/4/2024] oxyCODONE-acetaminophen (PERCOCET) 7.5-325 MG per tablet, Take 1 tablet by mouth Every 8 (Eight) Hours As Needed for Severe Pain., Disp: 90 tablet, Rfl: 0    [START ON 12/5/2023] oxyCODONE-acetaminophen (PERCOCET) 7.5-325 MG per tablet, Take 1 tablet by mouth Every 8 (Eight) Hours As Needed for Severe Pain., Disp: 90 tablet, Rfl: 0    pantoprazole (PROTONIX) 40 MG EC tablet, Take 1 tablet by mouth Every Night., Disp: , Rfl:     promethazine-dextromethorphan (PROMETHAZINE-DM) 6.25-15 MG/5ML syrup, , Disp: , Rfl:     Review of Systems    Musculoskeletal:  Positive for arthralgias, back pain, gait problem and myalgias. Negative for joint swelling.   Neurological:  Positive for weakness and numbness.       Vitals:    12/01/23 1300   BP: 114/80   Pulse: 70   Resp: 16   SpO2: 97%   PainSc:   6       Urine Drug Screen: 4/27/2023  Appropriate: Yes    Objective   Physical Exam  Vitals and nursing note reviewed. Exam conducted with a chaperone present.   Constitutional:       General: She is not in acute distress.     Appearance: Normal appearance. She is normal weight.   Musculoskeletal:      Comments: Right foot is tender to palpation especially below the knee.  She also has decreased capillary refill in the right lower extremity compared to the left.  She shows decreased hair growth as well as some component of swelling compared to the left foot.  She is also losing some muscle mass and muscle strength in the right foot       Neurological:      Mental Status: She is alert.      Sensory: Sensory deficit present.      Motor: Weakness present.           Assessment & Plan   Problems Addressed this Visit    None  Visit Diagnoses       High risk medication use    -  Primary    Relevant Orders    Urine Drug Screen - Urine, Clean Catch    Pain at surgical site        Relevant Medications    oxyCODONE-acetaminophen (PERCOCET) 7.5-325 MG per tablet (Start on 2/3/2024)    oxyCODONE-acetaminophen (PERCOCET) 7.5-325 MG per tablet (Start on 1/4/2024)    oxyCODONE-acetaminophen (PERCOCET) 7.5-325 MG per tablet (Start on 12/5/2023)    Complex regional pain syndrome type 1 of right lower extremity        Relevant Medications    oxyCODONE-acetaminophen (PERCOCET) 7.5-325 MG per tablet (Start on 2/3/2024)    oxyCODONE-acetaminophen (PERCOCET) 7.5-325 MG per tablet (Start on 1/4/2024)    oxyCODONE-acetaminophen (PERCOCET) 7.5-325 MG per tablet (Start on 12/5/2023)          Diagnoses         Codes Comments    High risk medication use    -  Primary ICD-10-CM:  Z79.899  ICD-9-CM: V58.69     Pain at surgical site     ICD-10-CM: G89.18  ICD-9-CM: 338.18     Complex regional pain syndrome type 1 of right lower extremity     ICD-10-CM: G90.521  ICD-9-CM: 337.22             Plan:  Continue Percocet 7.5 mg 3 times daily  Refill gabapentin  UDS and inspect appropriate  --- Follow-up 3 months           INSPECT REPORT    As part of the patient's treatment plan, I may be prescribing controlled substances. The patient has been made aware of appropriate use of such medications, including potential risk of somnolence, limited ability to drive and/or work safely, and the potential for dependence or overdose. It has also bee made clear that these medications are for use by this patient only, without concomitant use of alcohol or other substances unless prescribed.     Patient has completed prescribing agreement detailing terms of continued prescribing of controlled substances, including monitoring CRISSY reports, urine drug screening, and pill counts if necessary. The patient is aware that inappropriate use will results in cessation of prescribing such medications.    INSPECT report has been reviewed and scanned into the patient's chart.    As the clinician, I personally reviewed the INSPECT from 11/29/2023.    History and physical exam exhibit continued safe and appropriate use of controlled substances.      EMR Dragon/Transcription disclaimer:   Much of this encounter note is an electronic transcription/translation of spoken language to printed text. The electronic translation of spoken language may permit erroneous, or at times, nonsensical words or phrases to be inadvertently transcribed; Although I have reviewed the note for such errors, some may still exist.

## 2023-12-15 ENCOUNTER — HOSPITAL ENCOUNTER (OUTPATIENT)
Dept: MRI IMAGING | Facility: HOSPITAL | Age: 43
Discharge: HOME | End: 2023-12-15
Admitting: PODIATRIST
Payer: MEDICAID

## 2023-12-15 DIAGNOSIS — M76.71 PERONEAL TENDINITIS OF RIGHT LOWER EXTREMITY: ICD-10-CM

## 2023-12-15 PROCEDURE — 73721 MRI JNT OF LWR EXTRE W/O DYE: CPT

## 2024-03-01 ENCOUNTER — OFFICE VISIT (OUTPATIENT)
Dept: PAIN MEDICINE | Facility: CLINIC | Age: 44
End: 2024-03-01
Payer: MEDICAID

## 2024-03-01 VITALS
OXYGEN SATURATION: 98 % | DIASTOLIC BLOOD PRESSURE: 86 MMHG | SYSTOLIC BLOOD PRESSURE: 125 MMHG | RESPIRATION RATE: 16 BRPM | HEART RATE: 86 BPM

## 2024-03-01 DIAGNOSIS — G89.18 PAIN AT SURGICAL SITE: ICD-10-CM

## 2024-03-01 DIAGNOSIS — G90.521 COMPLEX REGIONAL PAIN SYNDROME TYPE 1 OF RIGHT LOWER EXTREMITY: Primary | ICD-10-CM

## 2024-03-01 DIAGNOSIS — R52 PAIN: ICD-10-CM

## 2024-03-01 PROCEDURE — 3079F DIAST BP 80-89 MM HG: CPT | Performed by: STUDENT IN AN ORGANIZED HEALTH CARE EDUCATION/TRAINING PROGRAM

## 2024-03-01 PROCEDURE — 1160F RVW MEDS BY RX/DR IN RCRD: CPT | Performed by: STUDENT IN AN ORGANIZED HEALTH CARE EDUCATION/TRAINING PROGRAM

## 2024-03-01 PROCEDURE — 1159F MED LIST DOCD IN RCRD: CPT | Performed by: STUDENT IN AN ORGANIZED HEALTH CARE EDUCATION/TRAINING PROGRAM

## 2024-03-01 PROCEDURE — 1125F AMNT PAIN NOTED PAIN PRSNT: CPT | Performed by: STUDENT IN AN ORGANIZED HEALTH CARE EDUCATION/TRAINING PROGRAM

## 2024-03-01 PROCEDURE — 3074F SYST BP LT 130 MM HG: CPT | Performed by: STUDENT IN AN ORGANIZED HEALTH CARE EDUCATION/TRAINING PROGRAM

## 2024-03-01 PROCEDURE — 99214 OFFICE O/P EST MOD 30 MIN: CPT | Performed by: STUDENT IN AN ORGANIZED HEALTH CARE EDUCATION/TRAINING PROGRAM

## 2024-03-01 PROCEDURE — G0463 HOSPITAL OUTPT CLINIC VISIT: HCPCS | Performed by: STUDENT IN AN ORGANIZED HEALTH CARE EDUCATION/TRAINING PROGRAM

## 2024-03-01 RX ORDER — OXYCODONE AND ACETAMINOPHEN 7.5; 325 MG/1; MG/1
1 TABLET ORAL EVERY 8 HOURS PRN
Qty: 90 TABLET | Refills: 0 | Status: SHIPPED | OUTPATIENT
Start: 2024-05-03

## 2024-03-01 RX ORDER — OFLOXACIN 3 MG/ML
SOLUTION/ DROPS OPHTHALMIC
COMMUNITY
Start: 2023-12-29

## 2024-03-01 RX ORDER — OXYCODONE AND ACETAMINOPHEN 7.5; 325 MG/1; MG/1
1 TABLET ORAL EVERY 8 HOURS PRN
Qty: 90 TABLET | Refills: 0 | Status: SHIPPED | OUTPATIENT
Start: 2024-04-04

## 2024-03-01 RX ORDER — GABAPENTIN 800 MG/1
800 TABLET ORAL 3 TIMES DAILY
Qty: 90 TABLET | Refills: 2 | Status: SHIPPED | OUTPATIENT
Start: 2024-03-01

## 2024-03-01 RX ORDER — OXYCODONE AND ACETAMINOPHEN 7.5; 325 MG/1; MG/1
1 TABLET ORAL EVERY 8 HOURS PRN
Qty: 90 TABLET | Refills: 0 | Status: SHIPPED | OUTPATIENT
Start: 2024-03-05

## 2024-03-01 NOTE — PROGRESS NOTES
CHIEF COMPLAINT  Chief Complaint   Patient presents with    Pain     LD Oxycodone @ 1100 3/1/24       Primary Care  Ana Rosa Doran, GENEVA Rainey   Vidya Becerra is a 43 y.o. female  who presents for right foot and right ankle pain.  She states that she has had multiple surgeries on the right foot and right ankle and has developed what appears to be CRPS of the right lower extremity.  She describes pain especially below the knee into the right foot.  She also describes occasional swelling as well as decreased hair growth and components of allodynia in the right foot and right ankle.  She also has some occasional numbness and tingling as well as decreased strength and decreased capillary refill in the right lower extremity.  She has been tried on several medications in the past and has not gotten any significant benefit.  She is also been evaluated by podiatry without any surgical interventions at this time.    Back Pain  Associated symptoms include numbness and weakness.   Knee Pain   Associated symptoms include numbness.   Pain  Associated symptoms include arthralgias, myalgias, numbness and weakness. Pertinent negatives include no joint swelling.   Foot Pain  Associated symptoms include arthralgias, myalgias, numbness and weakness. Pertinent negatives include no joint swelling.        Location: Right ankle and right foot  Onset: Years ago  Duration: Slowly worsening  Timing: Constant throughout the day  Quality: Sharp, stabbing, burning  Severity: Today: 5       Last Week: 8       Worst: 10  Modifying Factors: The pain is worse with movement and physical activity walking and slightly improved with rest  Functional Deficit: The pain makes it difficult for her to work and perform her activities of daily living    Physical Therapy: yes    Interval Update 03/01/2024: She is pending surgery with podiatry.  Otherwise, continues with oxycodone 3 times daily      The following portions of the patient's history were  reviewed and updated as appropriate: allergies, current medications, past family history, past medical history, past social history, past surgical history and problem list.    Procedures:  3/27/2023: Right-sided lumbar synthetic nerve block: 100% relief for 24 hours        Current Outpatient Medications:     albuterol sulfate  (90 Base) MCG/ACT inhaler, Inhale 1-2 puffs As Needed. With bronchitis   use preop if needed bring with, Disp: , Rfl:     amLODIPine (NORVASC) 5 MG tablet, Take 1 tablet by mouth Daily., Disp: , Rfl:     aspirin 81 MG EC tablet, Take 1 tablet by mouth Daily., Disp: 30 tablet, Rfl: 0    atorvastatin (LIPITOR) 40 MG tablet, TAKE 1 TABLET BY MOUTH EVERY DAY AT NIGHT, Disp: 90 tablet, Rfl: 1    baclofen (LIORESAL) 10 MG tablet, Take 1 tablet by mouth At Night As Needed., Disp: , Rfl:     gabapentin (NEURONTIN) 800 MG tablet, Take 1 tablet by mouth 3 (Three) Times a Day. Take preop, Disp: 90 tablet, Rfl: 2    lisinopril-hydrochlorothiazide (PRINZIDE,ZESTORETIC) 20-25 MG per tablet, Take 1 tablet by mouth Daily., Disp: , Rfl:     meloxicam (MOBIC) 15 MG tablet, Take 1 tablet by mouth Daily., Disp: , Rfl:     ofloxacin (OCUFLOX) 0.3 % ophthalmic solution, INSTILL 1 DROP INTO AFFECTED EYE EVERY TWO HOURS WHILE AWAKE, Disp: , Rfl:     [START ON 5/3/2024] oxyCODONE-acetaminophen (PERCOCET) 7.5-325 MG per tablet, Take 1 tablet by mouth Every 8 (Eight) Hours As Needed for Severe Pain., Disp: 90 tablet, Rfl: 0    [START ON 4/4/2024] oxyCODONE-acetaminophen (PERCOCET) 7.5-325 MG per tablet, Take 1 tablet by mouth Every 8 (Eight) Hours As Needed for Severe Pain., Disp: 90 tablet, Rfl: 0    [START ON 3/5/2024] oxyCODONE-acetaminophen (PERCOCET) 7.5-325 MG per tablet, Take 1 tablet by mouth Every 8 (Eight) Hours As Needed for Severe Pain., Disp: 90 tablet, Rfl: 0    pantoprazole (PROTONIX) 40 MG EC tablet, Take 1 tablet by mouth Every Night., Disp: , Rfl:     promethazine-dextromethorphan  (PROMETHAZINE-DM) 6.25-15 MG/5ML syrup, , Disp: , Rfl:     Review of Systems   Musculoskeletal:  Positive for arthralgias, back pain, gait problem and myalgias. Negative for joint swelling.   Neurological:  Positive for weakness and numbness.       Vitals:    03/01/24 1323   BP: 125/86   Pulse: 86   Resp: 16   SpO2: 98%   PainSc:   6       Urine Drug Screen: 4/27/2023  Appropriate: Yes    Objective   Physical Exam  Vitals and nursing note reviewed. Exam conducted with a chaperone present.   Constitutional:       General: She is not in acute distress.     Appearance: Normal appearance. She is normal weight.   Musculoskeletal:      Comments: Right foot is tender to palpation especially below the knee.  She also has decreased capillary refill in the right lower extremity compared to the left.  She shows decreased hair growth as well as some component of swelling compared to the left foot.  She is also losing some muscle mass and muscle strength in the right foot       Neurological:      Mental Status: She is alert.      Sensory: Sensory deficit present.      Motor: Weakness present.           Assessment & Plan   Problems Addressed this Visit    None  Visit Diagnoses       Complex regional pain syndrome type 1 of right lower extremity    -  Primary    Relevant Medications    oxyCODONE-acetaminophen (PERCOCET) 7.5-325 MG per tablet (Start on 5/3/2024)    oxyCODONE-acetaminophen (PERCOCET) 7.5-325 MG per tablet (Start on 4/4/2024)    oxyCODONE-acetaminophen (PERCOCET) 7.5-325 MG per tablet (Start on 3/5/2024)    Pain at surgical site        Relevant Medications    oxyCODONE-acetaminophen (PERCOCET) 7.5-325 MG per tablet (Start on 5/3/2024)    oxyCODONE-acetaminophen (PERCOCET) 7.5-325 MG per tablet (Start on 4/4/2024)    oxyCODONE-acetaminophen (PERCOCET) 7.5-325 MG per tablet (Start on 3/5/2024)    Pain              Diagnoses         Codes Comments    Complex regional pain syndrome type 1 of right lower extremity    -   Primary ICD-10-CM: G90.521  ICD-9-CM: 337.22     Pain at surgical site     ICD-10-CM: G89.18  ICD-9-CM: 338.18     Pain     ICD-10-CM: R52  ICD-9-CM: 780.96             Plan:  Continue Percocet 7.5 mg 3 times daily  Refill gabapentin  UDS and inspect appropriate  --- Follow-up 3 months           INSPECT REPORT    As part of the patient's treatment plan, I may be prescribing controlled substances. The patient has been made aware of appropriate use of such medications, including potential risk of somnolence, limited ability to drive and/or work safely, and the potential for dependence or overdose. It has also bee made clear that these medications are for use by this patient only, without concomitant use of alcohol or other substances unless prescribed.     Patient has completed prescribing agreement detailing terms of continued prescribing of controlled substances, including monitoring CRISSY reports, urine drug screening, and pill counts if necessary. The patient is aware that inappropriate use will results in cessation of prescribing such medications.    INSPECT report has been reviewed and scanned into the patient's chart.    As the clinician, I personally reviewed the INSPECT from 2/28/2024.    History and physical exam exhibit continued safe and appropriate use of controlled substances.      EMR Dragon/Transcription disclaimer:   Much of this encounter note is an electronic transcription/translation of spoken language to printed text. The electronic translation of spoken language may permit erroneous, or at times, nonsensical words or phrases to be inadvertently transcribed; Although I have reviewed the note for such errors, some may still exist.

## 2024-04-18 ENCOUNTER — OFFICE VISIT (OUTPATIENT)
Dept: PODIATRY | Facility: CLINIC | Age: 44
End: 2024-04-18
Payer: MEDICAID

## 2024-04-18 VITALS
HEIGHT: 64 IN | BODY MASS INDEX: 32.78 KG/M2 | WEIGHT: 192 LBS | HEART RATE: 86 BPM | RESPIRATION RATE: 20 BRPM | OXYGEN SATURATION: 97 %

## 2024-04-18 DIAGNOSIS — M76.71 PERONEAL TENDINITIS OF RIGHT LOWER EXTREMITY: ICD-10-CM

## 2024-04-18 DIAGNOSIS — G57.71 COMPLEX REGIONAL PAIN SYNDROME TYPE 2 OF RIGHT LOWER EXTREMITY: ICD-10-CM

## 2024-04-18 DIAGNOSIS — G57.81 SURAL NEUROPATHY, RIGHT: ICD-10-CM

## 2024-04-18 DIAGNOSIS — M79.671 RIGHT FOOT PAIN: Primary | ICD-10-CM

## 2024-04-18 RX ORDER — TRIAMCINOLONE ACETONIDE 40 MG/ML
20 INJECTION, SUSPENSION INTRA-ARTICULAR; INTRAMUSCULAR ONCE
Status: COMPLETED | OUTPATIENT
Start: 2024-04-18 | End: 2024-04-18

## 2024-04-18 RX ADMIN — TRIAMCINOLONE ACETONIDE 20 MG: 40 INJECTION, SUSPENSION INTRA-ARTICULAR; INTRAMUSCULAR at 11:20

## 2024-04-18 NOTE — PROGRESS NOTES
"04/18/2024  Peroneal tendon/sural nerve Steroid Injection: Right    Consent and time out was performed before proceeding with the procedure. The skin around the lateral malleoli region at the level of the peroneal tendons was cleansed with alcohol. A solution totaling 1.5mL was injected into the subcutaneous tissues and peroneal tendon sheath utilizing a 25g 1.5\" hypodermic needle. The solution contained 0.5cc of 0.5% Marcaine plain, 0.5cc of 1% lidocaine plain, and 0.5cc of Kenalog.  Mild compression was then applied to the injection site and bandage applied. The patient tolerated the injection well without complication.   "

## 2024-04-18 NOTE — PROGRESS NOTES
04/18/2024  Foot and Ankle Surgery - Established Patient/Follow-up  Provider: Dr. Domenic Malloy DPM  Location: [facility]    Subjective:  Vidya Becerra is a 44 y.o. female.     Chief Complaint   Patient presents with    Right Ankle - Follow-up, Pain     Mri results today    Right Foot - Follow-up, Fracture     Nondisplaced fx 2nd metatarsal     Follow-up     BOGDAN mchugh       HPI:   The patient is a 44-year-old female who continues to have pain in the ankle.    The patient underwent an MRI some time ago. She continues to experience pain on the lateral aspect of her foot and ankle, particularly when descending stairs. A total knee replacement is necessary, however, the decision to postpone the procedure until she reaches the age of 51-years-old, necessitating the use of a brace. A dime-sized bone was removed from her heel bone, resulting in a scar. She no longer experiences the pain on the dorsal aspect of her foot. A gel injection she received in 09/2023 did not provide significant relief; she will contact Dr. Griffin regarding this.     Allergies   Allergen Reactions    Medrol [Methylprednisolone] Itching     Dosepak Oral steroids        Current Outpatient Medications on File Prior to Visit   Medication Sig Dispense Refill    albuterol sulfate  (90 Base) MCG/ACT inhaler Inhale 1-2 puffs As Needed. With bronchitis   use preop if needed bring with      amLODIPine (NORVASC) 5 MG tablet Take 1 tablet by mouth Daily.      aspirin 81 MG EC tablet Take 1 tablet by mouth Daily. 30 tablet 0    atorvastatin (LIPITOR) 40 MG tablet TAKE 1 TABLET BY MOUTH EVERY DAY AT NIGHT 90 tablet 1    baclofen (LIORESAL) 10 MG tablet Take 1 tablet by mouth At Night As Needed.      gabapentin (NEURONTIN) 800 MG tablet Take 1 tablet by mouth 3 (Three) Times a Day. Take preop 90 tablet 2    lisinopril-hydrochlorothiazide (PRINZIDE,ZESTORETIC) 20-25 MG per tablet Take 1 tablet by mouth Daily.      meloxicam (MOBIC) 15 MG  "tablet Take 1 tablet by mouth Daily.      ofloxacin (OCUFLOX) 0.3 % ophthalmic solution INSTILL 1 DROP INTO AFFECTED EYE EVERY TWO HOURS WHILE AWAKE      [START ON 5/3/2024] oxyCODONE-acetaminophen (PERCOCET) 7.5-325 MG per tablet Take 1 tablet by mouth Every 8 (Eight) Hours As Needed for Severe Pain. 90 tablet 0    oxyCODONE-acetaminophen (PERCOCET) 7.5-325 MG per tablet Take 1 tablet by mouth Every 8 (Eight) Hours As Needed for Severe Pain. 90 tablet 0    oxyCODONE-acetaminophen (PERCOCET) 7.5-325 MG per tablet Take 1 tablet by mouth Every 8 (Eight) Hours As Needed for Severe Pain. 90 tablet 0    pantoprazole (PROTONIX) 40 MG EC tablet Take 1 tablet by mouth Every Night.      promethazine-dextromethorphan (PROMETHAZINE-DM) 6.25-15 MG/5ML syrup        No current facility-administered medications on file prior to visit.       Objective   Pulse 86   Resp 20   Ht 162.6 cm (64\")   Wt 87.1 kg (192 lb)   SpO2 97%   BMI 32.96 kg/m²     Foot/Ankle Exam    GENERAL  Orientation:  AAOx3  Affect:  appropriate    VASCULAR     Right Foot Vascularity   Normal vascular exam    Dorsalis pedis:  2+  Posterior tibial:  2+  Skin temperature:  warm  Edema grading:  None  CFT:  < 3 seconds  Pedal hair growth:  Present  Varicosities:  none     Left Foot Vascularity   Normal vascular exam    Dorsalis pedis:  2+  Posterior tibial:  2+  Skin temperature:  warm  Edema grading:  None  CFT:  < 3 seconds  Pedal hair growth:  Present  Varicosities:  none     NEUROLOGIC     Right Foot Neurologic   Light touch sensation: normal  Hot/Cold sensation: normal  Achilles reflex:  2+     Left Foot Neurologic   Light touch sensation: normal  Hot/Cold sensation:  normal  Achilles reflex:  2+    MUSCULOSKELETAL     Right Foot Musculoskeletal   Arch:  Normal     Left Foot Musculoskeletal   Arch:  Normal    MUSCLE STRENGTH     Right Foot Muscle Strength   Normal strength    Foot dorsiflexion:  5  Foot plantar flexion:  5  Foot inversion:  5  Foot " eversion:  5     Left Foot Muscle Strength   Normal strength    Foot dorsiflexion:  5  Foot plantar flexion:  5  Foot inversion:  5  Foot eversion:  5    DERMATOLOGIC      Right Foot Dermatologic   Skin  Right foot skin is intact.      Left Foot Dermatologic   Skin  Left foot skin is intact.     TESTS     Right Foot Tests   Anterior drawer: negative  Varus tilt: negative     Left Foot Tests   Anterior drawer: negative  Varus tilt: negative     Right foot additional comments: Moderate discomfort with palpation to the midfoot. Stable scar formations involving the dorsal aspect of the foot and lateral aspect of the rear foot. No open wounds, signs of inflammation or infection. Mild deformity with decreased medial arch. Limited range of motion to the midfoot. Knee brace in place to the right lower extremity.    04/11/2023  No progressive deformity or instability.    05/15/2023   Incision sites are dry and stable with intact sutures. No evidence of dehiscence or infection. Rectus foot alignment. Mild swelling to the midfoot as expected.    06/13/2023: Continued improvement with decreased edema and erythema. Incision sites are well healed at this time. No progressive deformity or instability.    07/17/2023  Continued improvement. Mild swelling involving the mid foot as expected. No pain with palpation. No progressive deformity or instability.    09/14/2023: Moderate swelling involving the midfoot but no significant pain with palpation. No progressive deformity or instability.    11/14/2023: Continued discomfort involving the lateral aspect of the right foot. No significant pain or swelling involving the forefoot and midfoot region. No progressive deformity or instability.    04/18/2024: Discomfort is felt upon palpation on the lateral aspect of the foot and ankle. No progressive deformity or instability. No swelling or limitation.      Assessment & Plan   Diagnoses and all orders for this visit:    1. Right foot pain  (Primary)  -     triamcinolone acetonide (KENALOG-40) injection 20 mg    2. Peroneal tendinitis of right lower extremity    3. Sural neuropathy, right    4. Complex regional pain syndrome type 2 of right lower extremity        The patient's foot and ankle function is compromised due to her knee condition. Given the absence of structural or tendon issues, surgical intervention is not feasible. However, the primary issue is the functionality of her ankle and foot due to the limitation of her knee condition. It is plausible that she is experiencing nerve-related issues and potential issues with the joint. A comprehensive discussion was held with the patient regarding potential treatment strategies, including conservative management, injection therapy, and surgical intervention. The patient expressed a preference for a steroid injection, which will be administered today.    Follow-up  The patient is scheduled for a follow-up visit in 6 weeks.    Reviewed proper basic stretching and manual therapy exercises along with appropriate shoes and activity.  Discussed proper use and/or avoidance of OTC anti-inflammatories.  Patient is to call with any additional issues or concerns.  Greater than 20 minutes was spent before, during, and after evaluation for patient care.    No orders of the defined types were placed in this encounter.         Note is dictated utilizing voice recognition software. Unfortunately this leads to occasional typographical errors. I apologize in advance if the situation occurs. If questions occur please do not hesitate to call our office.    Transcribed from ambient dictation for JESSICA Malloy DPM by Dorothy Ardon.  04/18/24   11:39 EDT    Patient or patient representative verbalized consent to the visit recording.  I have personally performed the services described in this document as transcribed by the above individual, and it is both accurate and complete.

## 2024-05-20 ENCOUNTER — TELEPHONE (OUTPATIENT)
Dept: SPORTS MEDICINE | Facility: CLINIC | Age: 44
End: 2024-05-20
Payer: MEDICAID

## 2024-05-20 NOTE — TELEPHONE ENCOUNTER
Caller: Vidya Becerra    Relationship to patient: Self    Best call back number: [phone]    Chief complaint: RIGHT KNEE PAIN     Type of visit: LIDOCAINE INJECTION     Requested date: ANYTIME      If rescheduling, when is the original appointment: NA      Additional notes: NA

## 2024-05-22 ENCOUNTER — OFFICE VISIT (OUTPATIENT)
Dept: ORTHOPEDIC SURGERY | Facility: CLINIC | Age: 44
End: 2024-05-22
Payer: MEDICAID

## 2024-05-22 VITALS — BODY MASS INDEX: 32.78 KG/M2 | HEIGHT: 64 IN | OXYGEN SATURATION: 99 % | WEIGHT: 192 LBS | HEART RATE: 64 BPM

## 2024-05-22 DIAGNOSIS — M25.561 CHRONIC PAIN OF RIGHT KNEE: ICD-10-CM

## 2024-05-22 DIAGNOSIS — G89.29 CHRONIC PAIN OF RIGHT KNEE: ICD-10-CM

## 2024-05-22 DIAGNOSIS — M25.561 PATELLOFEMORAL ARTHRALGIA OF RIGHT KNEE: ICD-10-CM

## 2024-05-22 DIAGNOSIS — M17.31 POST-TRAUMATIC OSTEOARTHRITIS OF RIGHT KNEE: Primary | ICD-10-CM

## 2024-05-22 DIAGNOSIS — S83.521S RUPTURE OF POSTERIOR CRUCIATE LIGAMENT OF RIGHT KNEE, SEQUELA: ICD-10-CM
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
## 2024-05-30 ENCOUNTER — OFFICE VISIT (OUTPATIENT)
Dept: PODIATRY | Facility: CLINIC | Age: 44
End: 2024-05-30
Payer: MEDICAID

## 2024-05-30 VITALS
HEIGHT: 64 IN | RESPIRATION RATE: 20 BRPM | OXYGEN SATURATION: 100 % | HEART RATE: 69 BPM | BODY MASS INDEX: 32.78 KG/M2 | WEIGHT: 192 LBS

## 2024-05-30 DIAGNOSIS — M76.71 PERONEAL TENDINITIS OF RIGHT LOWER EXTREMITY: ICD-10-CM

## 2024-05-30 DIAGNOSIS — M79.671 RIGHT FOOT PAIN: Primary | ICD-10-CM

## 2024-05-30 DIAGNOSIS — G57.81 SURAL NEUROPATHY, RIGHT: ICD-10-CM

## 2024-05-30 DIAGNOSIS — G57.71 COMPLEX REGIONAL PAIN SYNDROME TYPE 2 OF RIGHT LOWER EXTREMITY: ICD-10-CM

## 2024-05-31 ENCOUNTER — OFFICE VISIT (OUTPATIENT)
Dept: ORTHOPEDIC SURGERY | Facility: CLINIC | Age: 44
End: 2024-05-31
Payer: MEDICAID

## 2024-05-31 ENCOUNTER — OFFICE VISIT (OUTPATIENT)
Dept: PAIN MEDICINE | Facility: CLINIC | Age: 44
End: 2024-05-31
Payer: MEDICAID

## 2024-05-31 VITALS — HEIGHT: 64 IN | OXYGEN SATURATION: 97 % | HEART RATE: 87 BPM | WEIGHT: 192 LBS | BODY MASS INDEX: 32.78 KG/M2

## 2024-05-31 VITALS
SYSTOLIC BLOOD PRESSURE: 109 MMHG | RESPIRATION RATE: 16 BRPM | DIASTOLIC BLOOD PRESSURE: 72 MMHG | HEART RATE: 78 BPM | OXYGEN SATURATION: 98 %

## 2024-05-31 DIAGNOSIS — M17.31 POST-TRAUMATIC OSTEOARTHRITIS OF RIGHT KNEE: Primary | ICD-10-CM

## 2024-05-31 DIAGNOSIS — G90.521 COMPLEX REGIONAL PAIN SYNDROME TYPE 1 OF RIGHT LOWER EXTREMITY: ICD-10-CM

## 2024-05-31 DIAGNOSIS — G89.18 PAIN AT SURGICAL SITE: ICD-10-CM

## 2024-05-31 DIAGNOSIS — M25.361 KNEE INSTABILITY, RIGHT: ICD-10-CM

## 2024-05-31 PROCEDURE — G0463 HOSPITAL OUTPT CLINIC VISIT: HCPCS | Performed by: STUDENT IN AN ORGANIZED HEALTH CARE EDUCATION/TRAINING PROGRAM

## 2024-05-31 PROCEDURE — 3074F SYST BP LT 130 MM HG: CPT | Performed by: STUDENT IN AN ORGANIZED HEALTH CARE EDUCATION/TRAINING PROGRAM

## 2024-05-31 PROCEDURE — 1159F MED LIST DOCD IN RCRD: CPT | Performed by: STUDENT IN AN ORGANIZED HEALTH CARE EDUCATION/TRAINING PROGRAM

## 2024-05-31 PROCEDURE — 1125F AMNT PAIN NOTED PAIN PRSNT: CPT | Performed by: STUDENT IN AN ORGANIZED HEALTH CARE EDUCATION/TRAINING PROGRAM

## 2024-05-31 PROCEDURE — 1160F RVW MEDS BY RX/DR IN RCRD: CPT | Performed by: STUDENT IN AN ORGANIZED HEALTH CARE EDUCATION/TRAINING PROGRAM

## 2024-05-31 PROCEDURE — 3078F DIAST BP <80 MM HG: CPT | Performed by: STUDENT IN AN ORGANIZED HEALTH CARE EDUCATION/TRAINING PROGRAM

## 2024-05-31 PROCEDURE — 99214 OFFICE O/P EST MOD 30 MIN: CPT | Performed by: STUDENT IN AN ORGANIZED HEALTH CARE EDUCATION/TRAINING PROGRAM

## 2024-05-31 RX ORDER — OXYCODONE AND ACETAMINOPHEN 7.5; 325 MG/1; MG/1
1 TABLET ORAL EVERY 8 HOURS PRN
Qty: 90 TABLET | Refills: 0 | Status: SHIPPED | OUTPATIENT
Start: 2024-08-03

## 2024-05-31 RX ORDER — OXYCODONE AND ACETAMINOPHEN 7.5; 325 MG/1; MG/1
1 TABLET ORAL EVERY 8 HOURS PRN
Qty: 90 TABLET | Refills: 0 | Status: SHIPPED | OUTPATIENT
Start: 2024-06-05

## 2024-05-31 RX ORDER — OXYCODONE AND ACETAMINOPHEN 7.5; 325 MG/1; MG/1
1 TABLET ORAL EVERY 8 HOURS PRN
Qty: 90 TABLET | Refills: 0 | Status: SHIPPED | OUTPATIENT
Start: 2024-07-04

## 2024-05-31 RX ORDER — MELOXICAM 7.5 MG/1
15 TABLET ORAL ONCE
OUTPATIENT
Start: 2024-05-31 | End: 2024-05-31

## 2024-05-31 RX ORDER — PREGABALIN 150 MG/1
150 CAPSULE ORAL ONCE
OUTPATIENT
Start: 2024-05-31 | End: 2024-05-31

## 2024-05-31 RX ORDER — CHLORHEXIDINE GLUCONATE 500 MG/1
CLOTH TOPICAL ONCE
OUTPATIENT
Start: 2024-05-31
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
## 2024-05-31 NOTE — PROGRESS NOTES
CHIEF COMPLAINT  Chief Complaint   Patient presents with    Pain     LD Oxycodone @ 0930 5/31/24       Primary Care  Melo Jhonathan A, GENEVA Rainey   Vidya Becerra is a 44 y.o. female  who presents for right foot and right ankle pain.  She states that she has had multiple surgeries on the right foot and right ankle and has developed what appears to be CRPS of the right lower extremity.  She describes pain especially below the knee into the right foot.  She also describes occasional swelling as well as decreased hair growth and components of allodynia in the right foot and right ankle.  She also has some occasional numbness and tingling as well as decreased strength and decreased capillary refill in the right lower extremity.  She has been tried on several medications in the past and has not gotten any significant benefit.  She is also been evaluated by podiatry without any surgical interventions at this time.    Back Pain  Associated symptoms include numbness and weakness.   Knee Pain   Associated symptoms include numbness.   Pain  Associated symptoms include arthralgias, myalgias, numbness and weakness. Pertinent negatives include no joint swelling.   Foot Pain  Associated symptoms include arthralgias, myalgias, numbness and weakness. Pertinent negatives include no joint swelling.        Location: Right ankle and right foot  Onset: Years ago  Duration: Slowly worsening  Timing: Constant throughout the day  Quality: Sharp, stabbing, burning  Severity: Today: 5       Last Week: 8       Worst: 10  Modifying Factors: The pain is worse with movement and physical activity walking and slightly improved with rest  Functional Deficit: The pain makes it difficult for her to work and perform her activities of daily living    Physical Therapy: yes    Interval Update 05/31/2024: She is pending knee surgery in the next 1 to 2 months.  Otherwise, relatively unchanged      The following portions of the patient's history were  reviewed and updated as appropriate: allergies, current medications, past family history, past medical history, past social history, past surgical history and problem list.    Procedures:  3/27/2023: Right-sided lumbar synthetic nerve block: 100% relief for 24 hours        Current Outpatient Medications:     albuterol sulfate  (90 Base) MCG/ACT inhaler, Inhale 1-2 puffs As Needed. With bronchitis   use preop if needed bring with, Disp: , Rfl:     amLODIPine (NORVASC) 5 MG tablet, Take 1 tablet by mouth Daily., Disp: , Rfl:     aspirin 81 MG EC tablet, Take 1 tablet by mouth Daily., Disp: 30 tablet, Rfl: 0    atorvastatin (LIPITOR) 40 MG tablet, TAKE 1 TABLET BY MOUTH EVERY DAY AT NIGHT, Disp: 90 tablet, Rfl: 1    baclofen (LIORESAL) 10 MG tablet, Take 1 tablet by mouth At Night As Needed., Disp: , Rfl:     gabapentin (NEURONTIN) 800 MG tablet, Take 1 tablet by mouth 3 (Three) Times a Day. Take preop, Disp: 90 tablet, Rfl: 2    lisinopril-hydrochlorothiazide (PRINZIDE,ZESTORETIC) 20-25 MG per tablet, Take 1 tablet by mouth Daily., Disp: , Rfl:     meloxicam (MOBIC) 15 MG tablet, Take 1 tablet by mouth Daily., Disp: , Rfl:     ofloxacin (OCUFLOX) 0.3 % ophthalmic solution, INSTILL 1 DROP INTO AFFECTED EYE EVERY TWO HOURS WHILE AWAKE, Disp: , Rfl:     [START ON 8/3/2024] oxyCODONE-acetaminophen (PERCOCET) 7.5-325 MG per tablet, Take 1 tablet by mouth Every 8 (Eight) Hours As Needed for Severe Pain., Disp: 90 tablet, Rfl: 0    [START ON 7/4/2024] oxyCODONE-acetaminophen (PERCOCET) 7.5-325 MG per tablet, Take 1 tablet by mouth Every 8 (Eight) Hours As Needed for Severe Pain., Disp: 90 tablet, Rfl: 0    [START ON 6/5/2024] oxyCODONE-acetaminophen (PERCOCET) 7.5-325 MG per tablet, Take 1 tablet by mouth Every 8 (Eight) Hours As Needed for Severe Pain., Disp: 90 tablet, Rfl: 0    pantoprazole (PROTONIX) 40 MG EC tablet, Take 1 tablet by mouth Every Night., Disp: , Rfl:     Review of Systems   Musculoskeletal:   Positive for arthralgias, back pain, gait problem and myalgias. Negative for joint swelling.   Neurological:  Positive for weakness and numbness.       Vitals:    05/31/24 1306   BP: 109/72   Pulse: 78   Resp: 16   SpO2: 98%   PainSc:   7       Urine Drug Screen: 4/27/2023  Appropriate: Yes    Objective   Physical Exam  Vitals and nursing note reviewed. Exam conducted with a chaperone present.   Constitutional:       General: She is not in acute distress.     Appearance: Normal appearance. She is normal weight.   Musculoskeletal:      Comments: Right foot is tender to palpation especially below the knee.  She also has decreased capillary refill in the right lower extremity compared to the left.  She shows decreased hair growth as well as some component of swelling compared to the left foot.  She is also losing some muscle mass and muscle strength in the right foot       Neurological:      Mental Status: She is alert.      Sensory: Sensory deficit present.      Motor: Weakness present.           Assessment & Plan   Problems Addressed this Visit    None  Visit Diagnoses       Complex regional pain syndrome type 1 of right lower extremity        Relevant Medications    oxyCODONE-acetaminophen (PERCOCET) 7.5-325 MG per tablet (Start on 8/3/2024)    oxyCODONE-acetaminophen (PERCOCET) 7.5-325 MG per tablet (Start on 7/4/2024)    oxyCODONE-acetaminophen (PERCOCET) 7.5-325 MG per tablet (Start on 6/5/2024)    Pain at surgical site        Relevant Medications    oxyCODONE-acetaminophen (PERCOCET) 7.5-325 MG per tablet (Start on 8/3/2024)    oxyCODONE-acetaminophen (PERCOCET) 7.5-325 MG per tablet (Start on 7/4/2024)    oxyCODONE-acetaminophen (PERCOCET) 7.5-325 MG per tablet (Start on 6/5/2024)          Diagnoses         Codes Comments    Complex regional pain syndrome type 1 of right lower extremity     ICD-10-CM: G90.521  ICD-9-CM: 337.22     Pain at surgical site     ICD-10-CM: G89.18  ICD-9-CM: 338.18              Plan:  Continue Percocet 7.5 mg 3 times daily  Refill gabapentin  UDS and inspect appropriate  --- Follow-up 3 months           INSPECT REPORT    As part of the patient's treatment plan, I may be prescribing controlled substances. The patient has been made aware of appropriate use of such medications, including potential risk of somnolence, limited ability to drive and/or work safely, and the potential for dependence or overdose. It has also bee made clear that these medications are for use by this patient only, without concomitant use of alcohol or other substances unless prescribed.     Patient has completed prescribing agreement detailing terms of continued prescribing of controlled substances, including monitoring CRISSY reports, urine drug screening, and pill counts if necessary. The patient is aware that inappropriate use will results in cessation of prescribing such medications.    INSPECT report has been reviewed and scanned into the patient's chart.    As the clinician, I personally reviewed the INSPECT from 5/29/2024.    History and physical exam exhibit continued safe and appropriate use of controlled substances.      EMR Dragon/Transcription disclaimer:   Much of this encounter note is an electronic transcription/translation of spoken language to printed text. The electronic translation of spoken language may permit erroneous, or at times, nonsensical words or phrases to be inadvertently transcribed; Although I have reviewed the note for such errors, some may still exist.

## 2024-06-13 PROBLEM — M25.361 KNEE INSTABILITY, RIGHT: Status: ACTIVE | Noted: 2024-05-31

## 2024-06-13 PROBLEM — M17.31 POST-TRAUMATIC OSTEOARTHRITIS OF RIGHT KNEE: Status: ACTIVE | Noted: 2024-05-31

## 2024-06-20 ENCOUNTER — LAB (OUTPATIENT)
Dept: LAB | Facility: HOSPITAL | Age: 44
End: 2024-06-20
Payer: MEDICAID

## 2024-06-20 ENCOUNTER — OFFICE VISIT (OUTPATIENT)
Dept: CARDIOLOGY | Facility: CLINIC | Age: 44
End: 2024-06-20
Payer: MEDICAID

## 2024-06-20 ENCOUNTER — HOSPITAL ENCOUNTER (OUTPATIENT)
Dept: CARDIOLOGY | Facility: HOSPITAL | Age: 44
Discharge: HOME | End: 2024-06-20
Payer: MEDICAID

## 2024-06-20 ENCOUNTER — TRANSCRIBE ORDERS (OUTPATIENT)
Dept: LAB | Facility: HOSPITAL | Age: 44
End: 2024-06-20
Payer: MEDICAID

## 2024-06-20 ENCOUNTER — PRE-ADMISSION TESTING (OUTPATIENT)
Dept: PREADMISSION TESTING | Facility: HOSPITAL | Age: 44
End: 2024-06-20
Payer: MEDICAID

## 2024-06-20 VITALS
HEIGHT: 64 IN | SYSTOLIC BLOOD PRESSURE: 105 MMHG | WEIGHT: 197 LBS | HEART RATE: 85 BPM | DIASTOLIC BLOOD PRESSURE: 71 MMHG | BODY MASS INDEX: 33.63 KG/M2 | OXYGEN SATURATION: 97 %

## 2024-06-20 VITALS
BODY MASS INDEX: 31.62 KG/M2 | WEIGHT: 185.2 LBS | DIASTOLIC BLOOD PRESSURE: 72 MMHG | SYSTOLIC BLOOD PRESSURE: 102 MMHG | OXYGEN SATURATION: 97 % | HEIGHT: 64 IN | TEMPERATURE: 97 F | HEART RATE: 72 BPM | RESPIRATION RATE: 17 BRPM

## 2024-06-20 DIAGNOSIS — Z47.1 AFTERCARE FOLLOWING RIGHT KNEE JOINT REPLACEMENT SURGERY: Primary | ICD-10-CM

## 2024-06-20 DIAGNOSIS — I10 ESSENTIAL HYPERTENSION: Primary | Chronic | ICD-10-CM

## 2024-06-20 DIAGNOSIS — Z13.1 SCREENING FOR DIABETES MELLITUS: ICD-10-CM

## 2024-06-20 DIAGNOSIS — F17.200 TOBACCO DEPENDENCE: Chronic | ICD-10-CM

## 2024-06-20 DIAGNOSIS — F17.209 NICOTINE-RELATED DISORDER: ICD-10-CM

## 2024-06-20 DIAGNOSIS — R53.83 TIREDNESS: ICD-10-CM

## 2024-06-20 DIAGNOSIS — E78.2 MIXED HYPERLIPIDEMIA: ICD-10-CM

## 2024-06-20 DIAGNOSIS — I10 ESSENTIAL HYPERTENSION, MALIGNANT: ICD-10-CM

## 2024-06-20 DIAGNOSIS — E78.5 HYPERLIPIDEMIA, UNSPECIFIED HYPERLIPIDEMIA TYPE: ICD-10-CM

## 2024-06-20 DIAGNOSIS — Z13.1 SCREENING FOR DIABETES MELLITUS: Primary | ICD-10-CM

## 2024-06-20 DIAGNOSIS — Z96.651 AFTERCARE FOLLOWING RIGHT KNEE JOINT REPLACEMENT SURGERY: Primary | ICD-10-CM

## 2024-06-20 DIAGNOSIS — M25.361 KNEE INSTABILITY, RIGHT: ICD-10-CM

## 2024-06-20 DIAGNOSIS — M17.31 POST-TRAUMATIC OSTEOARTHRITIS OF RIGHT KNEE: ICD-10-CM

## 2024-06-20 LAB
25(OH)D3 SERPL-MCNC: 10.2 NG/ML (ref 30–100)
ABO GROUP BLD: NORMAL
ALBUMIN SERPL-MCNC: 4.1 G/DL (ref 3.5–5.2)
ALBUMIN/GLOB SERPL: 1.5 G/DL
ALP SERPL-CCNC: 90 U/L (ref 39–117)
ALT SERPL W P-5'-P-CCNC: 12 U/L (ref 1–33)
ANION GAP SERPL CALCULATED.3IONS-SCNC: 11.1 MMOL/L (ref 5–15)
AST SERPL-CCNC: 11 U/L (ref 1–32)
BASOPHILS # BLD AUTO: 0.07 10*3/MM3 (ref 0–0.2)
BASOPHILS NFR BLD AUTO: 0.6 % (ref 0–1.5)
BILIRUB SERPL-MCNC: 0.3 MG/DL (ref 0–1.2)
BILIRUB UR QL STRIP: NEGATIVE
BLD GP AB SCN SERPL QL: NEGATIVE
BUN SERPL-MCNC: 6 MG/DL (ref 6–20)
BUN/CREAT SERPL: 8.1 (ref 7–25)
CALCIUM SPEC-SCNC: 9.4 MG/DL (ref 8.6–10.5)
CHLORIDE SERPL-SCNC: 102 MMOL/L (ref 98–107)
CHOLEST SERPL-MCNC: 130 MG/DL (ref 0–200)
CLARITY UR: CLEAR
CO2 SERPL-SCNC: 25.9 MMOL/L (ref 22–29)
COLOR UR: YELLOW
CREAT SERPL-MCNC: 0.74 MG/DL (ref 0.57–1)
DEPRECATED RDW RBC AUTO: 41.4 FL (ref 37–54)
EGFRCR SERPLBLD CKD-EPI 2021: 103.1 ML/MIN/1.73
EOSINOPHIL # BLD AUTO: 0.16 10*3/MM3 (ref 0–0.4)
EOSINOPHIL NFR BLD AUTO: 1.3 % (ref 0.3–6.2)
ERYTHROCYTE [DISTWIDTH] IN BLOOD BY AUTOMATED COUNT: 13.3 % (ref 12.3–15.4)
GLOBULIN UR ELPH-MCNC: 2.7 GM/DL
GLUCOSE SERPL-MCNC: 94 MG/DL (ref 65–99)
GLUCOSE UR STRIP-MCNC: NEGATIVE MG/DL
HBA1C MFR BLD: 5.7 % (ref 4.8–5.6)
HCT VFR BLD AUTO: 44.7 % (ref 34–46.6)
HDLC SERPL-MCNC: 38 MG/DL (ref 40–60)
HGB BLD-MCNC: 14.9 G/DL (ref 12–15.9)
HGB UR QL STRIP.AUTO: NEGATIVE
HOLD SPECIMEN: NORMAL
IMM GRANULOCYTES # BLD AUTO: 0.05 10*3/MM3 (ref 0–0.05)
IMM GRANULOCYTES NFR BLD AUTO: 0.4 % (ref 0–0.5)
KETONES UR QL STRIP: NEGATIVE
LDLC SERPL CALC-MCNC: 58 MG/DL (ref 0–100)
LDLC/HDLC SERPL: 1.32 {RATIO}
LEUKOCYTE ESTERASE UR QL STRIP.AUTO: NEGATIVE
LYMPHOCYTES # BLD AUTO: 2.99 10*3/MM3 (ref 0.7–3.1)
LYMPHOCYTES NFR BLD AUTO: 23.6 % (ref 19.6–45.3)
MCH RBC QN AUTO: 28.7 PG (ref 26.6–33)
MCHC RBC AUTO-ENTMCNC: 33.3 G/DL (ref 31.5–35.7)
MCV RBC AUTO: 86.1 FL (ref 79–97)
MONOCYTES # BLD AUTO: 0.69 10*3/MM3 (ref 0.1–0.9)
MONOCYTES NFR BLD AUTO: 5.5 % (ref 5–12)
MRSA DNA SPEC QL NAA+PROBE: NORMAL
NEUTROPHILS NFR BLD AUTO: 68.6 % (ref 42.7–76)
NEUTROPHILS NFR BLD AUTO: 8.69 10*3/MM3 (ref 1.7–7)
NITRITE UR QL STRIP: NEGATIVE
NRBC BLD AUTO-RTO: 0 /100 WBC (ref 0–0.2)
PH UR STRIP.AUTO: 5.5 [PH] (ref 5–8)
PLATELET # BLD AUTO: 370 10*3/MM3 (ref 140–450)
PMV BLD AUTO: 10.2 FL (ref 6–12)
POTASSIUM SERPL-SCNC: 3.9 MMOL/L (ref 3.5–5.2)
PROT SERPL-MCNC: 6.8 G/DL (ref 6–8.5)
PROT UR QL STRIP: NEGATIVE
RBC # BLD AUTO: 5.19 10*6/MM3 (ref 3.77–5.28)
RH BLD: POSITIVE
SODIUM SERPL-SCNC: 139 MMOL/L (ref 136–145)
SP GR UR STRIP: 1.02 (ref 1–1.03)
T&S EXPIRATION DATE: NORMAL
TRIGL SERPL-MCNC: 210 MG/DL (ref 0–150)
TSH SERPL DL<=0.05 MIU/L-ACNC: 1.31 UIU/ML (ref 0.27–4.2)
UROBILINOGEN UR QL STRIP: NORMAL
VIT B12 BLD-MCNC: 286 PG/ML (ref 211–946)
VLDLC SERPL-MCNC: 34 MG/DL (ref 5–40)
WBC NRBC COR # BLD AUTO: 12.65 10*3/MM3 (ref 3.4–10.8)

## 2024-06-20 PROCEDURE — 82607 VITAMIN B-12: CPT

## 2024-06-20 PROCEDURE — 86901 BLOOD TYPING SEROLOGIC RH(D): CPT

## 2024-06-20 PROCEDURE — 93000 ELECTROCARDIOGRAM COMPLETE: CPT | Performed by: NURSE PRACTITIONER

## 2024-06-20 PROCEDURE — 86900 BLOOD TYPING SEROLOGIC ABO: CPT

## 2024-06-20 PROCEDURE — 1159F MED LIST DOCD IN RCRD: CPT | Performed by: NURSE PRACTITIONER

## 2024-06-20 PROCEDURE — 80050 GENERAL HEALTH PANEL: CPT

## 2024-06-20 PROCEDURE — 93005 ELECTROCARDIOGRAM TRACING: CPT

## 2024-06-20 PROCEDURE — 99213 OFFICE O/P EST LOW 20 MIN: CPT | Performed by: NURSE PRACTITIONER

## 2024-06-20 PROCEDURE — 83036 HEMOGLOBIN GLYCOSYLATED A1C: CPT

## 2024-06-20 PROCEDURE — 93010 ELECTROCARDIOGRAM REPORT: CPT | Performed by: INTERNAL MEDICINE

## 2024-06-20 PROCEDURE — 86850 RBC ANTIBODY SCREEN: CPT

## 2024-06-20 PROCEDURE — 87641 MR-STAPH DNA AMP PROBE: CPT

## 2024-06-20 PROCEDURE — 1160F RVW MEDS BY RX/DR IN RCRD: CPT | Performed by: NURSE PRACTITIONER

## 2024-06-20 PROCEDURE — 80061 LIPID PANEL: CPT

## 2024-06-20 PROCEDURE — 36415 COLL VENOUS BLD VENIPUNCTURE: CPT

## 2024-06-20 PROCEDURE — 3074F SYST BP LT 130 MM HG: CPT | Performed by: NURSE PRACTITIONER

## 2024-06-20 PROCEDURE — 81003 URINALYSIS AUTO W/O SCOPE: CPT

## 2024-06-20 PROCEDURE — 3078F DIAST BP <80 MM HG: CPT | Performed by: NURSE PRACTITIONER

## 2024-06-20 PROCEDURE — 82306 VITAMIN D 25 HYDROXY: CPT

## 2024-06-20 RX ORDER — FAMOTIDINE 40 MG/1
40 TABLET, FILM COATED ORAL DAILY
COMMUNITY
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
## 2024-06-24 ENCOUNTER — TELEPHONE (OUTPATIENT)
Dept: PAIN MEDICINE | Facility: CLINIC | Age: 44
End: 2024-06-24
Payer: MEDICAID

## 2024-06-24 NOTE — TELEPHONE ENCOUNTER
Pt called in stating that she's scheduled for a complete knee replacement Thurs (@ [facility]).   Do you prefer for the surgeon to take over the pain medication or to only write for breakthrough pain and pt continue taking what you prescribe?

## 2024-06-26 ENCOUNTER — ANESTHESIA EVENT (OUTPATIENT)
Dept: PERIOP | Facility: HOSPITAL | Age: 44
End: 2024-06-26
Payer: MEDICAID

## 2024-06-27 ENCOUNTER — ANESTHESIA (OUTPATIENT)
Dept: PERIOP | Facility: HOSPITAL | Age: 44
End: 2024-06-27
Payer: MEDICAID

## 2024-06-27 ENCOUNTER — APPOINTMENT (OUTPATIENT)
Dept: GENERAL RADIOLOGY | Facility: HOSPITAL | Age: 44
End: 2024-06-27
Payer: MEDICAID

## 2024-06-27 ENCOUNTER — HOSPITAL ENCOUNTER (OUTPATIENT)
Facility: HOSPITAL | Age: 44
Setting detail: HOSPITAL OUTPATIENT SURGERY
Discharge: HOME | End: 2024-06-27
Attending: INTERNAL MEDICINE | Admitting: INTERNAL MEDICINE
Payer: MEDICAID

## 2024-06-27 VITALS
TEMPERATURE: 97.2 F | RESPIRATION RATE: 15 BRPM | SYSTOLIC BLOOD PRESSURE: 114 MMHG | DIASTOLIC BLOOD PRESSURE: 78 MMHG | HEART RATE: 62 BPM | OXYGEN SATURATION: 96 %

## 2024-06-27 DIAGNOSIS — M17.31 POST-TRAUMATIC OSTEOARTHRITIS OF RIGHT KNEE: ICD-10-CM

## 2024-06-27 DIAGNOSIS — M25.361 KNEE INSTABILITY, RIGHT: ICD-10-CM

## 2024-06-27 DIAGNOSIS — Z96.651 STATUS POST RIGHT KNEE REPLACEMENT: Primary | ICD-10-CM

## 2024-06-27 PROCEDURE — C1713 ANCHOR/SCREW BN/BN,TIS/BN: HCPCS | Performed by: INTERNAL MEDICINE

## 2024-06-27 PROCEDURE — 25810000003 LACTATED RINGERS PER 1000 ML: Performed by: NURSE ANESTHETIST, CERTIFIED REGISTERED

## 2024-06-27 PROCEDURE — 25810000003 LACTATED RINGERS SOLUTION: Performed by: INTERNAL MEDICINE

## 2024-06-27 PROCEDURE — 25010000002 FENTANYL CITRATE (PF) 100 MCG/2ML SOLUTION: Performed by: ANESTHESIOLOGY

## 2024-06-27 PROCEDURE — 25010000002 ROPIVACAINE PER 1 MG: Performed by: INTERNAL MEDICINE

## 2024-06-27 PROCEDURE — 25010000002 PROPOFOL 200 MG/20ML EMULSION: Performed by: NURSE ANESTHETIST, CERTIFIED REGISTERED

## 2024-06-27 PROCEDURE — C1776 JOINT DEVICE (IMPLANTABLE): HCPCS | Performed by: INTERNAL MEDICINE

## 2024-06-27 PROCEDURE — 97161 PT EVAL LOW COMPLEX 20 MIN: CPT

## 2024-06-27 PROCEDURE — 27447 TOTAL KNEE ARTHROPLASTY: CPT | Performed by: INTERNAL MEDICINE

## 2024-06-27 PROCEDURE — 25010000002 KETOROLAC TROMETHAMINE PER 15 MG: Performed by: INTERNAL MEDICINE

## 2024-06-27 PROCEDURE — 25010000002 PROPOFOL 10 MG/ML EMULSION: Performed by: NURSE ANESTHETIST, CERTIFIED REGISTERED

## 2024-06-27 PROCEDURE — 25010000002 MIDAZOLAM PER 1 MG: Performed by: ANESTHESIOLOGY

## 2024-06-27 PROCEDURE — 25010000002 PHENYLEPHRINE 10 MG/ML SOLUTION: Performed by: NURSE ANESTHETIST, CERTIFIED REGISTERED

## 2024-06-27 PROCEDURE — 25010000002 CEFAZOLIN PER 500 MG: Performed by: INTERNAL MEDICINE

## 2024-06-27 PROCEDURE — 25010000002 VANCOMYCIN HCL 1.25 G RECONSTITUTED SOLUTION 1 EACH VIAL: Performed by: INTERNAL MEDICINE

## 2024-06-27 PROCEDURE — 20985 CPTR-ASST DIR MS PX: CPT | Performed by: INTERNAL MEDICINE

## 2024-06-27 PROCEDURE — 25010000002 CLONIDINE PER 1 MG: Performed by: INTERNAL MEDICINE

## 2024-06-27 PROCEDURE — 25010000002 BUPIVACAINE PF 0.75 % SOLUTION: Performed by: ANESTHESIOLOGY

## 2024-06-27 PROCEDURE — 25010000002 EPINEPHRINE 1 MG/ML SOLUTION: Performed by: INTERNAL MEDICINE

## 2024-06-27 PROCEDURE — 25810000003 SODIUM CHLORIDE 0.9 % SOLUTION 250 ML FLEX CONT: Performed by: INTERNAL MEDICINE

## 2024-06-27 PROCEDURE — 25010000002 ROPIVACAINE PER 1 MG: Performed by: ANESTHESIOLOGY

## 2024-06-27 PROCEDURE — 73560 X-RAY EXAM OF KNEE 1 OR 2: CPT

## 2024-06-27 DEVICE — GENESIS II NON-POROUS TIBIAL                                    BASEPLATE SIZE 2 RIGHT
Type: IMPLANTABLE DEVICE | Site: KNEE | Status: FUNCTIONAL
Brand: GENESIS II

## 2024-06-27 DEVICE — DEV CONTRL TISS STRATAFIX SPIRAL MNCRYL UD 3/0 PLS 30CM: Type: IMPLANTABLE DEVICE | Site: KNEE | Status: FUNCTIONAL

## 2024-06-27 DEVICE — LEGION PS HIGH FLEX XLPE SZ 1-2 11MM
Type: IMPLANTABLE DEVICE | Site: KNEE | Status: FUNCTIONAL
Brand: LEGION

## 2024-06-27 DEVICE — IMPLANTABLE DEVICE: Type: IMPLANTABLE DEVICE | Site: KNEE | Status: FUNCTIONAL

## 2024-06-27 DEVICE — DEV WND/CLS CONTRL TISS STRATAFIX SYMM PDS PLS CTX 60CM VIL: Type: IMPLANTABLE DEVICE | Site: KNEE | Status: FUNCTIONAL

## 2024-06-27 DEVICE — CMT BONE PALACOS R HI/VISC 1X40: Type: IMPLANTABLE DEVICE | Site: KNEE | Status: FUNCTIONAL

## 2024-06-27 DEVICE — LEGION NARROW POSTERIOR STABILIZED                                    OXINIUM SIZE 4N RIGHT
Type: IMPLANTABLE DEVICE | Site: KNEE | Status: FUNCTIONAL
Brand: LEGION

## 2024-06-27 RX ORDER — TRANEXAMIC ACID 10 MG/ML
1000 INJECTION, SOLUTION INTRAVENOUS ONCE
Status: DISCONTINUED | OUTPATIENT
Start: 2024-06-27 | End: 2024-06-27 | Stop reason: HOSPADM

## 2024-06-27 RX ORDER — DIPHENHYDRAMINE HYDROCHLORIDE 50 MG/ML
12.5 INJECTION INTRAMUSCULAR; INTRAVENOUS
Status: DISCONTINUED | OUTPATIENT
Start: 2024-06-27 | End: 2024-06-27 | Stop reason: HOSPADM

## 2024-06-27 RX ORDER — ONDANSETRON 2 MG/ML
4 INJECTION INTRAMUSCULAR; INTRAVENOUS ONCE AS NEEDED
Status: DISCONTINUED | OUTPATIENT
Start: 2024-06-27 | End: 2024-06-27 | Stop reason: HOSPADM

## 2024-06-27 RX ORDER — MIDAZOLAM HYDROCHLORIDE 1 MG/ML
INJECTION INTRAMUSCULAR; INTRAVENOUS
Status: COMPLETED | OUTPATIENT
Start: 2024-06-27 | End: 2024-06-27

## 2024-06-27 RX ORDER — PHENYLEPHRINE HYDROCHLORIDE 10 MG/ML
INJECTION INTRAVENOUS AS NEEDED
Status: DISCONTINUED | OUTPATIENT
Start: 2024-06-27 | End: 2024-06-27 | Stop reason: SURG

## 2024-06-27 RX ORDER — NALOXONE HCL 0.4 MG/ML
0.2 VIAL (ML) INJECTION AS NEEDED
Status: DISCONTINUED | OUTPATIENT
Start: 2024-06-27 | End: 2024-06-27 | Stop reason: HOSPADM

## 2024-06-27 RX ORDER — OXYCODONE HYDROCHLORIDE 5 MG/1
7.5 TABLET ORAL ONCE
Status: COMPLETED | OUTPATIENT
Start: 2024-06-27 | End: 2024-06-27

## 2024-06-27 RX ORDER — PROPOFOL 10 MG/ML
INJECTION, EMULSION INTRAVENOUS AS NEEDED
Status: DISCONTINUED | OUTPATIENT
Start: 2024-06-27 | End: 2024-06-27 | Stop reason: SURG

## 2024-06-27 RX ORDER — DROPERIDOL 2.5 MG/ML
0.62 INJECTION, SOLUTION INTRAMUSCULAR; INTRAVENOUS
Status: DISCONTINUED | OUTPATIENT
Start: 2024-06-27 | End: 2024-06-27 | Stop reason: HOSPADM

## 2024-06-27 RX ORDER — BUPIVACAINE HYDROCHLORIDE 7.5 MG/ML
INJECTION, SOLUTION EPIDURAL; RETROBULBAR AS NEEDED
Status: DISCONTINUED | OUTPATIENT
Start: 2024-06-27 | End: 2024-06-27 | Stop reason: SURG

## 2024-06-27 RX ORDER — FENTANYL CITRATE 50 UG/ML
50 INJECTION, SOLUTION INTRAMUSCULAR; INTRAVENOUS
Status: DISCONTINUED | OUTPATIENT
Start: 2024-06-27 | End: 2024-06-27 | Stop reason: HOSPADM

## 2024-06-27 RX ORDER — MIDAZOLAM HYDROCHLORIDE 1 MG/ML
INJECTION INTRAMUSCULAR; INTRAVENOUS AS NEEDED
Status: DISCONTINUED | OUTPATIENT
Start: 2024-06-27 | End: 2024-06-27

## 2024-06-27 RX ORDER — IPRATROPIUM BROMIDE AND ALBUTEROL SULFATE 2.5; .5 MG/3ML; MG/3ML
3 SOLUTION RESPIRATORY (INHALATION) ONCE AS NEEDED
Status: DISCONTINUED | OUTPATIENT
Start: 2024-06-27 | End: 2024-06-27 | Stop reason: HOSPADM

## 2024-06-27 RX ORDER — ONDANSETRON 4 MG/1
4 TABLET, FILM COATED ORAL EVERY 8 HOURS PRN
Qty: 15 TABLET | Refills: 0 | Status: SHIPPED | OUTPATIENT
Start: 2024-06-27

## 2024-06-27 RX ORDER — ROPIVACAINE HYDROCHLORIDE 5 MG/ML
INJECTION, SOLUTION EPIDURAL; INFILTRATION; PERINEURAL
Status: COMPLETED | OUTPATIENT
Start: 2024-06-27 | End: 2024-06-27

## 2024-06-27 RX ORDER — PROMETHAZINE HYDROCHLORIDE 25 MG/1
25 TABLET ORAL ONCE AS NEEDED
Status: DISCONTINUED | OUTPATIENT
Start: 2024-06-27 | End: 2024-06-27 | Stop reason: HOSPADM

## 2024-06-27 RX ORDER — AMOXICILLIN 250 MG
1 CAPSULE ORAL DAILY
Qty: 30 TABLET | Refills: 0 | Status: SHIPPED | OUTPATIENT
Start: 2024-06-27

## 2024-06-27 RX ORDER — SODIUM CHLORIDE, SODIUM LACTATE, POTASSIUM CHLORIDE, CALCIUM CHLORIDE 600; 310; 30; 20 MG/100ML; MG/100ML; MG/100ML; MG/100ML
INJECTION, SOLUTION INTRAVENOUS CONTINUOUS PRN
Status: DISCONTINUED | OUTPATIENT
Start: 2024-06-27 | End: 2024-06-27 | Stop reason: SURG

## 2024-06-27 RX ORDER — CHLORHEXIDINE GLUCONATE 500 MG/1
CLOTH TOPICAL ONCE
Status: DISCONTINUED | OUTPATIENT
Start: 2024-06-27 | End: 2024-06-27 | Stop reason: HOSPADM

## 2024-06-27 RX ORDER — PREGABALIN 75 MG/1
150 CAPSULE ORAL ONCE
Status: COMPLETED | OUTPATIENT
Start: 2024-06-27 | End: 2024-06-27

## 2024-06-27 RX ORDER — MELOXICAM 15 MG/1
15 TABLET ORAL ONCE
Status: COMPLETED | OUTPATIENT
Start: 2024-06-27 | End: 2024-06-27

## 2024-06-27 RX ORDER — TRANEXAMIC ACID 10 MG/ML
1000 INJECTION, SOLUTION INTRAVENOUS ONCE
Status: COMPLETED | OUTPATIENT
Start: 2024-06-27 | End: 2024-06-27

## 2024-06-27 RX ORDER — PROMETHAZINE HYDROCHLORIDE 25 MG/1
25 SUPPOSITORY RECTAL ONCE AS NEEDED
Status: DISCONTINUED | OUTPATIENT
Start: 2024-06-27 | End: 2024-06-27 | Stop reason: HOSPADM

## 2024-06-27 RX ORDER — FENTANYL CITRATE 50 UG/ML
INJECTION, SOLUTION INTRAMUSCULAR; INTRAVENOUS AS NEEDED
Status: DISCONTINUED | OUTPATIENT
Start: 2024-06-27 | End: 2024-06-27 | Stop reason: SURG

## 2024-06-27 RX ORDER — ASPIRIN 81 MG/1
81 TABLET ORAL 2 TIMES DAILY
Qty: 60 TABLET | Refills: 0 | Status: SHIPPED | OUTPATIENT
Start: 2024-06-27

## 2024-06-27 RX ORDER — ASPIRIN 81 MG/1
81 TABLET ORAL EVERY 12 HOURS SCHEDULED
Status: CANCELLED | OUTPATIENT
Start: 2024-06-28

## 2024-06-27 RX ORDER — FLUMAZENIL 0.1 MG/ML
0.2 INJECTION INTRAVENOUS AS NEEDED
Status: DISCONTINUED | OUTPATIENT
Start: 2024-06-27 | End: 2024-06-27 | Stop reason: HOSPADM

## 2024-06-27 RX ADMIN — PROPOFOL 120 MCG/KG/MIN: 10 INJECTION, EMULSION INTRAVENOUS at 09:06

## 2024-06-27 RX ADMIN — SODIUM CHLORIDE, SODIUM LACTATE, POTASSIUM CHLORIDE, AND CALCIUM CHLORIDE: .6; .31; .03; .02 INJECTION, SOLUTION INTRAVENOUS at 09:01

## 2024-06-27 RX ADMIN — MIDAZOLAM 4 MG: 1 INJECTION INTRAMUSCULAR; INTRAVENOUS at 08:43

## 2024-06-27 RX ADMIN — SODIUM CHLORIDE, POTASSIUM CHLORIDE, SODIUM LACTATE AND CALCIUM CHLORIDE 500 ML: 600; 310; 30; 20 INJECTION, SOLUTION INTRAVENOUS at 08:05

## 2024-06-27 RX ADMIN — PREGABALIN 150 MG: 75 CAPSULE ORAL at 08:16

## 2024-06-27 RX ADMIN — BUPIVACAINE HYDROCHLORIDE 1.6 ML: 7.5 INJECTION, SOLUTION EPIDURAL; RETROBULBAR at 08:51

## 2024-06-27 RX ADMIN — MELOXICAM 15 MG: 15 TABLET ORAL at 08:16

## 2024-06-27 RX ADMIN — SODIUM CHLORIDE 1250 MG: 9 INJECTION, SOLUTION INTRAVENOUS at 08:05

## 2024-06-27 RX ADMIN — TRANEXAMIC ACID 1000 MG: 10 INJECTION, SOLUTION INTRAVENOUS at 10:27

## 2024-06-27 RX ADMIN — CEFAZOLIN 2 G: 2 INJECTION, POWDER, FOR SOLUTION INTRAMUSCULAR; INTRAVENOUS at 09:13

## 2024-06-27 RX ADMIN — ROPIVACAINE HYDROCHLORIDE 30 ML: 5 INJECTION EPIDURAL; INFILTRATION; PERINEURAL at 08:43

## 2024-06-27 RX ADMIN — PROPOFOL 40 MG: 10 INJECTION, EMULSION INTRAVENOUS at 09:06

## 2024-06-27 RX ADMIN — OXYCODONE HYDROCHLORIDE 7.5 MG: 5 TABLET ORAL at 14:15

## 2024-06-27 RX ADMIN — TRANEXAMIC ACID 1000 MG: 10 INJECTION, SOLUTION INTRAVENOUS at 09:13

## 2024-06-27 RX ADMIN — PHENYLEPHRINE HYDROCHLORIDE 100 MCG: 10 INJECTION INTRAVENOUS at 09:31

## 2024-06-27 RX ADMIN — FENTANYL CITRATE 25 MCG: 50 INJECTION, SOLUTION INTRAMUSCULAR; INTRAVENOUS at 08:51
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
## 2024-06-27 NOTE — ANESTHESIA PREPROCEDURE EVALUATION
Anesthesia Evaluation     Patient summary reviewed, Nursing notes reviewed and pregnancy test completed   NPO Solid Status: > 8 hours  NPO Liquid Status: > 2 hours           Airway   Mallampati: II  TM distance: >3 FB  Neck ROM: full  No difficulty expected  Dental    (+) upper dentures and partials    Pulmonary - normal exam   (+) a smoker,  Cardiovascular - normal exam    (+) hypertension, angina, DVT resolved, hyperlipidemia      Neuro/Psych  (+) headaches  GI/Hepatic/Renal/Endo    (+) obesity, GERD well controlled    Musculoskeletal     (+) arthralgias, chronic pain  Abdominal   (+) obese   Substance History      OB/GYN          Other   arthritis,     ROS/Med Hx Other: 10/20  · Myocardial perfusion imaging indicates a normal myocardial perfusion study with no evidence of ischemia.  · Left ventricular ejection fraction is normal. (Calculated EF = 50%).  · Findings consistent with a normal ECG stress test.  · Impressions are consistent with a low risk study.  · There is no prior study available for comparison.                Anesthesia Plan    ASA 2     MAC, regional and spinal     intravenous induction     Anesthetic plan, risks, benefits, and alternatives have been provided, discussed and informed consent has been obtained with: patient.  Pre-procedure education provided  Use of blood products discussed with patient .    Plan discussed with CRNA.    CODE STATUS:

## 2024-06-27 NOTE — DISCHARGE INSTRUCTIONS
Total Knee Joint Replacement Discharge Instructions:    I. ACTIVITIES:  1. Exercises:  Complete exercise program as taught by the hospital physical therapist 2 times per day  Exercise program will be advanced by the physical therapist  During the day be up ambulating every 2 hours (while awake) for short distances  Complete the ankle pump exercises at least 10 times per hour (while awake)  Elevate legs most of the day the first week post operatively and thereafter elevate legs when in bed and for at least 30 minutes during the day. Caution must be taken to avoid pillow placement under the bend of the knee as this can led to flexion contractures of the knee.  Use cold packs 20-30 minutes approximately 5 times per day. This should be done before and after completing your exercises and at any time you are experiencing pain/ stiffness in your operative extremity.      2. Activities of Daily Living:  No tub baths, hot tubs, or swimming pools for 4 weeks  May shower and let water run over the incision on post-operative day #5 if no drainage. Do not scrub or rub the incision. Simply let the water run over the incision and pat dry.    II. Restrictions  Do not cross legs or kneel  Your surgeon will discuss with you when you will be able to drive again.  Weight bearing is as tolerated  First week stay inside on even terrain. May go up and down stairs one stair at a time utilizing the hand rail  After one week, you may venture outside.    III. Precautions:  Everyone that comes near you should wash their hands  No elective dental, genital-urinary, or colon procedures or surgical procedures for 12 weeks after surgery unless absolutely necessary.   If dental work or surgical procedure is deemed absolutely necessary, you will need to contact your surgeon as you will need to take antibiotics 1 hour prior to any dental work (including teeth cleanings).  Please discuss with your surgeon prophylactic antibiotics as the length of time  this intervention will be necessary for you varies with each patient’s health history and situation.  Avoid sick people. If you must be around someone who is ill, they should wear a mask.  Avoid visits to the Emergency Room or Urgent Care unless you are having a life threatening event.   If ordered stockings are to be placed on in the morning and removed at night. Monitor the stockings to ensure that any swelling is not causing the stockings to become too tight. In this case, remove stockings immediately.    IV. INCISION CARE:  May remove the Ace wrap 2 days following surgery  The negative pressure dressing with the battery pack is waterproof and should remain in place for 7 days.  You may shower with the negative pressure dressing as it is waterproof  Following removal of the dressing on day 7 you may shower and allow water to run over the incision  No submersion of incision in water such as tubs pools hot tubs etc.  No creams or ointments to the incision  Do not touch or pick at the incision  Check incision/dressing every day and notify surgeon immediately if any of the following signs or symptoms are noted:  Increase in redness  Increase in swelling around the incision and of the entire extremity  Increase in pain  Drainage oozing from the incision  Pulling apart of the edges of the incision  Increase in overall body temperature (greater than 100.5 degrees)  Your surgeon will instruct you regarding suture or staple removal    V. Medications:   1. Anticoagulants: You will be discharged on an anticoagulant. This is a prophylactic medication that helps prevent blood clots during your post-operative period. The type and length of dosage varies based on your individual needs, procedure performed, and surgeon’s preference.  While taking the anticoagulant, you should avoid taking any additional aspirin, ibuprofen (Advil or Motrin), Aleve (Naprosyn) or other non-steroidal anti-inflammatory medications.   Notify surgeon  immediately if any cj bleeding is noted in the urine, stool, emesis, or from the nose or the incision. Blood in the stool will often appear as black rather than red. Blood in urine may appear as pink. Blood in emesis may appear as brown/black like coffee grounds.  You will need to apply pressure for longer periods of time to any cuts or abrasions to stop bleeding  Avoid alcohol while taking anticoagulants    2. Stool Softeners: You will be at greater risk of constipation after surgery due to being less mobile and the pain medications.   Take stool softeners as instructed by your surgeon while on pain medications. Over the counter Colace 100 mg 1-2 capsules twice daily.   If stools become too loose or too frequent, please decreases the dosage or stop the stool softener.  If constipation occurs despite use of stool softeners, you are to continue the stool softeners and add a laxative (Milk of Magnesia 1 ounce daily as needed)  Drink plenty of fluids, and eat fruits and vegetables during your recovery time    3. Pain Medications utilized after surgery are narcotics and the law requires that the following information be given to all patients that are prescribed narcotics:  CLASSIFICATION: Pain medications are called Opioids and are narcotics  LEGALITIES: It is illegal to share narcotics with others and to drive within 24 hours of taking narcotics  POTENTIAL SIDE EFFECTS: Potential side effects of opioids include: nausea, vomiting, itching, dizziness, drowsiness, dry mouth, constipation, and difficulty urinating.  POTENTIAL ADVERSE EFFECTS:   Opioid tolerance can develop with use of pain medications and this simply means that it requires more and more of the medication to control pain; however, this is seen more in patients that use opioids for longer periods of time.  Opioid dependence can develop with use of Opioids and this simply means that to stop the medication can cause withdrawal symptoms; however, this is  seen with patients that use Opioids for longer periods of time.  Opioid addiction can develop with use of Opioids and the incidence of this is very unlikely in patients who take the medications as ordered and stop the medications as instructed.  Opioid overdose can be dangerous, but is unlikely when the medication is taken as ordered and stopped when ordered. It is important not to mix opioids with alcohol or with and type of sedative such as Benadryl as this can lead to over sedation and respiratory difficulty.  DOSAGE:   Pain medications will need to be taken consistently for the first week to decrease pain and promote adequate pain relief and participation in physical therapy.  After the initial surgical pain begins to resolve, you may begin to decrease the pain medication. By the end of 6 weeks, you should be off of pain medications.  Refills will not be given by the office during evening hours, on weekends, or after 6 weeks post-op.  To seek refills on pain medications during the initial 6 week post-operative period, you must call the office 48 hours in advance to request the refill. The office will then notify you when to  the prescription. DO NOT wait until you are out of the medication to request a refill.    V. FOLLOW-UP VISITS:  You will need to follow up in the office with your surgeon in 2 weeks. Please call this number [phone] to schedule this appointment.  If you have any concerns or suspected complications prior to your follow up visit, please call your surgeons office. Do not wait until your appointment time if you suspect complications. These will need to be addressed in the office promptly.

## 2024-06-27 NOTE — OP NOTE
OPERATIVE REPORT    Date of Surgery:   06/27/24    Attending Surgeon:   Emery Jackson MD, MSE    ASSISTANTS:    Assistant: Keith Napier CSA was responsible for performing the following activities: Retraction, Suction, Closing, and Placing Dressing and their skilled assistance was necessary for the success of this case.     PREOPERATIVE DIAGNOSIS:   Right knee osteoarthritis and global ligamentous laxity    POSTOPERATIVE DIAGNOSIS:   Same as above     PROCEDURE:   1. Right primary total knee arthroplasty, with robotic assistance (CORI)    Surgical Approach:  Surgical Approach: Knee Medial Parapatellar        IMPLANTS:   : [employer]  Brand: Legion TKS  Femoral component size: 4 PS narrow  Tibial component size: 2   Patellar Button: none  Bearing type: PS  Insert Thickness: 11 mm    SURGICAL DETAILS:  Preoperative Passive Range of Motion: -18 to 140 degrees  Postoperative Passive Range of Motion: 4 to 136 degrees  Pre Op 5 degrees Valgus,  Post Op 0 degrees Valgus   Incision/arthrotomy type: Medial parapatellar  Posterior Cruciate Ligament:  absent  Lateral release: No  Estimated blood loss: 100 cc  Anesthesia type: Spinal and Regional  Tourniquet: Yes: 250 mmHG     INDICATIONS FOR PROCEDURE:  This patient presents today with end stage degenerative joint disease of the knee. The patient has failed non-operative treatment and presents for total knee replacement. Risks, complications, and benefits of the procedure have been discussed and all questions have been answered preoperatively.    PROCEDURE:  The patient was met in the preoperative holding area, evaluated, consent was obtained, and the Right knee was marked. The patient was brought to the operating room and placed on the operating room table in the supine position. After anesthesia was established, the patient was positioned supine. Bony prominences were padded. The patient was given prophylactic antibiotics within one hour of skin  incision. The involved lower extremity was prepped and draped in usual sterile fashion.  Surgical timeout was taken to confirm the operative side and planned procedure.    A straight incision was used medial to the midline carried through the subcutaneous tissue to the underlying extensor mechanism. A medial parapatellar approach was utilized.  A small medial peel was then performed using Bovie electrocautery and the fat pad was sharply excised. The patella was everted, a lateral facetectomy was performed, marginal osteophytes trimmed,  and the synovial margin was cauterized, clearing excess synovium.  The synovium at the superolateral aspect of the junction of the trochlea and the anterior cortex the femur was removed with Bovie electrocautery and the knee was flexed.  The tourniquet was deflated at this point time.  The ACL was then removed and all overhanging tibial and femoral osteophytes were removed with a rongeur.  The tibial and femoral reference arrays were assembled first. 2 drill tip threaded pins were placed medial to the tibial tubercle within the surgical field. The pins were inserted under power using the drill guide included in the CORI set for their relative positions. Similarly, the 2 femoral pins were placed medially in the femur just proxmial to the medial epicondyle. The sensor arrays were assembled to the pins    The hip center was registered. The medial and lateral malleoli were registered. The femur and tibia were registered.    The knee motion and balance was assessed with standard poses. Pre-operative cut planning was adjusted to provide knee balance throughout the range of motion    We then turned out attention to the distal femur and using the CORI bur the distal femur resection was performed.  2 bur holes were then placed and the distal femoral punch was introduced for the  holes for the 4 in 1 femoral cutting block.     We then turned to the tibia and using the probe with a flat  disc attached to it the tibial cutting block was floated into place using robotic navication. It was held in place with 2 a to p pins and one cross pin.  Depth resection, varus/valgus, and tibial slope were again confirmed. We then performed our tibial resection with a Z retractor medially, bent homman laterally, and PCL retractor posteriorly.  The tibia resection was then removed.    Attention was then turned back to the femur and the appropriately sized 4 in 1 femoral cutting block was malleted into place and secured with 2 convergent threaded pins.     A laminar  was placed and the medial and lateral menisci were excised. Posterior femoral osteophytes were removed with an osteotome. The bovie was used to cauterize the posterior knee capsule and meniscal remnants.     The tibial surface was then sized using the trial baseplate and secured with 2 pins.  Careful attention was taken to ensure it was aligned with the medial third of the tibial tubercle. The trial femur was then impacted into place and the appropriate sized trail poly was inserted.  Knee motion and balance were checked manually and with the robotic assistance. The patella was not resurfaced. The patella tracked well. The femoral lug drills were performed. The trial femoral components and poly were removed. The tracker pins and arrays were removed.    Attention was then turned to the tibial prep.  The keel drill and punch introduced.     The trial tibial implant was removed. All bone was then prepared with lavage and drying for preparation prior to final component placement. The final tibial component was cemented into position and excess cement was removed. The final femoral component was cemented into position and excess cement was removed.   The trial poly was placed. After cement curing, motion and stability was again tested. The final tibial insert was exchanged to the final tibial insert which was impacted into position.  Surgical site was  irrigated with dilute Betadine solution and then normal saline with pulsatile lavage.  The medial and lateral capsular flaps as well as tibial and femoral periosteum was injected with a mixture consisting of 30 mL of 0.5% ropivacaine, 30 mg of ketorolac, and epinephrine 0.2 mg solution diluted in 30 mL of normal saline.    The arthrotomy was closed with #1 PDS stratafix. The subcutaneous tissue was closed with 2-0 monocryl. The skin was closed with running 3-0 monocryl stratafix and dermabond a sterile AUDRA dressing was placed.      The patient tolerated the procedure well and was taken to the recovery room in stable condition.  Following completion of the surgery all sponge instrument needle counts were correct x2.    POSTOPERATIVE PLAN:   1. Weightbearing as tolerated on operative extremity  2. DVT prophylaxis    WOUND CLOSURE:  #1 PDS Stratfix  2-0 monocryl subcuticular   3-0 monocryl stratafix skin  dermabond  AUDRA dressing  Ace wrap    SPONGE/INSTRUMENT/NEEDLE COUNTS:   Correct x 2    CONDITION ON DISCHARGE:   Stable    COMPLICATIONS:   none  Emery Jackson MD, MSE

## 2024-06-27 NOTE — PLAN OF CARE
Goal Outcome Evaluation:  Plan of Care Reviewed With: patient, spouse, family           Outcome Evaluation: 45 y/o female s/p R TKR this date due to OA of R knee. PMH Includes htn, R foot arthritis. Patient lives with spouse who is home at all times due to disability. Patient lives in [facility] , no steps to enter home and normally does not use an a.d for mobility. At time of eval, patient with 0-80 degrees R knee flexion and grossly 3/5 R quads mm strength. Patient is able to perform supine<>sit with modified independence. Ambulated 80 ft using rw with shortened step length, slow gait speed and slight limp but no buckling of R knee noted. Instructed patient and family on activity post op , icing, HEP importance of follow up PT sooner versus later. Patient needs rw for home but safe to discharge home with family.      Anticipated Discharge Disposition (PT): home with assist, home with outpatient therapy services

## 2024-06-27 NOTE — ANESTHESIA PROCEDURE NOTES
Peripheral Block    Pre-sedation assessment completed: 6/27/2024 8:35 AM    Patient reassessed immediately prior to procedure    Patient location during procedure: pre-op  Start time: 6/27/2024 8:35 AM  Stop time: 6/27/2024 8:43 AM  Reason for block: at surgeon's request and post-op pain management  Performed by  Anesthesiologist: Jony Rae MD  Preanesthetic Checklist  Completed: patient identified, IV checked, site marked, risks and benefits discussed, surgical consent, monitors and equipment checked, pre-op evaluation and timeout performed  Prep:  Pt Position: sitting  Sterile barriers:alcohol skin prep, cap, gloves, washed/disinfected hands, mask and partial drape  Prep: ChloraPrep  Patient monitoring: blood pressure monitoring, continuous pulse oximetry and EKG  Procedure  Performed under: local infiltration  Guidance:ultrasound guided    ULTRASOUND INTERPRETATION.  Using ultrasound guidance a 22 G gauge needle was placed in close proximity to the femoral and sciatic nerve, at which point, under ultrasound guidance anesthetic was injected in the area of the nerve and spread of the anesthesia was seen on ultrasound in close proximity thereto.  There were no abnormalities seen on ultrasound; a digital image was taken; and the patient tolerated the procedure with no complications. Images:still images obtained, printed/placed on chart    Laterality:right  Block Type:adductor canal block and iPack  Injection Technique:single-shot  Needle Type:echogenic  Needle Gauge:20 G  Resistance on Injection: none  Sedation medications used: midazolam (VERSED) injection - Intravenous   4 mg - 6/27/2024 8:43:00 AM  Medications Used: ropivacaine (NAROPIN) 0.5 % injection - Perineural   30 mL - 6/27/2024 8:43:00 AM      Medications  Preservative Free Saline:20ml    Post Assessment  Injection Assessment: negative aspiration for heme, no paresthesia on injection and incremental injection  Patient Tolerance:comfortable  throughout block  Complications:no  Additional Notes  Precedex 40 mcg added to block solution  Performed by: Jony Rae MD
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
## 2024-06-27 NOTE — ANESTHESIA POSTPROCEDURE EVALUATION
Patient: Vidya Becerra    Procedure Summary       Date: 06/27/24 Room / Location: [facility] OR 06 / [facility] MAIN OR    Anesthesia Start: 0901 Anesthesia Stop: 1056    Procedure: TOTAL KNEE ARTHROPLASTY WITH [employer]I [employer] (Right: Knee) Diagnosis:       Post-traumatic osteoarthritis of right knee      Knee instability, right      (Post-traumatic osteoarthritis of right knee [M17.31])      (Knee instability, right [M25.361])    Surgeons: Emery Jackson MD Provider: Jony Rae MD    Anesthesia Type: MAC, regional, spinal ASA Status: 2            Anesthesia Type: MAC, regional, spinal    Vitals  Vitals Value Taken Time   BP 98/62 06/27/24 1123   Temp 97.2 °F (36.2 °C) 06/27/24 1100   Pulse 81 06/27/24 1125   Resp 15 06/27/24 1115   SpO2 84 % 06/27/24 1125   Vitals shown include unfiled device data.        Post Anesthesia Care and Evaluation    Patient location during evaluation: PACU  Patient participation: complete - patient participated  Level of consciousness: awake  Pain scale: See nurse's notes for pain score.  Pain management: adequate    Airway patency: patent  Anesthetic complications: No anesthetic complications  PONV Status: none  Cardiovascular status: acceptable  Respiratory status: acceptable and spontaneous ventilation  Hydration status: acceptable    Comments: Patient seen and examined postoperatively; vital signs stable; SpO2 greater than or equal to 90%; cardiopulmonary status stable; nausea/vomiting adequately controlled; pain adequately controlled; no apparent anesthesia complications; patient discharged from anesthesia care when discharge criteria were met

## 2024-06-27 NOTE — ANESTHESIA PROCEDURE NOTES
Intrathecal Block    Pre-sedation assessment completed: 6/27/2024 8:46 AM    Patient reassessed immediately prior to procedure    Patient location during procedure: pre-op  Start Time: 6/27/2024 8:46 AM  Stop Time: 6/27/2024 8:54 AM  Indication:at surgeon's request and post-op pain management  Performed By  Anesthesiologist: Jony Rae MD  Preanesthetic Checklist  Completed: patient identified, site marked, surgical consent, pre-op evaluation, timeout performed, IV checked, risks and benefits discussed and monitors and equipment checked  Additional Notes  Bupivacaine 1.6 mL hyperbaric 0.75% with 25 mcg Fentanyl for primary anesthetic.    Intrathecal Block Prep:  Pt Position:sitting  Sterile Tech:cap, mask, sterile barrier and gloves  Prep:chloraprep  Monitoring:blood pressure monitoring, continuous pulse oximetry and EKG  Intrathecal Block Procedure:  Approach:midline  Guidance:palpation technique  Location:L3-L4  Needle Type:Pencan  Needle Gauge:25 G  Placement of Needle Event: cerebrospinal fluid  Fluid Appearance:clear  Post Assessment:  Patient Tolerance:patient tolerated the procedure well with no apparent complications  Complications:no

## 2024-06-28 DIAGNOSIS — G89.18 PAIN AT SURGICAL SITE: Primary | ICD-10-CM

## 2024-06-28 RX ORDER — OXYCODONE AND ACETAMINOPHEN 10; 325 MG/1; MG/1
1 TABLET ORAL EVERY 4 HOURS PRN
Qty: 42 TABLET | Refills: 0 | Status: SHIPPED | OUTPATIENT
Start: 2024-06-28

## 2024-06-28 NOTE — TELEPHONE ENCOUNTER
Pt called in and informed us that the surgeon did not discharge her with any pain medication & does not want to take over it during postop.  After our discussion, I informed pt you will write her a one week RX for breakthrough pain    INSPECT in chart  [facility] in [place]

## 2024-06-29 LAB — QTC INTERVAL: NORMAL MS

## 2024-07-03 ENCOUNTER — TREATMENT (OUTPATIENT)
Dept: PHYSICAL THERAPY | Facility: CLINIC | Age: 44
End: 2024-07-03
Payer: MEDICAID

## 2024-07-03 DIAGNOSIS — Z96.651 S/P TOTAL KNEE ARTHROPLASTY, RIGHT: Primary | ICD-10-CM

## 2024-07-03 DIAGNOSIS — R26.2 DIFFICULTY WALKING: ICD-10-CM

## 2024-07-03 DIAGNOSIS — R26.89 IMBALANCE: ICD-10-CM

## 2024-07-03 DIAGNOSIS — R29.898 RIGHT LEG WEAKNESS: ICD-10-CM
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
## 2024-07-08 ENCOUNTER — OFFICE VISIT (OUTPATIENT)
Dept: ORTHOPEDIC SURGERY | Facility: CLINIC | Age: 44
End: 2024-07-08
Payer: MEDICAID

## 2024-07-08 VITALS — BODY MASS INDEX: 33.63 KG/M2 | HEIGHT: 64 IN | WEIGHT: 197 LBS

## 2024-07-08 DIAGNOSIS — Z96.651 S/P TKR (TOTAL KNEE REPLACEMENT), RIGHT: Primary | ICD-10-CM

## 2024-07-08 PROCEDURE — 99024 POSTOP FOLLOW-UP VISIT: CPT | Performed by: INTERNAL MEDICINE
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
## 2024-07-11 ENCOUNTER — TREATMENT (OUTPATIENT)
Dept: PHYSICAL THERAPY | Facility: CLINIC | Age: 44
End: 2024-07-11
Payer: MEDICAID

## 2024-07-11 DIAGNOSIS — R29.898 RIGHT LEG WEAKNESS: ICD-10-CM

## 2024-07-11 DIAGNOSIS — R26.2 DIFFICULTY WALKING: ICD-10-CM

## 2024-07-11 DIAGNOSIS — Z96.651 S/P TOTAL KNEE ARTHROPLASTY, RIGHT: Primary | ICD-10-CM

## 2024-07-11 DIAGNOSIS — R26.89 IMBALANCE: ICD-10-CM

## 2024-07-11 NOTE — PROGRESS NOTES
Physical Therapy Daily Treatment Note  [address], [address], [place]  [address]    Patient: Vidya Becerra   : 1980  Diagnosis/ICD-10 Code:  S/P total knee arthroplasty, right [Z96.651]   Problems Addressed this Visit    None  Visit Diagnoses       S/P total knee arthroplasty, right    -  Primary    Difficulty walking        Imbalance        Right leg weakness              Diagnoses         Codes Comments    S/P total knee arthroplasty, right    -  Primary ICD-10-CM: Z96.651  ICD-9-CM: V43.65     Difficulty walking     ICD-10-CM: R26.2  ICD-9-CM: 719.7     Imbalance     ICD-10-CM: R26.89  ICD-9-CM: 781.2     Right leg weakness     ICD-10-CM: R29.898  ICD-9-CM: 729.89           Referring practitioner: Emery Jackson MD  Date of Initial Visit: Type: THERAPY  Noted: 7/3/2024  Today's Date: 2024    VISIT#: 2    Subjective   Vidya reports severe pain in R knee persists. She reports compliance with HEP. She saw Dr. Jackson who advised her she should be from 0-120 deg by next f/u (~ 4 wks).     Objective     See Exercise, Manual, and Modality Logs for complete treatment.     R knee lacking 20 deg extension     Assessment/Plan  Vidya cont to exhibit R knee flexion contracture. PT informed pt of soft tissue stiffness from before surgery contributing to R knee feeling tight. She cont to use FWW and was encouraged to use FWW until she is able to amb without limping.   Progress strengthening /stabilization /functional activity         Timed:         Manual Therapy:         mins  57092;     Therapeutic Exercise:    10     mins  10180;     Neuromuscular Annamarie:        mins  23525;    Therapeutic Activity:     15     mins  95207;     Gait Training:      15     mins  11740;     Ultrasound:          mins  92943;    Ionto                                   mins   90101  Self Care                            mins   43732  Tests & Measures              mins   93681  Somali stim                    mins    73634    Un-Timed:  Canalith Repos                   mins  61991  Electrical Stimulation:         mins  12294 ( );  Dry Needling          mins 35690/96498  Traction          mins 65550  Low Eval          Mins  54171  Mod Eval          Mins  02688  High Eval                            Mins  35634  Re-Eval                               mins  98741    Timed Treatment:   40   mins   Total Treatment:     40   mins    Ciarra Serrano, PT, DPT, cert. DN  Physical Therapist  [place] Lic # [id number]

## 2024-07-18 ENCOUNTER — TREATMENT (OUTPATIENT)
Dept: PHYSICAL THERAPY | Facility: CLINIC | Age: 44
End: 2024-07-18
Payer: MEDICAID

## 2024-07-18 DIAGNOSIS — R29.898 RIGHT LEG WEAKNESS: ICD-10-CM

## 2024-07-18 DIAGNOSIS — R26.2 DIFFICULTY WALKING: ICD-10-CM

## 2024-07-18 DIAGNOSIS — Z96.651 S/P TOTAL KNEE ARTHROPLASTY, RIGHT: Primary | ICD-10-CM

## 2024-07-18 DIAGNOSIS — R26.89 IMBALANCE: ICD-10-CM

## 2024-07-18 NOTE — PROGRESS NOTES
Physical Therapy Daily Treatment Note  [address], [address], [place]  [address]    Patient: Vidya MENDOZA Still   : 1980  Diagnosis/ICD-10 Code:  S/P total knee arthroplasty, right [Z96.651]   Problems Addressed this Visit    None  Visit Diagnoses       S/P total knee arthroplasty, right    -  Primary    Difficulty walking        Imbalance        Right leg weakness              Diagnoses         Codes Comments    S/P total knee arthroplasty, right    -  Primary ICD-10-CM: Z96.651  ICD-9-CM: V43.65     Difficulty walking     ICD-10-CM: R26.2  ICD-9-CM: 719.7     Imbalance     ICD-10-CM: R26.89  ICD-9-CM: 781.2     Right leg weakness     ICD-10-CM: R29.898  ICD-9-CM: 729.89           Referring practitioner: Emery Jackson MD  Date of Initial Visit: Type: THERAPY  Noted: 7/3/2024  Today's Date: 2024    VISIT#: 3    Subjective   Vidya reports she has been working on straightening the R knee and it seems to be getting better.     Objective     See Exercise, Manual, and Modality Logs for complete treatment.     R knee PROM seated EOB: 8-108    Assessment/Plan  Vidya is doing significantly better this week compared to last. Her ROM is improving and she is able to tolerate more exercise. Will cont to progress R knee ROM, balance, strength & overall functional mobility.            Timed:         Manual Therapy:    10     mins  12183;     Therapeutic Exercise:    10     mins  98903;     Neuromuscular Annamarie:    6    mins  33546;    Therapeutic Activity:     15     mins  85256;     Gait Training:      15     mins  37065;     Ultrasound:          mins  56319;    Ionto                                   mins   70441  Self Care                            mins   30695  Tests & Measures              mins   67486  Luxembourger stim                    mins   98782    Un-Timed:  Canalith Repos                   mins  27374  Electrical Stimulation:         mins  69298 ( );  Dry Needling          mins  20561/20560  Traction          mins 48767  Low Eval          Mins  90818  Mod Eval          Mins  86158  High Eval                            Mins  00089  Re-Eval                               mins  93284    Timed Treatment:   56   mins   Total Treatment:     56   mins    Ciarra Serrano, PT, DPT, cert. DN  Physical Therapist  [place] Lic # [id number]

## 2024-07-25 ENCOUNTER — TREATMENT (OUTPATIENT)
Dept: PHYSICAL THERAPY | Facility: CLINIC | Age: 44
End: 2024-07-25
Payer: MEDICAID

## 2024-07-25 DIAGNOSIS — R26.2 DIFFICULTY WALKING: ICD-10-CM

## 2024-07-25 DIAGNOSIS — R29.898 RIGHT LEG WEAKNESS: ICD-10-CM

## 2024-07-25 DIAGNOSIS — R26.89 IMBALANCE: ICD-10-CM

## 2024-07-25 DIAGNOSIS — Z96.651 S/P TOTAL KNEE ARTHROPLASTY, RIGHT: Primary | ICD-10-CM

## 2024-07-25 NOTE — PROGRESS NOTES
Physical Therapy Daily Treatment Note  [address], [address], [facility] [place]  [address]    Patient: Vidya MENDOZA Still   : 1980  Diagnosis/ICD-10 Code:  S/P total knee arthroplasty, right [Z96.651]   Problems Addressed this Visit    None  Visit Diagnoses       S/P total knee arthroplasty, right    -  Primary    Difficulty walking        Imbalance        Right leg weakness              Diagnoses         Codes Comments    S/P total knee arthroplasty, right    -  Primary ICD-10-CM: Z96.651  ICD-9-CM: V43.65     Difficulty walking     ICD-10-CM: R26.2  ICD-9-CM: 719.7     Imbalance     ICD-10-CM: R26.89  ICD-9-CM: 781.2     Right leg weakness     ICD-10-CM: R29.898  ICD-9-CM: 729.89           Referring practitioner: Emery Jackson MD  Date of Initial Visit: Type: THERAPY  Noted: 7/3/2024  Today's Date: 2024    VISIT#: 4    Subjective   Vidya reports she has felt much better the past week or so.     Objective     See Exercise, Manual, and Modality Logs for complete treatment.     R knee flexion w/foot on edge of TM: 103 deg    Assessment/Plan  Vidya is making excellent progress in terms of functional mobility & activity tolerance. She is still amb w/FWW but indicates she will transition to SPC next week. She exhibits normalized gait pattern with FWW indicating appropriate time to transition.  Progress strengthening /stabilization /functional activity         Timed:         Manual Therapy:         mins  38254;     Therapeutic Exercise:    10     mins  19031;     Neuromuscular Annamarie:    9    mins  50265;    Therapeutic Activity:     20     mins  32328;     Gait Training:           mins  97588;     Ultrasound:          mins  70302;    Ionto                                   mins   15270  Self Care                            mins   30827  Tests & Measures              mins   65276  Sammarinese stim                    mins   86432    Un-Timed:  Canalith Repos                mins  92924  Electrical Stimulation:          mins  94090 ( );  Dry Needling          mins 62470/73764  Traction          mins 49659  Low Eval          Mins  82426  Mod Eval          Mins  43721  High Eval                            Mins  04909  Re-Eval                               mins  24883    Timed Treatment:   39   mins   Total Treatment:     39   mins    Ciarra Serrano, PT, DPT, cert. DN  Physical Therapist  [place] Lic # [id number]

## 2024-08-12 ENCOUNTER — OFFICE VISIT (OUTPATIENT)
Dept: ORTHOPEDIC SURGERY | Facility: CLINIC | Age: 44
End: 2024-08-12
Payer: MEDICAID

## 2024-08-12 VITALS — WEIGHT: 197 LBS | HEART RATE: 92 BPM | HEIGHT: 64 IN | BODY MASS INDEX: 33.63 KG/M2 | OXYGEN SATURATION: 98 %

## 2024-08-12 DIAGNOSIS — Z96.651 S/P TKR (TOTAL KNEE REPLACEMENT), RIGHT: Primary | ICD-10-CM

## 2024-08-12 PROCEDURE — 99024 POSTOP FOLLOW-UP VISIT: CPT | Performed by: INTERNAL MEDICINE
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
## 2024-08-23 ENCOUNTER — OFFICE VISIT (OUTPATIENT)
Dept: PAIN MEDICINE | Facility: CLINIC | Age: 44
End: 2024-08-23
Payer: MEDICAID

## 2024-08-23 VITALS
RESPIRATION RATE: 16 BRPM | OXYGEN SATURATION: 97 % | HEART RATE: 92 BPM | SYSTOLIC BLOOD PRESSURE: 125 MMHG | DIASTOLIC BLOOD PRESSURE: 83 MMHG

## 2024-08-23 DIAGNOSIS — G90.521 COMPLEX REGIONAL PAIN SYNDROME TYPE 1 OF RIGHT LOWER EXTREMITY: ICD-10-CM

## 2024-08-23 DIAGNOSIS — G89.18 PAIN AT SURGICAL SITE: ICD-10-CM

## 2024-08-23 PROCEDURE — G0463 HOSPITAL OUTPT CLINIC VISIT: HCPCS | Performed by: STUDENT IN AN ORGANIZED HEALTH CARE EDUCATION/TRAINING PROGRAM

## 2024-08-23 RX ORDER — OXYCODONE AND ACETAMINOPHEN 7.5; 325 MG/1; MG/1
1 TABLET ORAL EVERY 8 HOURS PRN
Qty: 90 TABLET | Refills: 0 | Status: SHIPPED | OUTPATIENT
Start: 2024-10-02

## 2024-08-23 RX ORDER — OXYCODONE AND ACETAMINOPHEN 7.5; 325 MG/1; MG/1
1 TABLET ORAL EVERY 8 HOURS PRN
Qty: 90 TABLET | Refills: 0 | Status: SHIPPED | OUTPATIENT
Start: 2024-11-01

## 2024-08-23 RX ORDER — GABAPENTIN 800 MG/1
800 TABLET ORAL 3 TIMES DAILY
Qty: 90 TABLET | Refills: 2 | Status: SHIPPED | OUTPATIENT
Start: 2024-08-23

## 2024-08-23 RX ORDER — OXYCODONE AND ACETAMINOPHEN 7.5; 325 MG/1; MG/1
1 TABLET ORAL EVERY 8 HOURS PRN
Qty: 90 TABLET | Refills: 0 | Status: SHIPPED | OUTPATIENT
Start: 2024-09-03

## 2024-08-23 NOTE — PROGRESS NOTES
CHIEF COMPLAINT  Chief Complaint   Patient presents with    Pain     LD Oxycodone @ PM 8/22/24       Primary Care  Jhonathan Alejo APRN Subjective   Vidya Becerra is a 44 y.o. female  who presents for right foot and right ankle pain.  She states that she has had multiple surgeries on the right foot and right ankle and has developed what appears to be CRPS of the right lower extremity.  She describes pain especially below the knee into the right foot.  She also describes occasional swelling as well as decreased hair growth and components of allodynia in the right foot and right ankle.  She also has some occasional numbness and tingling as well as decreased strength and decreased capillary refill in the right lower extremity.  She has been tried on several medications in the past and has not gotten any significant benefit.  She is also been evaluated by podiatry without any surgical interventions at this time.    Back Pain  Associated symptoms include numbness and weakness.   Knee Pain   Associated symptoms include numbness.   Pain  Associated symptoms include arthralgias, myalgias, numbness and weakness. Pertinent negatives include no joint swelling.   Foot Pain  Associated symptoms include arthralgias, myalgias, numbness and weakness. Pertinent negatives include no joint swelling.        Location: Right ankle and right foot  Onset: Years ago  Duration: Slowly worsening  Timing: Constant throughout the day  Quality: Sharp, stabbing, burning  Severity: Today: 5       Last Week: 8       Worst: 10  Modifying Factors: The pain is worse with movement and physical activity walking and slightly improved with rest  Functional Deficit: The pain makes it difficult for her to work and perform her activities of daily living    Physical Therapy: yes    Interval Update 08/23/2024: She recent had knee surgery.  Feels like she needs more narcotics now.  She reports that her knee feels very hot and swollen in the evenings and is  requesting more oxycodone for this.  She reports that her orthopedic surgeon that she is essentially pain-free.      The following portions of the patient's history were reviewed and updated as appropriate: allergies, current medications, past family history, past medical history, past social history, past surgical history and problem list.    Procedures:  3/27/2023: Right-sided lumbar synthetic nerve block: 100% relief for 24 hours        Current Outpatient Medications:     albuterol sulfate  (90 Base) MCG/ACT inhaler, Inhale 1-2 puffs As Needed. With bronchitis   use preop if needed bring with, Disp: , Rfl:     amLODIPine (NORVASC) 5 MG tablet, Take 1 tablet by mouth Daily., Disp: , Rfl:     aspirin 81 MG EC tablet, Take 1 tablet by mouth 2 (Two) Times a Day., Disp: 60 tablet, Rfl: 0    atorvastatin (LIPITOR) 40 MG tablet, TAKE 1 TABLET BY MOUTH EVERY DAY AT NIGHT, Disp: 90 tablet, Rfl: 1    baclofen (LIORESAL) 10 MG tablet, Take 1 tablet by mouth At Night As Needed., Disp: , Rfl:     famotidine (PEPCID) 40 MG tablet, Take 1 tablet by mouth Daily., Disp: , Rfl:     gabapentin (NEURONTIN) 800 MG tablet, Take 1 tablet by mouth 3 (Three) Times a Day. Take preop, Disp: 90 tablet, Rfl: 2    lisinopril-hydrochlorothiazide (PRINZIDE,ZESTORETIC) 20-25 MG per tablet, Take 1 tablet by mouth Daily., Disp: , Rfl:     meloxicam (MOBIC) 15 MG tablet, Take 1 tablet by mouth Daily., Disp: , Rfl:     ofloxacin (OCUFLOX) 0.3 % ophthalmic solution, INSTILL 1 DROP INTO AFFECTED EYE EVERY TWO HOURS WHILE AWAKE, Disp: , Rfl:     ondansetron (Zofran) 4 MG tablet, Take 1 tablet by mouth Every 8 (Eight) Hours As Needed for Nausea or Vomiting., Disp: 15 tablet, Rfl: 0    [START ON 10/2/2024] oxyCODONE-acetaminophen (PERCOCET) 7.5-325 MG per tablet, Take 1 tablet by mouth Every 8 (Eight) Hours As Needed for Severe Pain., Disp: 90 tablet, Rfl: 0    pantoprazole (PROTONIX) 40 MG EC tablet, Take 1 tablet by mouth Every Night., Disp: ,  Rfl:     sennosides-docusate (senna-docusate sodium) 8.6-50 MG per tablet, Take 1 tablet by mouth Daily., Disp: 30 tablet, Rfl: 0    [START ON 11/1/2024] oxyCODONE-acetaminophen (PERCOCET) 7.5-325 MG per tablet, Take 1 tablet by mouth Every 8 (Eight) Hours As Needed for Severe Pain., Disp: 90 tablet, Rfl: 0    [START ON 9/3/2024] oxyCODONE-acetaminophen (PERCOCET) 7.5-325 MG per tablet, Take 1 tablet by mouth Every 8 (Eight) Hours As Needed for Severe Pain., Disp: 90 tablet, Rfl: 0    Review of Systems   Musculoskeletal:  Positive for arthralgias, back pain, gait problem and myalgias. Negative for joint swelling.   Neurological:  Positive for weakness and numbness.       Vitals:    08/23/24 1332   BP: 125/83   Pulse: 92   Resp: 16   SpO2: 97%   PainSc:   8       Urine Drug Screen: 4/27/2023  Appropriate: Yes    Objective   Physical Exam  Vitals and nursing note reviewed. Exam conducted with a chaperone present.   Constitutional:       General: She is not in acute distress.     Appearance: Normal appearance. She is normal weight.   Musculoskeletal:      Comments: Right foot is tender to palpation especially below the knee.  She also has decreased capillary refill in the right lower extremity compared to the left.  She shows decreased hair growth as well as some component of swelling compared to the left foot.  She is also losing some muscle mass and muscle strength in the right foot       Neurological:      Mental Status: She is alert.      Sensory: Sensory deficit present.      Motor: Weakness present.           Assessment & Plan   Problems Addressed this Visit    None  Visit Diagnoses       Complex regional pain syndrome type 1 of right lower extremity        Relevant Medications    oxyCODONE-acetaminophen (PERCOCET) 7.5-325 MG per tablet (Start on 11/1/2024)    oxyCODONE-acetaminophen (PERCOCET) 7.5-325 MG per tablet (Start on 9/3/2024)    oxyCODONE-acetaminophen (PERCOCET) 7.5-325 MG per tablet (Start on  10/2/2024)    Pain at surgical site        Relevant Medications    oxyCODONE-acetaminophen (PERCOCET) 7.5-325 MG per tablet (Start on 10/2/2024)          Diagnoses         Codes Comments    Complex regional pain syndrome type 1 of right lower extremity     ICD-10-CM: G90.521  ICD-9-CM: 337.22     Pain at surgical site     ICD-10-CM: G89.18  ICD-9-CM: 338.18             Plan:  Continue Percocet 7.5 mg 3 times daily.  Given that she has had surgery and appears to be recovering well, really no role to continue increasing her narcotics  Refill gabapentin  UDS and inspect appropriate  --- Follow-up 3 months           INSPECT REPORT    As part of the patient's treatment plan, I may be prescribing controlled substances. The patient has been made aware of appropriate use of such medications, including potential risk of somnolence, limited ability to drive and/or work safely, and the potential for dependence or overdose. It has also bee made clear that these medications are for use by this patient only, without concomitant use of alcohol or other substances unless prescribed.     Patient has completed prescribing agreement detailing terms of continued prescribing of controlled substances, including monitoring CRISSY reports, urine drug screening, and pill counts if necessary. The patient is aware that inappropriate use will results in cessation of prescribing such medications.    INSPECT report has been reviewed and scanned into the patient's chart.    As the clinician, I personally reviewed the INSPECT from 8/21/2024.    History and physical exam exhibit continued safe and appropriate use of controlled substances.      EMR Dragon/Transcription disclaimer:   Much of this encounter note is an electronic transcription/translation of spoken language to printed text. The electronic translation of spoken language may permit erroneous, or at times, nonsensical words or phrases to be inadvertently transcribed; Although I have  reviewed the note for such errors, some may still exist.

## 2024-08-28 ENCOUNTER — TELEPHONE (OUTPATIENT)
Dept: ORTHOPEDIC SURGERY | Facility: CLINIC | Age: 44
End: 2024-08-28
Payer: MEDICAID

## 2024-08-28 NOTE — TELEPHONE ENCOUNTER
Patient states knee has been warmer than the rest of her body and in extreme pain since July. Denies redness, fever or chills. She asked  if she should proceed to ED and he told her to ask you due to no redness, fever or chills.  Call back # [phone]

## 2024-08-29 ENCOUNTER — OFFICE VISIT (OUTPATIENT)
Dept: ORTHOPEDIC SURGERY | Facility: CLINIC | Age: 44
End: 2024-08-29
Payer: MEDICAID

## 2024-08-29 VITALS — BODY MASS INDEX: 33.63 KG/M2 | HEIGHT: 64 IN | RESPIRATION RATE: 20 BRPM | WEIGHT: 197 LBS

## 2024-08-29 DIAGNOSIS — Z96.651 S/P TKR (TOTAL KNEE REPLACEMENT), RIGHT: Primary | ICD-10-CM

## 2024-08-29 PROCEDURE — 99024 POSTOP FOLLOW-UP VISIT: CPT | Performed by: INTERNAL MEDICINE
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
## 2024-09-12 ENCOUNTER — OFFICE VISIT (OUTPATIENT)
Dept: PODIATRY | Facility: CLINIC | Age: 44
End: 2024-09-12
Payer: MEDICAID

## 2024-09-12 VITALS — WEIGHT: 197 LBS | RESPIRATION RATE: 20 BRPM | HEIGHT: 64 IN | BODY MASS INDEX: 33.63 KG/M2

## 2024-09-12 DIAGNOSIS — M79.671 RIGHT FOOT PAIN: Primary | ICD-10-CM

## 2024-09-12 DIAGNOSIS — M76.71 PERONEAL TENDINITIS OF RIGHT LOWER EXTREMITY: ICD-10-CM

## 2024-09-12 DIAGNOSIS — M19.071 ARTHRITIS OF RIGHT FOOT: ICD-10-CM

## 2024-09-12 DIAGNOSIS — G57.81 SURAL NEUROPATHY, RIGHT: ICD-10-CM

## 2024-09-13 NOTE — PROGRESS NOTES
09/12/2024  Foot and Ankle Surgery - Established Patient/Follow-up  Provider: Dr. Domenic Malloy DPM  Location: [facility]    Subjective:  Vidya Becerra is a 44 y.o. female.     Chief Complaint   Patient presents with    Right Ankle - Follow-up, Pain    Right Foot - Follow-up, Fracture     Nondisplaced fx 2nd metatarsal     Follow-up     BRADY Alejo aprn 6/20/2024       History of Present Illness  The patient presents for evaluation of right foot and ankle pain.    She continues to experience discomfort in her right ankle, although the condition of her foot has improved. She describes a sensation akin to a popping feeling each time she steps down. She recalls a previous procedure performed by Dr. Amezquita on her ankle, which resulted in a small scar. However, she does not remember the specifics of this procedure. She also mentions that the relief provided by the last steroid injection was short-lived.    Her knee is doing well following a total knee replacement. She is able to move around better, although with some residual soreness. She is actively working on strengthening her knee through bicycle exercises at home. Despite some pain, she can fully extend her knee. Prior to the surgery, she had issues with knee overextension. Her last MRI was conducted before the knee replacement surgery.      Allergies   Allergen Reactions    Chlorhexidine Gluconate Itching    Medrol [Methylprednisolone] Itching     Dosepak Oral steroids        Current Outpatient Medications on File Prior to Visit   Medication Sig Dispense Refill    albuterol sulfate  (90 Base) MCG/ACT inhaler Inhale 1-2 puffs As Needed. With bronchitis   use preop if needed bring with      amLODIPine (NORVASC) 5 MG tablet Take 1 tablet by mouth Daily.      aspirin 81 MG EC tablet Take 1 tablet by mouth 2 (Two) Times a Day. 60 tablet 0    atorvastatin (LIPITOR) 40 MG tablet TAKE 1 TABLET BY MOUTH EVERY DAY AT NIGHT 90 tablet 1    baclofen (LIORESAL)  "10 MG tablet Take 1 tablet by mouth At Night As Needed.      famotidine (PEPCID) 40 MG tablet Take 1 tablet by mouth Daily.      gabapentin (NEURONTIN) 800 MG tablet Take 1 tablet by mouth 3 (Three) Times a Day. Take preop 90 tablet 2    Ibuprofen 3 %, Gabapentin 10 %, Baclofen 2 %, lidocaine 4 %, Ketamine HCl 4 % Apply 1-2 g topically to the appropriate area as directed 3 (Three) to 4 (Four) times daily. 90 g 3    lisinopril-hydrochlorothiazide (PRINZIDE,ZESTORETIC) 20-25 MG per tablet Take 1 tablet by mouth Daily.      meloxicam (MOBIC) 15 MG tablet Take 1 tablet by mouth Daily.      ofloxacin (OCUFLOX) 0.3 % ophthalmic solution INSTILL 1 DROP INTO AFFECTED EYE EVERY TWO HOURS WHILE AWAKE      ondansetron (Zofran) 4 MG tablet Take 1 tablet by mouth Every 8 (Eight) Hours As Needed for Nausea or Vomiting. 15 tablet 0    [START ON 11/1/2024] oxyCODONE-acetaminophen (PERCOCET) 7.5-325 MG per tablet Take 1 tablet by mouth Every 8 (Eight) Hours As Needed for Severe Pain. 90 tablet 0    oxyCODONE-acetaminophen (PERCOCET) 7.5-325 MG per tablet Take 1 tablet by mouth Every 8 (Eight) Hours As Needed for Severe Pain. 90 tablet 0    [START ON 10/2/2024] oxyCODONE-acetaminophen (PERCOCET) 7.5-325 MG per tablet Take 1 tablet by mouth Every 8 (Eight) Hours As Needed for Severe Pain. 90 tablet 0    pantoprazole (PROTONIX) 40 MG EC tablet Take 1 tablet by mouth Every Night.      sennosides-docusate (senna-docusate sodium) 8.6-50 MG per tablet Take 1 tablet by mouth Daily. 30 tablet 0     No current facility-administered medications on file prior to visit.       Objective   Resp 20   Ht 162.6 cm (64\")   Wt 89.4 kg (197 lb)   BMI 33.81 kg/m²     Foot/Ankle Exam  Physical Exam  GENERAL  Orientation:  AAOx3  Affect:  appropriate     VASCULAR      Right Foot Vascularity   Normal vascular exam    Dorsalis pedis:  2+  Posterior tibial:  2+  Skin temperature:  warm  Edema grading:  None  CFT:  < 3 seconds  Pedal hair growth:  " Present  Varicosities:  none      Left Foot Vascularity   Normal vascular exam    Dorsalis pedis:  2+  Posterior tibial:  2+  Skin temperature:  warm  Edema grading:  None  CFT:  < 3 seconds  Pedal hair growth:  Present  Varicosities:  none     NEUROLOGIC      Right Foot Neurologic   Light touch sensation: normal  Hot/Cold sensation: normal  Achilles reflex:  2+      Left Foot Neurologic   Light touch sensation: normal  Hot/Cold sensation:  normal  Achilles reflex:  2+     MUSCULOSKELETAL      Right Foot Musculoskeletal   Arch:  Normal      Left Foot Musculoskeletal   Arch:  Normal     MUSCLE STRENGTH      Right Foot Muscle Strength   Normal strength    Foot dorsiflexion:  5  Foot plantar flexion:  5  Foot inversion:  5  Foot eversion:  5      Left Foot Muscle Strength   Normal strength    Foot dorsiflexion:  5  Foot plantar flexion:  5  Foot inversion:  5  Foot eversion:  5     DERMATOLOGIC       Right Foot Dermatologic   Skin  Right foot skin is intact.       Left Foot Dermatologic   Skin  Left foot skin is intact.      TESTS      Right Foot Tests   Anterior drawer: negative  Varus tilt: negative      Left Foot Tests   Anterior drawer: negative  Varus tilt: negative     Right foot additional comments: Moderate discomfort with palpation to the midfoot. Stable scar formations involving the dorsal aspect of the foot and lateral aspect of the rear foot. No open wounds, signs of inflammation or infection. Mild deformity with decreased medial arch. Limited range of motion to the midfoot. Knee brace in place to the right lower extremity.     04/11/2023  No progressive deformity or instability.     05/15/2023   Incision sites are dry and stable with intact sutures. No evidence of dehiscence or infection. Rectus foot alignment. Mild swelling to the midfoot as expected.     06/13/2023: Continued improvement with decreased edema and erythema. Incision sites are well healed at this time. No progressive deformity or  instability.     07/17/2023  Continued improvement. Mild swelling involving the mid foot as expected. No pain with palpation. No progressive deformity or instability.     09/14/2023: Moderate swelling involving the midfoot but no significant pain with palpation. No progressive deformity or instability.     11/14/2023: Continued discomfort involving the lateral aspect of the right foot. No significant pain or swelling involving the forefoot and midfoot region. No progressive deformity or instability.     04/18/2024: Discomfort is felt upon palpation on the lateral aspect of the foot and ankle. No progressive deformity or instability. No swelling or limitation.    9/12/24: Continued discomfort involving the lateral aspect of the right foot and ankle.  Peroneal tendons appear attenuated with continued deficit to the skin and subcutaneous tissues.  Allodynia present with light touch.  Range of motion muscle function is appropriate.  Improved range of motion involving the right knee.  Right midfoot remained stable.      Results      Assessment & Plan   Diagnoses and all orders for this visit:    1. Right foot pain (Primary)    2. Peroneal tendinitis of right lower extremity  -     MRI Ankle Right Without Contrast; Future    3. Sural neuropathy, right    4. Arthritis of right foot      Assessment & Plan    The patient reports persistent pain in the right ankle, describing a sensation similar to a pop with each step. Previous imaging has not provided definitive answers. Differential diagnoses include an entrapped nerve or tendon deficiency. A steroid injection was administered today to alleviate the pain. The potential benefits and risks of PRP injections were discussed, but she declined this option due to insurance coverage issues. Long-term use of steroid injections was discouraged due to the risk of skin thinning and other complications.  I have suggested that we proceed with an MRI of the right foot for further  evaluation.  Patient has been instructed to wear the lace up ankle brace on a daily basis.  I would like her to follow-up with me in 2 weeks for MRI result discussion and further planning.  Greater than 20 minutes spent before, during, and after evaluation for patient care.                 Patient or patient representative verbalized consent for the use of Ambient Listening during the visit with  JESSICA Malloy DPM for chart documentation. 9/13/2024  10:05 EDT    JESSICA Malloy DPM

## 2024-09-19 ENCOUNTER — TELEPHONE (OUTPATIENT)
Dept: PODIATRY | Facility: CLINIC | Age: 44
End: 2024-09-19
Payer: MEDICAID

## 2024-10-04 ENCOUNTER — HOSPITAL ENCOUNTER (OUTPATIENT)
Dept: MRI IMAGING | Facility: HOSPITAL | Age: 44
Discharge: HOME | End: 2024-10-04
Payer: MEDICAID

## 2024-10-04 DIAGNOSIS — M76.71 PERONEAL TENDINITIS OF RIGHT LOWER EXTREMITY: ICD-10-CM

## 2024-10-04 PROCEDURE — 73721 MRI JNT OF LWR EXTRE W/O DYE: CPT

## 2024-10-15 ENCOUNTER — OFFICE VISIT (OUTPATIENT)
Dept: PODIATRY | Facility: CLINIC | Age: 44
End: 2024-10-15
Payer: MEDICAID

## 2024-10-15 VITALS — RESPIRATION RATE: 20 BRPM | WEIGHT: 197 LBS | HEIGHT: 64 IN | BODY MASS INDEX: 33.63 KG/M2

## 2024-10-15 DIAGNOSIS — M25.371 ANKLE INSTABILITY, RIGHT: ICD-10-CM

## 2024-10-15 DIAGNOSIS — G57.71 COMPLEX REGIONAL PAIN SYNDROME TYPE 2 OF RIGHT LOWER EXTREMITY: ICD-10-CM

## 2024-10-15 DIAGNOSIS — M79.671 RIGHT FOOT PAIN: Primary | ICD-10-CM

## 2024-10-15 DIAGNOSIS — M76.71 PERONEAL TENDINITIS OF RIGHT LOWER EXTREMITY: ICD-10-CM

## 2024-10-15 DIAGNOSIS — G57.81 SURAL NEUROPATHY, RIGHT: ICD-10-CM

## 2024-10-16 NOTE — PROGRESS NOTES
10/15/2024  Foot and Ankle Surgery - Established Patient/Follow-up  Provider: Dr. Domenic Malloy DPM  Location: [facility]    Subjective:  Vidya Becerra is a 44 y.o. female.     Chief Complaint   Patient presents with    Right Ankle - Follow-up, Pain     MRI RESULTS TODAY    Follow-up     BRADY Alejo aprn 6/20/2024       History of Present Illness  The patient presents for evaluation of foot pain.    She reports that her foot condition remains unchanged. She has undergone an MRI scan and previously consulted with Dr. Noyola in [place], who performed a surgical procedure involving the removal of a small piece of bone for a graft. However, this fusion was unsuccessful.    Subsequently, she sought treatment from Dr. Amezquita, who administered two steroid injections. She believes she received one or two additional injections at this clinic. She expresses dissatisfaction with Dr. Amezquita's surgical approach, stating that the screw was not properly placed and eventually dislodged. She is frustrated as she feels her concerns were not adequately addressed.    Her primary goal is to regain mobility without experiencing pain. She acknowledges the likelihood of developing arthritis and experiencing dull rotation. She has tried using a brace but did not observe any improvement in stability. She also mentions that her insurance does not cover PRP injections.      Allergies   Allergen Reactions    Chlorhexidine Gluconate Itching    Medrol [Methylprednisolone] Itching     Dosepak Oral steroids        Current Outpatient Medications on File Prior to Visit   Medication Sig Dispense Refill    albuterol sulfate  (90 Base) MCG/ACT inhaler Inhale 1-2 puffs As Needed. With bronchitis   use preop if needed bring with      amLODIPine (NORVASC) 5 MG tablet Take 1 tablet by mouth Daily.      aspirin 81 MG EC tablet Take 1 tablet by mouth 2 (Two) Times a Day. 60 tablet 0    atorvastatin (LIPITOR) 40 MG tablet TAKE 1 TABLET BY  "MOUTH EVERY DAY AT NIGHT 90 tablet 1    baclofen (LIORESAL) 10 MG tablet Take 1 tablet by mouth At Night As Needed.      famotidine (PEPCID) 40 MG tablet Take 1 tablet by mouth Daily.      gabapentin (NEURONTIN) 800 MG tablet Take 1 tablet by mouth 3 (Three) Times a Day. Take preop 90 tablet 2    Ibuprofen 3 %, Gabapentin 10 %, Baclofen 2 %, lidocaine 4 %, Ketamine HCl 4 % Apply 1-2 g topically to the appropriate area as directed 3 (Three) to 4 (Four) times daily. 90 g 3    meloxicam (MOBIC) 15 MG tablet Take 1 tablet by mouth Daily.      ofloxacin (OCUFLOX) 0.3 % ophthalmic solution INSTILL 1 DROP INTO AFFECTED EYE EVERY TWO HOURS WHILE AWAKE      ondansetron (Zofran) 4 MG tablet Take 1 tablet by mouth Every 8 (Eight) Hours As Needed for Nausea or Vomiting. 15 tablet 0    [START ON 11/1/2024] oxyCODONE-acetaminophen (PERCOCET) 7.5-325 MG per tablet Take 1 tablet by mouth Every 8 (Eight) Hours As Needed for Severe Pain. 90 tablet 0    oxyCODONE-acetaminophen (PERCOCET) 7.5-325 MG per tablet Take 1 tablet by mouth Every 8 (Eight) Hours As Needed for Severe Pain. 90 tablet 0    oxyCODONE-acetaminophen (PERCOCET) 7.5-325 MG per tablet Take 1 tablet by mouth Every 8 (Eight) Hours As Needed for Severe Pain. 90 tablet 0    pantoprazole (PROTONIX) 40 MG EC tablet Take 1 tablet by mouth Every Night.      sennosides-docusate (senna-docusate sodium) 8.6-50 MG per tablet Take 1 tablet by mouth Daily. 30 tablet 0    lisinopril-hydrochlorothiazide (PRINZIDE,ZESTORETIC) 20-25 MG per tablet Take 1 tablet by mouth Daily. (Patient not taking: Reported on 10/15/2024)       No current facility-administered medications on file prior to visit.       Objective   Resp 20   Ht 162.6 cm (64\")   Wt 89.4 kg (197 lb)   BMI 33.81 kg/m²     Foot/Ankle Exam  Physical Exam  GENERAL  Orientation:  AAOx3  Affect:  appropriate     VASCULAR      Right Foot Vascularity   Normal vascular exam    Dorsalis pedis:  2+  Posterior tibial:  2+  Skin " temperature:  warm  Edema grading:  None  CFT:  < 3 seconds  Pedal hair growth:  Present  Varicosities:  none      Left Foot Vascularity   Normal vascular exam    Dorsalis pedis:  2+  Posterior tibial:  2+  Skin temperature:  warm  Edema grading:  None  CFT:  < 3 seconds  Pedal hair growth:  Present  Varicosities:  none     NEUROLOGIC      Right Foot Neurologic   Light touch sensation: normal  Hot/Cold sensation: normal  Achilles reflex:  2+      Left Foot Neurologic   Light touch sensation: normal  Hot/Cold sensation:  normal  Achilles reflex:  2+     MUSCULOSKELETAL      Right Foot Musculoskeletal   Arch:  Normal      Left Foot Musculoskeletal   Arch:  Normal     MUSCLE STRENGTH      Right Foot Muscle Strength   Normal strength    Foot dorsiflexion:  5  Foot plantar flexion:  5  Foot inversion:  5  Foot eversion:  5      Left Foot Muscle Strength   Normal strength    Foot dorsiflexion:  5  Foot plantar flexion:  5  Foot inversion:  5  Foot eversion:  5     DERMATOLOGIC       Right Foot Dermatologic   Skin  Right foot skin is intact.       Left Foot Dermatologic   Skin  Left foot skin is intact.      TESTS      Right Foot Tests   Anterior drawer: negative  Varus tilt: negative      Left Foot Tests   Anterior drawer: negative  Varus tilt: negative     Right foot additional comments: Moderate discomfort with palpation to the midfoot. Stable scar formations involving the dorsal aspect of the foot and lateral aspect of the rear foot. No open wounds, signs of inflammation or infection. Mild deformity with decreased medial arch. Limited range of motion to the midfoot. Knee brace in place to the right lower extremity.     04/11/2023  No progressive deformity or instability.     05/15/2023   Incision sites are dry and stable with intact sutures. No evidence of dehiscence or infection. Rectus foot alignment. Mild swelling to the midfoot as expected.     06/13/2023: Continued improvement with decreased edema and erythema.  Incision sites are well healed at this time. No progressive deformity or instability.     07/17/2023  Continued improvement. Mild swelling involving the mid foot as expected. No pain with palpation. No progressive deformity or instability.     09/14/2023: Moderate swelling involving the midfoot but no significant pain with palpation. No progressive deformity or instability.     11/14/2023: Continued discomfort involving the lateral aspect of the right foot. No significant pain or swelling involving the forefoot and midfoot region. No progressive deformity or instability.     04/18/2024: Discomfort is felt upon palpation on the lateral aspect of the foot and ankle. No progressive deformity or instability. No swelling or limitation.     9/12/24: Continued discomfort involving the lateral aspect of the right foot and ankle.  Peroneal tendons appear attenuated with continued deficit to the skin and subcutaneous tissues.  Allodynia present with light touch.  Range of motion muscle function is appropriate.  Improved range of motion involving the right knee.  Right midfoot remained stable.    10/15/24: Continued discomfort involving the lateral aspect of the foot and ankle.  Subcutaneous tissue deficit to the lateral aspect of the foot is unchanged.  Allodynia remains present along the sural nerve distribution.  Instability present with talar tilt and anterior drawer testing.  No progressive deformity present.  Midfoot remained stable.      Results  Imaging  MRI of the foot shows mild thickening of the peroneal tendons which may reflect tendinopathy. No evidence of significant tenosynovitis or tear. Mild posterior tibial tendinopathy. Small ganglion cyst extending from the talonavicular joints towards the sinus tarsi. Chronic tearing of the anterior talofibular and calcaneofibular ligaments.    Assessment & Plan   Diagnoses and all orders for this visit:    1. Right foot pain (Primary)    2. Peroneal tendinitis of right  lower extremity  -     Case Request; Standing  -     CBC (No Diff); Future  -     Basic Metabolic Panel; Future  -     XR Chest 2 View; Future  -     ECG 12 Lead; Future  -     ceFAZolin (ANCEF) 2,000 mg in sodium chloride 0.9 % 100 mL IVPB  -     Case Request    3. Sural neuropathy, right  -     Case Request; Standing  -     CBC (No Diff); Future  -     Basic Metabolic Panel; Future  -     XR Chest 2 View; Future  -     ECG 12 Lead; Future  -     ceFAZolin (ANCEF) 2,000 mg in sodium chloride 0.9 % 100 mL IVPB  -     Case Request    4. Ankle instability, right  -     Case Request; Standing  -     CBC (No Diff); Future  -     Basic Metabolic Panel; Future  -     XR Chest 2 View; Future  -     ECG 12 Lead; Future  -     ceFAZolin (ANCEF) 2,000 mg in sodium chloride 0.9 % 100 mL IVPB  -     Case Request    5. Complex regional pain syndrome type 2 of right lower extremity    Other orders  -     Follow Anesthesia Guidelines / Protocol; Future  -     Follow Anesthesia Guidelines / Protocol; Standing  -     Verify / Perform Chlorhexidine Skin Prep; Standing  -     Provide NPO Instructions to Patient; Future  -     Chlorhexidine Skin Prep; Future  -     Place Sequential Compression Device; Standing  -     Maintain Sequential Compression Device; Standing      Assessment & Plan    Patient returns for MRI result discussion and further planning.  MRI was independently reviewed and discussed with patient.  Findings are consistent with mild tendinopathy involving the peroneal tendons with evidence of chronic tearing involving the ATFL and CFL.  Clinically, she continues to have pain involving the lateral aspect of the foot and ankle.  I have explained that this could be secondary to instability and peroneal tendinopathy, but it could also be secondary to sural neuropathy.  We reviewed further options.  She does not feel that conservative care is offering any significant improvements.  She has undergone previous steroid injections  with minimal response.  Given the symptoms and MRI findings, I do feel that the most appropriate option would be to proceed with ankle arthroscopy with anterior talofibular and calcaneofibular ligament repair as needed.  We would also proceed with evaluation of the peroneal tendons and sural nerve with repair as needed.  I reviewed the procedures, risks, goals, and recovery at length.  She understands that she would require off weightbearing and decreased overall activity after surgery.  We did discuss risks of continued pain and limitation despite surgery secondary to potential nerve pathology.  Patient understands and agrees and would like to proceed with surgical planning.  I have asked that she call with any additional issues or concerns.  Greater than 30 minutes was spent before, during, and after evaluation for patient care.               Patient or patient representative verbalized consent for the use of Ambient Listening during the visit with  JESSICA Malloy DPM for chart documentation. 10/16/2024  07:40 EDT    JESSICA Malloy DPM

## 2024-10-18 PROBLEM — M76.71 PERONEAL TENDINITIS OF RIGHT LOWER EXTREMITY: Status: ACTIVE | Noted: 2024-10-15

## 2024-10-18 PROBLEM — G57.81: Status: ACTIVE | Noted: 2024-10-15

## 2024-10-18 PROBLEM — M25.371 ANKLE INSTABILITY, RIGHT: Status: ACTIVE | Noted: 2024-10-15

## 2024-10-25 ENCOUNTER — LAB (OUTPATIENT)
Dept: LAB | Facility: HOSPITAL | Age: 44
End: 2024-10-25
Payer: MEDICAID

## 2024-10-25 ENCOUNTER — HOSPITAL ENCOUNTER (OUTPATIENT)
Dept: CARDIOLOGY | Facility: HOSPITAL | Age: 44
Discharge: HOME | End: 2024-10-25
Payer: MEDICAID

## 2024-10-25 ENCOUNTER — HOSPITAL ENCOUNTER (OUTPATIENT)
Dept: GENERAL RADIOLOGY | Facility: HOSPITAL | Age: 44
Discharge: HOME | End: 2024-10-25
Payer: MEDICAID

## 2024-10-25 DIAGNOSIS — M76.71 PERONEAL TENDINITIS OF RIGHT LOWER EXTREMITY: ICD-10-CM

## 2024-10-25 DIAGNOSIS — M25.371 ANKLE INSTABILITY, RIGHT: ICD-10-CM

## 2024-10-25 DIAGNOSIS — G57.81 SURAL NEUROPATHY, RIGHT: ICD-10-CM

## 2024-10-25 LAB
ANION GAP SERPL CALCULATED.3IONS-SCNC: 9 MMOL/L (ref 5–15)
BUN SERPL-MCNC: 5 MG/DL (ref 6–20)
BUN/CREAT SERPL: 7.1 (ref 7–25)
CALCIUM SPEC-SCNC: 9.1 MG/DL (ref 8.6–10.5)
CHLORIDE SERPL-SCNC: 106 MMOL/L (ref 98–107)
CO2 SERPL-SCNC: 25 MMOL/L (ref 22–29)
CREAT SERPL-MCNC: 0.7 MG/DL (ref 0.57–1)
DEPRECATED RDW RBC AUTO: 43.1 FL (ref 37–54)
EGFRCR SERPLBLD CKD-EPI 2021: 110.2 ML/MIN/1.73
ERYTHROCYTE [DISTWIDTH] IN BLOOD BY AUTOMATED COUNT: 13.2 % (ref 12.3–15.4)
GLUCOSE SERPL-MCNC: 87 MG/DL (ref 65–99)
HCT VFR BLD AUTO: 45.9 % (ref 34–46.6)
HGB BLD-MCNC: 14.7 G/DL (ref 12–15.9)
MCH RBC QN AUTO: 28.2 PG (ref 26.6–33)
MCHC RBC AUTO-ENTMCNC: 32 G/DL (ref 31.5–35.7)
MCV RBC AUTO: 87.9 FL (ref 79–97)
PLATELET # BLD AUTO: 337 10*3/MM3 (ref 140–450)
PMV BLD AUTO: 10.7 FL (ref 6–12)
POTASSIUM SERPL-SCNC: 4.3 MMOL/L (ref 3.5–5.2)
RBC # BLD AUTO: 5.22 10*6/MM3 (ref 3.77–5.28)
SODIUM SERPL-SCNC: 140 MMOL/L (ref 136–145)
WBC NRBC COR # BLD AUTO: 14.35 10*3/MM3 (ref 3.4–10.8)

## 2024-10-25 PROCEDURE — 93005 ELECTROCARDIOGRAM TRACING: CPT | Performed by: PODIATRIST

## 2024-10-25 PROCEDURE — 80048 BASIC METABOLIC PNL TOTAL CA: CPT

## 2024-10-25 PROCEDURE — 85027 COMPLETE CBC AUTOMATED: CPT

## 2024-10-25 PROCEDURE — 36415 COLL VENOUS BLD VENIPUNCTURE: CPT

## 2024-10-25 PROCEDURE — 71046 X-RAY EXAM CHEST 2 VIEWS: CPT

## 2024-10-27 LAB
QT INTERVAL: 377 MS
QTC INTERVAL: 420 MS

## 2024-11-04 ENCOUNTER — OFFICE VISIT (OUTPATIENT)
Dept: ORTHOPEDIC SURGERY | Facility: CLINIC | Age: 44
End: 2024-11-04
Payer: MEDICAID

## 2024-11-04 VITALS — HEIGHT: 64 IN | BODY MASS INDEX: 33.97 KG/M2 | OXYGEN SATURATION: 99 % | HEART RATE: 83 BPM | WEIGHT: 199 LBS

## 2024-11-04 DIAGNOSIS — Z96.651 S/P TKR (TOTAL KNEE REPLACEMENT), RIGHT: Primary | ICD-10-CM

## 2024-11-04 PROCEDURE — 99213 OFFICE O/P EST LOW 20 MIN: CPT | Performed by: INTERNAL MEDICINE
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
## 2024-11-22 ENCOUNTER — OFFICE VISIT (OUTPATIENT)
Dept: PAIN MEDICINE | Facility: CLINIC | Age: 44
End: 2024-11-22
Payer: MEDICAID

## 2024-11-22 VITALS
OXYGEN SATURATION: 96 % | DIASTOLIC BLOOD PRESSURE: 93 MMHG | HEART RATE: 98 BPM | SYSTOLIC BLOOD PRESSURE: 144 MMHG | RESPIRATION RATE: 16 BRPM

## 2024-11-22 DIAGNOSIS — G90.521 COMPLEX REGIONAL PAIN SYNDROME TYPE 1 OF RIGHT LOWER EXTREMITY: ICD-10-CM

## 2024-11-22 DIAGNOSIS — Z96.651 S/P TKR (TOTAL KNEE REPLACEMENT), RIGHT: ICD-10-CM

## 2024-11-22 DIAGNOSIS — G89.18 PAIN AT SURGICAL SITE: ICD-10-CM

## 2024-11-22 PROCEDURE — 99214 OFFICE O/P EST MOD 30 MIN: CPT | Performed by: STUDENT IN AN ORGANIZED HEALTH CARE EDUCATION/TRAINING PROGRAM

## 2024-11-22 PROCEDURE — 3077F SYST BP >= 140 MM HG: CPT | Performed by: STUDENT IN AN ORGANIZED HEALTH CARE EDUCATION/TRAINING PROGRAM

## 2024-11-22 PROCEDURE — 1160F RVW MEDS BY RX/DR IN RCRD: CPT | Performed by: STUDENT IN AN ORGANIZED HEALTH CARE EDUCATION/TRAINING PROGRAM

## 2024-11-22 PROCEDURE — 3080F DIAST BP >= 90 MM HG: CPT | Performed by: STUDENT IN AN ORGANIZED HEALTH CARE EDUCATION/TRAINING PROGRAM

## 2024-11-22 PROCEDURE — 1159F MED LIST DOCD IN RCRD: CPT | Performed by: STUDENT IN AN ORGANIZED HEALTH CARE EDUCATION/TRAINING PROGRAM

## 2024-11-22 PROCEDURE — G0463 HOSPITAL OUTPT CLINIC VISIT: HCPCS | Performed by: STUDENT IN AN ORGANIZED HEALTH CARE EDUCATION/TRAINING PROGRAM

## 2024-11-22 PROCEDURE — 1125F AMNT PAIN NOTED PAIN PRSNT: CPT | Performed by: STUDENT IN AN ORGANIZED HEALTH CARE EDUCATION/TRAINING PROGRAM

## 2024-11-22 RX ORDER — GABAPENTIN 800 MG/1
800 TABLET ORAL 3 TIMES DAILY
Qty: 90 TABLET | Refills: 2 | Status: SHIPPED | OUTPATIENT
Start: 2024-11-22

## 2024-11-22 RX ORDER — OXYCODONE AND ACETAMINOPHEN 7.5; 325 MG/1; MG/1
1 TABLET ORAL EVERY 8 HOURS PRN
Qty: 90 TABLET | Refills: 0 | Status: SHIPPED | OUTPATIENT
Start: 2025-01-24

## 2024-11-22 RX ORDER — OXYCODONE AND ACETAMINOPHEN 7.5; 325 MG/1; MG/1
1 TABLET ORAL EVERY 8 HOURS PRN
Qty: 90 TABLET | Refills: 0 | Status: SHIPPED | OUTPATIENT
Start: 2024-12-02

## 2024-11-22 RX ORDER — OXYCODONE AND ACETAMINOPHEN 7.5; 325 MG/1; MG/1
1 TABLET ORAL EVERY 8 HOURS PRN
Qty: 90 TABLET | Refills: 0 | Status: SHIPPED | OUTPATIENT
Start: 2024-12-27

## 2024-11-22 NOTE — PROGRESS NOTES
CHIEF COMPLAINT  Chief Complaint   Patient presents with    Pain     LD Oxycodone @ noon 11/22/24       Primary Care  Jhonathan Alejo APRN Subjective   Vidya Becerra is a 44 y.o. female  who presents for right foot and right ankle pain.  She states that she has had multiple surgeries on the right foot and right ankle and has developed what appears to be CRPS of the right lower extremity.  She describes pain especially below the knee into the right foot.  She also describes occasional swelling as well as decreased hair growth and components of allodynia in the right foot and right ankle.  She also has some occasional numbness and tingling as well as decreased strength and decreased capillary refill in the right lower extremity.  She has been tried on several medications in the past and has not gotten any significant benefit.  She is also been evaluated by podiatry without any surgical interventions at this time.    Back Pain  Associated symptoms include numbness and weakness.   Knee Pain   Associated symptoms include numbness.   Pain  Associated symptoms include arthralgias, myalgias, numbness and weakness. Pertinent negatives include no joint swelling.   Foot Pain  Associated symptoms include arthralgias, myalgias, numbness and weakness. Pertinent negatives include no joint swelling.        Location: Right ankle and right foot  Onset: Years ago  Duration: Slowly worsening  Timing: Constant throughout the day  Quality: Sharp, stabbing, burning  Severity: Today: 5       Last Week: 8       Worst: 10  Modifying Factors: The pain is worse with movement and physical activity walking and slightly improved with rest  Functional Deficit: The pain makes it difficult for her to work and perform her activities of daily living    Physical Therapy: yes    Interval Update 11/22/2024: Continues to have more orthopedic surgeries.  Continue oxycodone to 0.5 mg 3 times daily.  Otherwise, no changes    The following portions of the  patient's history were reviewed and updated as appropriate: allergies, current medications, past family history, past medical history, past social history, past surgical history and problem list.    Procedures:  3/27/2023: Right-sided lumbar synthetic nerve block: 100% relief for 24 hours        Current Outpatient Medications:     albuterol sulfate  (90 Base) MCG/ACT inhaler, Inhale 1-2 puffs As Needed. With bronchitis   use preop if needed bring with, Disp: , Rfl:     amLODIPine (NORVASC) 5 MG tablet, Take 1 tablet by mouth Daily., Disp: , Rfl:     aspirin 81 MG EC tablet, Take 1 tablet by mouth 2 (Two) Times a Day., Disp: 60 tablet, Rfl: 0    atorvastatin (LIPITOR) 40 MG tablet, TAKE 1 TABLET BY MOUTH EVERY DAY AT NIGHT, Disp: 90 tablet, Rfl: 1    baclofen (LIORESAL) 10 MG tablet, Take 1 tablet by mouth At Night As Needed., Disp: , Rfl:     famotidine (PEPCID) 40 MG tablet, Take 1 tablet by mouth Daily., Disp: , Rfl:     gabapentin (NEURONTIN) 800 MG tablet, Take 1 tablet by mouth 3 (Three) Times a Day. Take preop, Disp: 90 tablet, Rfl: 2    Ibuprofen 3 %, Gabapentin 10 %, Baclofen 2 %, lidocaine 4 %, Ketamine HCl 4 %, Apply 1-2 g topically to the appropriate area as directed 3 (Three) to 4 (Four) times daily., Disp: 90 g, Rfl: 3    lisinopril-hydrochlorothiazide (PRINZIDE,ZESTORETIC) 20-25 MG per tablet, Take 1 tablet by mouth Daily., Disp: , Rfl:     meloxicam (MOBIC) 15 MG tablet, Take 1 tablet by mouth Daily., Disp: , Rfl:     ofloxacin (OCUFLOX) 0.3 % ophthalmic solution, INSTILL 1 DROP INTO AFFECTED EYE EVERY TWO HOURS WHILE AWAKE, Disp: , Rfl:     ondansetron (Zofran) 4 MG tablet, Take 1 tablet by mouth Every 8 (Eight) Hours As Needed for Nausea or Vomiting., Disp: 15 tablet, Rfl: 0    [START ON 12/2/2024] oxyCODONE-acetaminophen (PERCOCET) 7.5-325 MG per tablet, Take 1 tablet by mouth Every 8 (Eight) Hours As Needed for Severe Pain., Disp: 90 tablet, Rfl: 0    [START ON 12/27/2024]  oxyCODONE-acetaminophen (PERCOCET) 7.5-325 MG per tablet, Take 1 tablet by mouth Every 8 (Eight) Hours As Needed for Severe Pain., Disp: 90 tablet, Rfl: 0    [START ON 1/24/2025] oxyCODONE-acetaminophen (PERCOCET) 7.5-325 MG per tablet, Take 1 tablet by mouth Every 8 (Eight) Hours As Needed for Severe Pain., Disp: 90 tablet, Rfl: 0    pantoprazole (PROTONIX) 40 MG EC tablet, Take 1 tablet by mouth Every Night., Disp: , Rfl:     sennosides-docusate (senna-docusate sodium) 8.6-50 MG per tablet, Take 1 tablet by mouth Daily., Disp: 30 tablet, Rfl: 0    Review of Systems   Musculoskeletal:  Positive for arthralgias, back pain, gait problem and myalgias. Negative for joint swelling.   Neurological:  Positive for weakness and numbness.       Vitals:    11/22/24 1324   BP: 144/93   Pulse: 98   Resp: 16   SpO2: 96%   PainSc:   7       Urine Drug Screen: 4/27/2023  Appropriate: Yes    Objective   Physical Exam  Vitals and nursing note reviewed. Exam conducted with a chaperone present.   Constitutional:       General: She is not in acute distress.     Appearance: Normal appearance. She is normal weight.   Musculoskeletal:      Comments: Right foot is tender to palpation especially below the knee.  She also has decreased capillary refill in the right lower extremity compared to the left.  She shows decreased hair growth as well as some component of swelling compared to the left foot.  She is also losing some muscle mass and muscle strength in the right foot       Neurological:      Mental Status: She is alert.      Sensory: Sensory deficit present.      Motor: Weakness present.           Assessment & Plan   Problems Addressed this Visit    None  Visit Diagnoses       Complex regional pain syndrome type 1 of right lower extremity        Relevant Medications    oxyCODONE-acetaminophen (PERCOCET) 7.5-325 MG per tablet (Start on 12/2/2024)    oxyCODONE-acetaminophen (PERCOCET) 7.5-325 MG per tablet (Start on 12/27/2024)     oxyCODONE-acetaminophen (PERCOCET) 7.5-325 MG per tablet (Start on 1/24/2025)    Pain at surgical site        Relevant Medications    oxyCODONE-acetaminophen (PERCOCET) 7.5-325 MG per tablet (Start on 12/27/2024)    S/P TKR (total knee replacement), right              Diagnoses         Codes Comments    Complex regional pain syndrome type 1 of right lower extremity     ICD-10-CM: G90.521  ICD-9-CM: 337.22     Pain at surgical site     ICD-10-CM: G89.18  ICD-9-CM: 338.18     S/P TKR (total knee replacement), right     ICD-10-CM: Z96.651  ICD-9-CM: V43.65             Plan:  Continue Percocet 7.5 mg 3 times daily  Refill gabapentin  UDS and inspect appropriate  --- Follow-up 3 months           INSPECT REPORT    As part of the patient's treatment plan, I may be prescribing controlled substances. The patient has been made aware of appropriate use of such medications, including potential risk of somnolence, limited ability to drive and/or work safely, and the potential for dependence or overdose. It has also bee made clear that these medications are for use by this patient only, without concomitant use of alcohol or other substances unless prescribed.     Patient has completed prescribing agreement detailing terms of continued prescribing of controlled substances, including monitoring CRISSY reports, urine drug screening, and pill counts if necessary. The patient is aware that inappropriate use will results in cessation of prescribing such medications.    INSPECT report has been reviewed and scanned into the patient's chart.    As the clinician, I personally reviewed the INSPECT from 11/20/2024.    History and physical exam exhibit continued safe and appropriate use of controlled substances.      EMR Dragon/Transcription disclaimer:   Much of this encounter note is an electronic transcription/translation of spoken language to printed text. The electronic translation of spoken language may permit erroneous, or at times,  nonsensical words or phrases to be inadvertently transcribed; Although I have reviewed the note for such errors, some may still exist.

## 2025-01-02 ENCOUNTER — LAB (OUTPATIENT)
Dept: LAB | Facility: HOSPITAL | Age: 45
End: 2025-01-02
Payer: MEDICAID

## 2025-01-02 LAB
ANION GAP SERPL CALCULATED.3IONS-SCNC: 10 MMOL/L (ref 5–15)
BASOPHILS # BLD AUTO: 0.05 10*3/MM3 (ref 0–0.2)
BASOPHILS NFR BLD AUTO: 0.5 % (ref 0–1.5)
BUN SERPL-MCNC: 12 MG/DL (ref 6–20)
BUN/CREAT SERPL: 17.1 (ref 7–25)
CALCIUM SPEC-SCNC: 9.7 MG/DL (ref 8.6–10.5)
CHLORIDE SERPL-SCNC: 106 MMOL/L (ref 98–107)
CO2 SERPL-SCNC: 25 MMOL/L (ref 22–29)
CREAT SERPL-MCNC: 0.7 MG/DL (ref 0.57–1)
DEPRECATED RDW RBC AUTO: 42.1 FL (ref 37–54)
EGFRCR SERPLBLD CKD-EPI 2021: 110.2 ML/MIN/1.73
EOSINOPHIL # BLD AUTO: 0.32 10*3/MM3 (ref 0–0.4)
EOSINOPHIL NFR BLD AUTO: 3 % (ref 0.3–6.2)
ERYTHROCYTE [DISTWIDTH] IN BLOOD BY AUTOMATED COUNT: 13.3 % (ref 12.3–15.4)
GLUCOSE SERPL-MCNC: 94 MG/DL (ref 65–99)
HCT VFR BLD AUTO: 45.8 % (ref 34–46.6)
HGB BLD-MCNC: 15.5 G/DL (ref 12–15.9)
IMM GRANULOCYTES # BLD AUTO: 0.05 10*3/MM3 (ref 0–0.05)
IMM GRANULOCYTES NFR BLD AUTO: 0.5 % (ref 0–0.5)
LYMPHOCYTES # BLD AUTO: 3.26 10*3/MM3 (ref 0.7–3.1)
LYMPHOCYTES NFR BLD AUTO: 30.2 % (ref 19.6–45.3)
MCH RBC QN AUTO: 29.1 PG (ref 26.6–33)
MCHC RBC AUTO-ENTMCNC: 33.8 G/DL (ref 31.5–35.7)
MCV RBC AUTO: 86.1 FL (ref 79–97)
MONOCYTES # BLD AUTO: 0.82 10*3/MM3 (ref 0.1–0.9)
MONOCYTES NFR BLD AUTO: 7.6 % (ref 5–12)
NEUTROPHILS NFR BLD AUTO: 58.2 % (ref 42.7–76)
NEUTROPHILS NFR BLD AUTO: 6.29 10*3/MM3 (ref 1.7–7)
NRBC BLD AUTO-RTO: 0 /100 WBC (ref 0–0.2)
PLATELET # BLD AUTO: 362 10*3/MM3 (ref 140–450)
PMV BLD AUTO: 10.4 FL (ref 6–12)
POTASSIUM SERPL-SCNC: 4.4 MMOL/L (ref 3.5–5.2)
RBC # BLD AUTO: 5.32 10*6/MM3 (ref 3.77–5.28)
SODIUM SERPL-SCNC: 141 MMOL/L (ref 136–145)
WBC NRBC COR # BLD AUTO: 10.79 10*3/MM3 (ref 3.4–10.8)

## 2025-01-02 PROCEDURE — 36415 COLL VENOUS BLD VENIPUNCTURE: CPT | Performed by: PODIATRIST

## 2025-01-02 PROCEDURE — 85025 COMPLETE CBC W/AUTO DIFF WBC: CPT | Performed by: PODIATRIST

## 2025-01-02 PROCEDURE — 80048 BASIC METABOLIC PNL TOTAL CA: CPT | Performed by: PODIATRIST

## 2025-01-07 ENCOUNTER — DOCUMENTATION (OUTPATIENT)
Dept: PHYSICAL THERAPY | Facility: CLINIC | Age: 45
End: 2025-01-07
Payer: MEDICAID

## 2025-01-07 DIAGNOSIS — Z96.651 STATUS POST TOTAL RIGHT KNEE REPLACEMENT: Primary | ICD-10-CM

## 2025-01-07 NOTE — PROGRESS NOTES
Discharge Summary  Discharge Summary from Physical Therapy Report                               [address], [address], [place]  [address]    Dates  PT visit: 7/3/2024-7/25/2024          Number of Visits: 4     Comments Pt opts to DC to [facility]    Date of Discharge 1/7/25        Ciarra Serrano, PT, DPT, cert. DN  Physical Therapist  [place] Lic# [id number]

## 2025-01-08 ENCOUNTER — TELEPHONE (OUTPATIENT)
Dept: ORTHOPEDIC SURGERY | Facility: CLINIC | Age: 45
End: 2025-01-08

## 2025-01-09 ENCOUNTER — ANESTHESIA EVENT (OUTPATIENT)
Dept: PERIOP | Facility: HOSPITAL | Age: 45
End: 2025-01-09
Payer: MEDICAID

## 2025-01-10 ENCOUNTER — ANESTHESIA (OUTPATIENT)
Dept: PERIOP | Facility: HOSPITAL | Age: 45
End: 2025-01-10
Payer: MEDICAID

## 2025-01-22 ENCOUNTER — TELEPHONE (OUTPATIENT)
Dept: PODIATRY | Facility: CLINIC | Age: 45
End: 2025-01-22
Payer: MEDICAID

## 2025-01-22 NOTE — TELEPHONE ENCOUNTER
Caller: KYLAH LOCKETT    Relationship: SELF    Best call back number:     Who are you requesting to speak with (clinical staff, provider,  specific staff member): CLINICAL    What was the call regarding: MS STILL IS HAVING SX ON 1/27/25. SHE IS NOT SURE WHAT TO DO ABOUT PAIN MEDICATION AFTER HER SURGERY BECAUSE SHE WAS SEEING DR MCCOY WITH [facility] BUT HE LEFT AND SHE DOESN'T HAVE AN APPT WITH HER NEW PAIN MANAGEMENT DR, DR CHRISTY, UNTIL FABRUARY    Is it okay if the provider responds through MyChart: CALL

## 2025-01-27 ENCOUNTER — HOSPITAL ENCOUNTER (OUTPATIENT)
Facility: HOSPITAL | Age: 45
Setting detail: HOSPITAL OUTPATIENT SURGERY
Discharge: HOME | End: 2025-01-27
Attending: PODIATRIST | Admitting: PODIATRIST
Payer: MEDICAID

## 2025-01-27 ENCOUNTER — APPOINTMENT (OUTPATIENT)
Dept: GENERAL RADIOLOGY | Facility: HOSPITAL | Age: 45
End: 2025-01-27
Payer: MEDICAID

## 2025-01-27 VITALS
SYSTOLIC BLOOD PRESSURE: 122 MMHG | OXYGEN SATURATION: 97 % | DIASTOLIC BLOOD PRESSURE: 80 MMHG | HEIGHT: 64 IN | WEIGHT: 188.2 LBS | TEMPERATURE: 97.8 F | BODY MASS INDEX: 32.13 KG/M2 | RESPIRATION RATE: 20 BRPM | HEART RATE: 93 BPM

## 2025-01-27 DIAGNOSIS — M25.371 ANKLE INSTABILITY, RIGHT: ICD-10-CM

## 2025-01-27 DIAGNOSIS — M76.71 PERONEAL TENDINITIS OF RIGHT LOWER EXTREMITY: ICD-10-CM

## 2025-01-27 DIAGNOSIS — G90.521 COMPLEX REGIONAL PAIN SYNDROME TYPE 1 OF RIGHT LOWER EXTREMITY: ICD-10-CM

## 2025-01-27 DIAGNOSIS — G57.81 SURAL NEUROPATHY, RIGHT: ICD-10-CM

## 2025-01-27 PROCEDURE — 25010000002 PROPOFOL 10 MG/ML EMULSION: Performed by: NURSE ANESTHETIST, CERTIFIED REGISTERED

## 2025-01-27 PROCEDURE — 64787 IMPLANT NERVE END: CPT | Performed by: PODIATRIST

## 2025-01-27 PROCEDURE — 27626 REMOVE ANKLE JOINT LINING: CPT | Performed by: PODIATRIST

## 2025-01-27 PROCEDURE — C1713 ANCHOR/SCREW BN/BN,TIS/BN: HCPCS | Performed by: PODIATRIST

## 2025-01-27 PROCEDURE — 28055 NEURECTOMY FOOT: CPT | Performed by: PODIATRIST

## 2025-01-27 PROCEDURE — 29898 ANKLE ARTHROSCOPY/SURGERY: CPT | Performed by: PODIATRIST

## 2025-01-27 PROCEDURE — 73600 X-RAY EXAM OF ANKLE: CPT

## 2025-01-27 PROCEDURE — 27829 TREAT LOWER LEG JOINT: CPT | Performed by: PODIATRIST

## 2025-01-27 PROCEDURE — 25010000002 ROPIVACAINE PER 1 MG: Performed by: ANESTHESIOLOGY

## 2025-01-27 PROCEDURE — 76000 FLUOROSCOPY <1 HR PHYS/QHP: CPT

## 2025-01-27 PROCEDURE — 25010000002 MIDAZOLAM PER 1 MG: Performed by: ANESTHESIOLOGY

## 2025-01-27 PROCEDURE — 25810000003 LACTATED RINGERS PER 1000 ML: Performed by: NURSE ANESTHETIST, CERTIFIED REGISTERED

## 2025-01-27 PROCEDURE — 25010000002 ONDANSETRON PER 1 MG: Performed by: NURSE ANESTHETIST, CERTIFIED REGISTERED

## 2025-01-27 PROCEDURE — 25010000002 CEFAZOLIN PER 500 MG: Performed by: PODIATRIST

## 2025-01-27 PROCEDURE — 27696 REPAIR OF ANKLE LIGAMENTS: CPT | Performed by: PODIATRIST

## 2025-01-27 PROCEDURE — 25010000002 FENTANYL CITRATE (PF) 100 MCG/2ML SOLUTION: Performed by: NURSE ANESTHETIST, CERTIFIED REGISTERED

## 2025-01-27 PROCEDURE — 25010000002 DEXAMETHASONE PER 1 MG: Performed by: NURSE ANESTHETIST, CERTIFIED REGISTERED

## 2025-01-27 PROCEDURE — S0260 H&P FOR SURGERY: HCPCS | Performed by: PODIATRIST

## 2025-01-27 DEVICE — SYS SYNDESMOSIS REPR ANKL TIGHTROPE/XP KNOTLS TI: Type: IMPLANTABLE DEVICE | Site: ANKLE | Status: FUNCTIONAL

## 2025-01-27 DEVICE — SUT FW 2/0 TPR NDL 18MM AR7220: Type: IMPLANTABLE DEVICE | Site: ANKLE | Status: FUNCTIONAL

## 2025-01-27 DEVICE — SUT/ANCH BIOCOMP SWIVELOCK/C 4.75X19.1MM: Type: IMPLANTABLE DEVICE | Site: ANKLE | Status: FUNCTIONAL

## 2025-01-27 DEVICE — SYS IMP ANKL INTERNALBRACE AUG LIGMT BIOCOMP STD: Type: IMPLANTABLE DEVICE | Site: ANKLE | Status: FUNCTIONAL

## 2025-01-27 RX ORDER — ONDANSETRON 2 MG/ML
INJECTION INTRAMUSCULAR; INTRAVENOUS AS NEEDED
Status: DISCONTINUED | OUTPATIENT
Start: 2025-01-27 | End: 2025-01-27 | Stop reason: SURG

## 2025-01-27 RX ORDER — PROPOFOL 10 MG/ML
VIAL (ML) INTRAVENOUS AS NEEDED
Status: DISCONTINUED | OUTPATIENT
Start: 2025-01-27 | End: 2025-01-27 | Stop reason: SURG

## 2025-01-27 RX ORDER — OXYCODONE AND ACETAMINOPHEN 7.5; 325 MG/1; MG/1
1 TABLET ORAL EVERY 6 HOURS PRN
Qty: 28 TABLET | Refills: 0 | Status: SHIPPED | OUTPATIENT
Start: 2025-01-27

## 2025-01-27 RX ORDER — ROPIVACAINE HYDROCHLORIDE 5 MG/ML
INJECTION, SOLUTION EPIDURAL; INFILTRATION; PERINEURAL
Status: COMPLETED | OUTPATIENT
Start: 2025-01-27 | End: 2025-01-27

## 2025-01-27 RX ORDER — HYDRALAZINE HYDROCHLORIDE 20 MG/ML
5 INJECTION INTRAMUSCULAR; INTRAVENOUS
Status: DISCONTINUED | OUTPATIENT
Start: 2025-01-27 | End: 2025-01-27 | Stop reason: HOSPADM

## 2025-01-27 RX ORDER — LIDOCAINE HYDROCHLORIDE 10 MG/ML
0.5 INJECTION, SOLUTION EPIDURAL; INFILTRATION; INTRACAUDAL; PERINEURAL ONCE AS NEEDED
Status: DISCONTINUED | OUTPATIENT
Start: 2025-01-27 | End: 2025-01-27 | Stop reason: HOSPADM

## 2025-01-27 RX ORDER — SODIUM CHLORIDE, SODIUM LACTATE, POTASSIUM CHLORIDE, CALCIUM CHLORIDE 600; 310; 30; 20 MG/100ML; MG/100ML; MG/100ML; MG/100ML
1000 INJECTION, SOLUTION INTRAVENOUS ONCE
Status: DISCONTINUED | OUTPATIENT
Start: 2025-01-27 | End: 2025-01-27 | Stop reason: HOSPADM

## 2025-01-27 RX ORDER — LABETALOL HYDROCHLORIDE 5 MG/ML
5 INJECTION, SOLUTION INTRAVENOUS
Status: DISCONTINUED | OUTPATIENT
Start: 2025-01-27 | End: 2025-01-27 | Stop reason: HOSPADM

## 2025-01-27 RX ORDER — MIDAZOLAM HYDROCHLORIDE 1 MG/ML
INJECTION, SOLUTION INTRAMUSCULAR; INTRAVENOUS AS NEEDED
Status: DISCONTINUED | OUTPATIENT
Start: 2025-01-27 | End: 2025-01-27 | Stop reason: SURG

## 2025-01-27 RX ORDER — FENTANYL CITRATE 50 UG/ML
INJECTION, SOLUTION INTRAMUSCULAR; INTRAVENOUS AS NEEDED
Status: DISCONTINUED | OUTPATIENT
Start: 2025-01-27 | End: 2025-01-27 | Stop reason: SURG

## 2025-01-27 RX ORDER — SODIUM CHLORIDE, SODIUM LACTATE, POTASSIUM CHLORIDE, CALCIUM CHLORIDE 600; 310; 30; 20 MG/100ML; MG/100ML; MG/100ML; MG/100ML
INJECTION, SOLUTION INTRAVENOUS CONTINUOUS PRN
Status: DISCONTINUED | OUTPATIENT
Start: 2025-01-27 | End: 2025-01-27 | Stop reason: SURG

## 2025-01-27 RX ORDER — FLUMAZENIL 0.1 MG/ML
0.2 INJECTION INTRAVENOUS AS NEEDED
Status: DISCONTINUED | OUTPATIENT
Start: 2025-01-27 | End: 2025-01-27 | Stop reason: HOSPADM

## 2025-01-27 RX ORDER — IPRATROPIUM BROMIDE AND ALBUTEROL SULFATE 2.5; .5 MG/3ML; MG/3ML
3 SOLUTION RESPIRATORY (INHALATION) ONCE AS NEEDED
Status: DISCONTINUED | OUTPATIENT
Start: 2025-01-27 | End: 2025-01-27 | Stop reason: HOSPADM

## 2025-01-27 RX ORDER — EPHEDRINE SULFATE 5 MG/ML
INJECTION INTRAVENOUS AS NEEDED
Status: DISCONTINUED | OUTPATIENT
Start: 2025-01-27 | End: 2025-01-27 | Stop reason: SURG

## 2025-01-27 RX ORDER — EPHEDRINE SULFATE 5 MG/ML
5 INJECTION INTRAVENOUS ONCE AS NEEDED
Status: DISCONTINUED | OUTPATIENT
Start: 2025-01-27 | End: 2025-01-27 | Stop reason: HOSPADM

## 2025-01-27 RX ORDER — OXYCODONE HYDROCHLORIDE 5 MG/1
10 TABLET ORAL EVERY 4 HOURS PRN
Status: DISCONTINUED | OUTPATIENT
Start: 2025-01-27 | End: 2025-01-27 | Stop reason: HOSPADM

## 2025-01-27 RX ORDER — FENTANYL CITRATE 50 UG/ML
50 INJECTION, SOLUTION INTRAMUSCULAR; INTRAVENOUS
Status: DISCONTINUED | OUTPATIENT
Start: 2025-01-27 | End: 2025-01-27 | Stop reason: HOSPADM

## 2025-01-27 RX ORDER — DIPHENHYDRAMINE HYDROCHLORIDE 50 MG/ML
12.5 INJECTION INTRAMUSCULAR; INTRAVENOUS ONCE AS NEEDED
Status: DISCONTINUED | OUTPATIENT
Start: 2025-01-27 | End: 2025-01-27 | Stop reason: HOSPADM

## 2025-01-27 RX ORDER — OXYCODONE HYDROCHLORIDE 5 MG/1
5 TABLET ORAL ONCE AS NEEDED
Status: DISCONTINUED | OUTPATIENT
Start: 2025-01-27 | End: 2025-01-27 | Stop reason: HOSPADM

## 2025-01-27 RX ORDER — NALOXONE HCL 0.4 MG/ML
0.4 VIAL (ML) INJECTION AS NEEDED
Status: DISCONTINUED | OUTPATIENT
Start: 2025-01-27 | End: 2025-01-27 | Stop reason: HOSPADM

## 2025-01-27 RX ORDER — DIPHENHYDRAMINE HYDROCHLORIDE 50 MG/ML
12.5 INJECTION INTRAMUSCULAR; INTRAVENOUS
Status: DISCONTINUED | OUTPATIENT
Start: 2025-01-27 | End: 2025-01-27 | Stop reason: HOSPADM

## 2025-01-27 RX ORDER — ONDANSETRON 2 MG/ML
4 INJECTION INTRAMUSCULAR; INTRAVENOUS ONCE AS NEEDED
Status: DISCONTINUED | OUTPATIENT
Start: 2025-01-27 | End: 2025-01-27 | Stop reason: HOSPADM

## 2025-01-27 RX ORDER — DEXAMETHASONE SODIUM PHOSPHATE 4 MG/ML
INJECTION, SOLUTION INTRA-ARTICULAR; INTRALESIONAL; INTRAMUSCULAR; INTRAVENOUS; SOFT TISSUE AS NEEDED
Status: DISCONTINUED | OUTPATIENT
Start: 2025-01-27 | End: 2025-01-27 | Stop reason: SURG

## 2025-01-27 RX ORDER — SODIUM CHLORIDE 0.9 % (FLUSH) 0.9 %
10 SYRINGE (ML) INJECTION AS NEEDED
Status: DISCONTINUED | OUTPATIENT
Start: 2025-01-27 | End: 2025-01-27 | Stop reason: HOSPADM

## 2025-01-27 RX ADMIN — EPHEDRINE SULFATE 10 MG: 5 INJECTION INTRAVENOUS at 14:08

## 2025-01-27 RX ADMIN — FENTANYL CITRATE 50 MCG: 50 INJECTION, SOLUTION INTRAMUSCULAR; INTRAVENOUS at 14:03

## 2025-01-27 RX ADMIN — ROPIVACAINE HYDROCHLORIDE 20 ML: 5 INJECTION, SOLUTION EPIDURAL; INFILTRATION; PERINEURAL at 13:36

## 2025-01-27 RX ADMIN — EPHEDRINE SULFATE 10 MG: 5 INJECTION INTRAVENOUS at 14:05

## 2025-01-27 RX ADMIN — ONDANSETRON 4 MG: 2 INJECTION INTRAMUSCULAR; INTRAVENOUS at 14:22

## 2025-01-27 RX ADMIN — SODIUM CHLORIDE, SODIUM LACTATE, POTASSIUM CHLORIDE, AND CALCIUM CHLORIDE: .6; .31; .03; .02 INJECTION, SOLUTION INTRAVENOUS at 13:45

## 2025-01-27 RX ADMIN — FENTANYL CITRATE 50 MCG: 50 INJECTION, SOLUTION INTRAMUSCULAR; INTRAVENOUS at 13:55

## 2025-01-27 RX ADMIN — MIDAZOLAM 2 MG: 1 INJECTION INTRAMUSCULAR; INTRAVENOUS at 13:32

## 2025-01-27 RX ADMIN — EPHEDRINE SULFATE 10 MG: 5 INJECTION INTRAVENOUS at 14:19

## 2025-01-27 RX ADMIN — ROPIVACAINE HYDROCHLORIDE 20 ML: 5 INJECTION EPIDURAL; INFILTRATION; PERINEURAL at 13:35

## 2025-01-27 RX ADMIN — EPHEDRINE SULFATE 10 MG: 5 INJECTION INTRAVENOUS at 14:28

## 2025-01-27 RX ADMIN — DEXAMETHASONE SODIUM PHOSPHATE 4 MG: 4 INJECTION, SOLUTION INTRAMUSCULAR; INTRAVENOUS at 14:22

## 2025-01-27 RX ADMIN — EPHEDRINE SULFATE 10 MG: 5 INJECTION INTRAVENOUS at 14:34

## 2025-01-27 RX ADMIN — CEFAZOLIN 2000 MG: 2 INJECTION, POWDER, FOR SOLUTION INTRAMUSCULAR; INTRAVENOUS at 13:41

## 2025-01-27 RX ADMIN — PROPOFOL 200 MG: 10 INJECTION, EMULSION INTRAVENOUS at 13:51

## 2025-01-27 NOTE — ANESTHESIA PROCEDURE NOTES
Peripheral Block    Pre-sedation assessment completed: 1/27/2025 1:25 PM    Patient reassessed immediately prior to procedure    Patient location during procedure: pre-op  Start time: 1/27/2025 1:25 PM  Stop time: 1/27/2025 1:35 PM  Reason for block: at surgeon's request and post-op pain management  Performed by  Anesthesiologist: Omar Carreno MD  Preanesthetic Checklist  Completed: patient identified, risks and benefits discussed, surgical consent, monitors and equipment checked, pre-op evaluation and timeout performed  Prep:  Pt Position: supine  Prep: ChloraPrep  Patient monitoring: blood pressure monitoring, continuous pulse oximetry and EKG  Procedure    Sedation: yes  Performed under: local infiltration  Guidance:ultrasound guided    Nerve locations: Adductor Canal. Images:still images obtained, printed/placed on chart    Laterality:right  Block Type:adductor canal block  Injection Technique:single-shot    Medications Used: ropivacaine (NAROPIN) 0.5 % injection - Perineural   20 mL - 1/27/2025 1:35:00 PM      Post Assessment  Injection Assessment: negative aspiration for heme, no paresthesia on injection and incremental injection  Patient Tolerance:comfortable throughout block  Complications:no  Additional Notes  Ultrasound Interpretation:  Using ultrasound guidance the needle was placed in close proximity to the nerve and anesthetic was injected in the area of the nerve and spread of the anesthetic was seen on ultrasound in close proximity thereto.  There were no abnormalities seen on ultrasound; a digital image was taken; and the patient tolerated the procedure with no complications.       Performed by: Omar Carreno MD

## 2025-01-27 NOTE — ANESTHESIA PROCEDURE NOTES
Airway  Urgency: elective    Date/Time: 1/27/2025 1:53 PM  Airway not difficult    General Information and Staff    Patient location during procedure: OR  Anesthesiologist: Omar Carreno MD  CRNA/CAA: Jony Ponce CRNA    Indications and Patient Condition  Indications for airway management: airway protection    Preoxygenated: yes  MILS maintained throughout  Mask difficulty assessment: 0 - not attempted    Final Airway Details  Final airway type: supraglottic airway      Successful airway: I-gel  Size 4     Number of attempts at approach: 1  Assessment: lips, teeth, and gum same as pre-op and atraumatic intubation
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
## 2025-01-27 NOTE — ANESTHESIA PROCEDURE NOTES
Peripheral Block    Pre-sedation assessment completed: 1/27/2025 1:32 PM    Patient reassessed immediately prior to procedure    Patient location during procedure: pre-op  Start time: 1/27/2025 1:32 PM  Stop time: 1/27/2025 1:36 PM  Reason for block: at surgeon's request and post-op pain management  Performed by  Anesthesiologist: Omar Carreno MD  Preanesthetic Checklist  Completed: patient identified, risks and benefits discussed, surgical consent, monitors and equipment checked, pre-op evaluation and timeout performed  Prep:  Pt Position: supine  Sterile barriers:mask and cap  Prep: ChloraPrep  Patient monitoring: blood pressure monitoring, continuous pulse oximetry and EKG  Procedure    Sedation: yes  Performed under: local infiltration  Guidance:ultrasound guided and nerve stimulator  Images:still images obtained, printed/placed on chart    Laterality:right  Block Type:popliteal  Injection Technique:single-shotNeedle Gauge:21 G      Medications Used: ropivacaine (NAROPIN) 0.5 % injection - Perineural   20 mL - 1/27/2025 1:36:00 PM      Post Assessment  Injection Assessment: negative aspiration for heme, incremental injection and no paresthesia on injection  Patient Tolerance:comfortable throughout block  Complications:no  Performed by: Omar Carreno MD

## 2025-01-27 NOTE — ANESTHESIA POSTPROCEDURE EVALUATION
Patient: Vidya Becerra    Procedure Summary       Date: 01/27/25 Room / Location: [facility] OR 01 / [facility] MAIN OR    Anesthesia Start: 1345 Anesthesia Stop: 1559    Procedures:       ANKLE ARTHROSCOPY (Right: Ankle)      Anterior talofibular ligament and calcaneofibular ligament repairs (Right: Ankle)      Peroneal tendon evaluation and sural neurectomy (Right) Diagnosis:       Peroneal tendinitis of right lower extremity      Sural neuropathy, right      Ankle instability, right      (Peroneal tendinitis of right lower extremity [M76.71])      (Sural neuropathy, right [G57.81])      (Ankle instability, right [M25.371])    Surgeons: JESSICA Malloy DPM Provider: Omar Carreno MD    Anesthesia Type: general with block ASA Status: 3            Anesthesia Type: general with block    Vitals  Vitals Value Taken Time   /84 01/27/25 1659   Temp 98.6 °F (37 °C) 01/27/25 1658   Pulse 93 01/27/25 1701   Resp 21 01/27/25 1658   SpO2 93 % 01/27/25 1701   Vitals shown include unfiled device data.        Post Anesthesia Care and Evaluation    Patient location during evaluation: PACU  Patient participation: complete - patient participated  Level of consciousness: awake  Pain scale: See nurse's notes for pain score.  Pain management: adequate    Airway patency: patent  Anesthetic complications: No anesthetic complications  PONV Status: none  Cardiovascular status: acceptable  Respiratory status: acceptable and spontaneous ventilation  Hydration status: acceptable    Comments: Patient seen and examined postoperatively; vital signs stable; SpO2 greater than or equal to 90%; cardiopulmonary status stable; nausea/vomiting adequately controlled; pain adequately controlled; no apparent anesthesia complications; patient discharged from anesthesia care when discharge criteria were met

## 2025-01-27 NOTE — ANESTHESIA PREPROCEDURE EVALUATION
Anesthesia Evaluation     Patient summary reviewed and Nursing notes reviewed   NPO Solid Status: > 8 hours             Airway   Mallampati: II  TM distance: >3 FB  Neck ROM: full  No difficulty expected  Dental - normal exam     Pulmonary - negative pulmonary ROS and normal exam   (+) a smoker Current, cigarettes,  Cardiovascular - negative cardio ROS and normal exam    ECG reviewed    (+) hypertension, hyperlipidemia  (-) angina, JUDGE      Neuro/Psych- negative ROS  (+) headaches, psychiatric history Anxiety and Depression  GI/Hepatic/Renal/Endo - negative ROS   (+) obesity, GERD    Musculoskeletal (-) negative ROS    Abdominal  - normal exam    Bowel sounds: normal.   Substance History - negative use     OB/GYN negative ob/gyn ROS         Other   arthritis,                 Anesthesia Plan    ASA 3     general with block       Anesthetic plan, risks, benefits, and alternatives have been provided, discussed and informed consent has been obtained with: patient.    CODE STATUS:

## 2025-01-27 NOTE — OP NOTE
Operative Note   Foot and Ankle Surgery   Provider: Dr. Domenic Malloy   Location: [facility]      Procedure:  1.  Ankle arthroscopy with extensive debridement, right - 96035  2.  Primary repair of both collateral ankle ligaments, right - 93339  3.  Peroneal tenosynovectomy, right - 32721  4.  Sural neurectomy, right - 72062  5.  Implantation of sural nerve into peroneus brevis muscle, right - 57397  6.  Syndesmotic repair, right - 30140    Pre-operative Diagnosis:   1.  Chronic right ankle pain  2.  Ankle instability, right  3.  Peroneal tendinopathy, right  4.  Sural neuropathy, right    Post-operative Diagnosis:   1.  Chronic right ankle pain  2.  Ankle instability, right  3.  Peroneal tendinopathy, right  4.  Sural neuropathy, right  5.  Syndesmotic disruption, right    Surgeon: Domenic Malloy    Assistant: Mayte English PGY-1    Anesthesia: General With block    Implants: Arthrex 3.5 mm and 4.75 mm x 2 swivel lock anchors with internal brace; Arthrex Tight Rope    Findings: Significant ligament laxity involving the anterior lateral aspect of the ankle directly noted with arthroscopy.  Prominent chronic synovitis involving the lateral aspect of the ankle and at the level of the syndesmosis indicating disruption.  Prominent adhesions and entrapment of the sural nerve at the level of the distal fibula likely causing patient's paresthesias and neuropathy.  Moderate tenosynovitis involving the peroneal tendons without obvious tendon tear or disruption.    Specimen: None    Blood Loss: Less than 5cc    Complications: None    Post Op Plan: Discharge home.  Strict nonweightbearing activity.  Follow-up with me in 2 weeks.    Summary:    Patient is a 44-year-old female that has seen me in office for chronic pain involving her right lower extremity.  Patient has had multiple surgeries in the past and continues to deal with prominent discomfort involving the lateral aspect of her ankle.  Patient has undergone  previous midfoot surgery and continues to do well at this level.  A majority of her pain is located to the lateral aspect of the ankle.  She has had previous MRI.  Clinically, she has substantial instability about the ankle.  She also has prominent paresthesias which is concerning for sural nerve entrapment/neuropathy.  We have performed multiple conservative treatments without any significant improvement.  I did discuss surgical intervention which would require ankle arthroscopy, collateral ligament repair, and peroneal tendon and sural nerve evaluation.  Patient understands that she may continue to have pain and limitation despite surgery.  Patient would like to proceed with surgery at this time.    Procedure, risks, complications, and goals were discussed with the patient at bedside.  Risks include but are not limited to infection, complications from anesthesia (including death), chronic pain or numbness, hematoma/seroma, deep vein thrombosis, wound complications, and potential for additional surgical procedures.  Patient understands and elects to proceed with surgery at this time. Informed consent was obtained before proceeding to the operating suite.  All questions were answered to the patient's satisfaction. No guarantees or assurances were given or implied.    Procedure:    Patient was brought to the operating room and placed on the operative table in supine position.  Once adequate general anesthesia was administered, a pneumatic tourniquet was placed about the patient's operative thigh.  The lower extremity was positioned, scrubbed, and prepped in the standard fashion.   A formal time-out was conducted prior skin incision. The limb was elevated exsanguinated and the pneumatic tourniquet was inflated to 300 mm of mercury.     Attention was directed to the anterior aspect of the ankle.  The anterior medial portal was identified medial to the tibialis anterior tendon. A small stab incision was performed with  blunt dissection to the level of the joint. The joint was insufflated with approximately 15 cc of normal saline.  The cannula and trocar were introduced into the ankle followed by the arthroscope.  The ankle joint was evaluated showing prominent synovitis involving the anterior and lateral aspects of the ankle.  The syndesmosis was also evaluated showing prominent chronic synovitis indicating syndesmotic disruption.  A significant amount of instability was directly visualized with ankle range of motion. No OCD lesions or other concerning features were identified. The anterior lateral portal was obtained under direct visualization.  Again a stab incision with blunt dissection was performed to the level of the capsule.  The trocar was introduced followed by a 3 mm shaver.  The synovitis was resected without complication.  Additional testing of the syndesmotic ligament was performed with prominent gapping.  Final images were obtained.  The ankle joint was irrigated with copious amounts of normal saline.  All instrumentation was removed and the ankle was decompressed.  The medial stab incision was closed with 3-0 nylon in a simple interrupted manner.    Attention was then directed to the lateral aspect of the ankle.  The anterior talofibular ligament repair was performed through a curvilinear incision starting from the anterior lateral portal and extending distally along the anterior aspect of the fibula.  The incision was opened in layers to the level of the ankle joint capsule.  Vital structures were identified and preserved.  The anterior aspect of the distal fibula was exposed with Bovie cautery.  The bone was identified and visualized.  Under drilling was performed followed by a tap for 3.5 mm swivel lock anchor with attached fiber tape.  This was performed without complication.  Another small stab incision was performed overlying the anterior distal lateral malleolus and the body and neck of the talus.  Blunt  dissection was performed allowing full visualization of the underlying bone.  The strand of the FiberTape was then routed through the ankle joint capsule towards the talus.  Drilling and a tap was performed for a 4.75 mm swivel lock anchor.  The ankle was held in neutral position and the anchor was placed without complication allowing for appropriate tension across the replaced ATFL dramatically reducing the anterior excursion of the talus. The wound was irrigated with copious amounts normal saline.      Attention was then directed to the lateral aspect of the ankle.  A curvilinear incision was performed following previous surgical site to the posterior lateral aspect of the ankle.  The incision extended from proximal to the ankle towards the fifth metatarsal base.  The incision was opened to the subcutaneous tissue.  Upon subcutaneous tissue dissection, the sural nerve was readily identified.  The proximal and distal courses of the nerve were identified but the central portion posterior to the fibula was entrapped with soft tissue adhesions.  The nerve was retracted during that time and attention was placed to the peroneal tendon sheath was each was atrophic and attenuated.  The peroneal tendon sheath was incised posteriorly giving access to the peroneal tendons.  A moderate amount of tenosynovitis was present to the peroneal tendon sheath.  No obvious tendon tears or ruptures were identified.  The tenosynovitis was resected without complication.     The last strand of fiber tape was then routed extracapsular towards the lateral aspect of the calcaneus. The peroneal tendons were identified and retracted giving visualization to the calcaneofibular ligament insertion site.  Again a drill and tap for a 4.75 mm anchor was performed.  The anchor with the fiber tape was applied to the calcaneus with the rear foot and ankle in a neutral position.  The remainder of the fiber tape both to the ATFL and CFL repair sites were  trimmed without complication.  The peroneal tendons and sheath were irrigated with copious amounts of normal saline.  The peroneal tendon sheath was repaired distal to proximal with a 2-0 Vicryl in a simple interrupted manner. A small amount of the tendon sheath was remained open proximally to allow for sural nerve implantation.    Attention was then placed back to the sural nerve.  Due to the amount of adhesions and attenuation noted to the sural nerve, the decision was made to proceed with neurectomy proximal to the ankle.  This was performed without complication.  The distal end of the sural nerve was then implanted into the peroneus brevis muscle belly with a 4-0 Vicryl in a simple interrupted manner.    Lastly, the syndesmosis was addressed.  Under fluoroscopic guidance, a large periarticular clamp was applied to the medial and lateral malleoli.  The ankle was maximally dorsiflexed and the clamp was applied allowing for compression and reduction across the syndesmosis.  A guidewire was placed from a lateral to medial orientation.  Once appropriate orientation was identified, drilling was performed under fluoroscopy.  Finally the device was deployed and placed without complication.  The clamp was removed and final imaging was performed showing excellent placement and reduction of the syndesmosis.    The ankle and subtalar joints were stressed showing significant improvement as compared to preoperative assessment.  The incision sites were irrigated with normal saline.  The deep structures were closed with a 2-0 Vicryl in a simple interrupted manner. The skin was reapproximated with 3-0 nylon sutures.  The incisions were dressed with Xeroform and sterile compressive dressings.  The tourniquet was released and a prompt hyperemic response was noted to all digits of the right lower extremity.  Patient tolerated the procedure and anesthesia well.  The limb was placed into a well-padded posterior splint.  She was  transferred from the operating room to the recovery room with vital signs stable and neurovascular status unchanged to the right lower extremity.      Dr. Domenic Malloy, DARIA  [facility]  [phone]    Note is dictated utilizing voice recognition software. Unfortunately this leads to occasional typographical errors. I apologize in advance if the situation occurs. If questions occur please do not hesitate to call our office.

## 2025-02-04 ENCOUNTER — TELEPHONE (OUTPATIENT)
Dept: PAIN MEDICINE | Facility: CLINIC | Age: 45
End: 2025-02-04
Payer: MEDICAID

## 2025-02-04 DIAGNOSIS — G90.521 COMPLEX REGIONAL PAIN SYNDROME TYPE 1 OF RIGHT LOWER EXTREMITY: ICD-10-CM

## 2025-02-04 RX ORDER — OXYCODONE AND ACETAMINOPHEN 7.5; 325 MG/1; MG/1
1 TABLET ORAL EVERY 8 HOURS PRN
COMMUNITY
End: 2025-02-04 | Stop reason: SDUPTHER

## 2025-02-04 RX ORDER — GABAPENTIN 800 MG/1
800 TABLET ORAL 3 TIMES DAILY
Qty: 90 TABLET | Refills: 2 | Status: CANCELLED | OUTPATIENT
Start: 2025-02-04

## 2025-02-04 RX ORDER — OXYCODONE AND ACETAMINOPHEN 7.5; 325 MG/1; MG/1
1 TABLET ORAL EVERY 6 HOURS PRN
Qty: 28 TABLET | Refills: 0 | Status: CANCELLED | OUTPATIENT
Start: 2025-02-04

## 2025-02-04 NOTE — TELEPHONE ENCOUNTER
Caller: Vidya Becerra    Relationship: Self    Best call back number:     Requested Prescriptions:   Requested Prescriptions     Pending Prescriptions Disp Refills    gabapentin (NEURONTIN) 800 MG tablet 90 tablet 2     Sig: Take 1 tablet by mouth 3 (Three) Times a Day. Take preop    oxyCODONE-acetaminophen (PERCOCET) 7.5-325 MG per tablet 28 tablet 0     Sig: Take 1 tablet by mouth Every 6 (Six) Hours As Needed for Severe Pain.        Pharmacy where request should be sent: [facility] #[id number] - [place], [place]  [address] - [phone]  - [phone] FX     Last office visit with prescribing clinician: Visit date not found   Last telemedicine visit with prescribing clinician: Visit date not found   Next office visit with prescribing clinician: 2/20/2025     Additional details provided by patient: PATIENT IS OUT OF OXYCODONE     Does the patient have less than a 3 day supply:  [x] Yes  [] No    Would you like a call back once the refill request has been completed: [x] Yes [] No    If the office needs to give you a call back, can they leave a voicemail: [x] Yes [] No    Chucho Stark Rep   02/04/25 14:48 EST

## 2025-02-04 NOTE — TELEPHONE ENCOUNTER
Patients prescriptions were dc when she was at hospital, Can you resend it to [facility] She had surgery and she got 7 days from surgery but she will be out soon.

## 2025-02-05 RX ORDER — OXYCODONE AND ACETAMINOPHEN 7.5; 325 MG/1; MG/1
1 TABLET ORAL EVERY 8 HOURS PRN
Qty: 20 TABLET | Refills: 0 | Status: SHIPPED | OUTPATIENT
Start: 2025-02-05 | End: 2025-02-06 | Stop reason: SDUPTHER

## 2025-02-06 NOTE — TELEPHONE ENCOUNTER
Patients pharmacy is asking for a new Rx with a different diagnosis code. Patient is also asking if this can be for a full month since she doesn't see Dr Pride until 2/20. He last Rx fro Dr. Sanon was D/C by accident.

## 2025-02-07 RX ORDER — OXYCODONE AND ACETAMINOPHEN 7.5; 325 MG/1; MG/1
1 TABLET ORAL EVERY 8 HOURS PRN
Qty: 20 TABLET | Refills: 0 | Status: SHIPPED | OUTPATIENT
Start: 2025-02-07

## 2025-02-11 ENCOUNTER — OFFICE VISIT (OUTPATIENT)
Age: 45
End: 2025-02-11
Payer: MEDICAID

## 2025-02-11 VITALS
RESPIRATION RATE: 18 BRPM | HEIGHT: 64 IN | OXYGEN SATURATION: 97 % | WEIGHT: 188 LBS | HEART RATE: 74 BPM | BODY MASS INDEX: 32.1 KG/M2

## 2025-02-11 DIAGNOSIS — M79.671 RIGHT FOOT PAIN: Primary | ICD-10-CM

## 2025-02-11 DIAGNOSIS — M76.71 PERONEAL TENDINITIS OF RIGHT LOWER EXTREMITY: ICD-10-CM

## 2025-02-11 DIAGNOSIS — M25.371 ANKLE INSTABILITY, RIGHT: ICD-10-CM

## 2025-02-11 DIAGNOSIS — G57.81 SURAL NEUROPATHY, RIGHT: ICD-10-CM

## 2025-02-11 PROCEDURE — 99024 POSTOP FOLLOW-UP VISIT: CPT | Performed by: PODIATRIST

## 2025-02-12 NOTE — PROGRESS NOTES
02/11/2025  Foot and Ankle Surgery - Established Patient/Follow-up  Provider: Dr. Domenic Malloy DPM  Location: [facility]    Subjective:  Vidya Becerra is a 44 y.o. female.     Chief Complaint   Patient presents with    Right Ankle - Pain, Post-op     Surgery 1/27/2025- 1.  Ankle arthroscopy with extensive debridement, right - 91307  2.  Primary repair of both collateral ankle ligaments, right - 33705  3.  Peroneal tenosynovectomy, right - 13428  4.  Sural neurectomy, right - 49088  5.  Implantation of sural nerve into peroneus brevis muscle, right - 28411  6.  Syndesmotic repair, right - 95057      Post-op     PCP: Jhonathan Alejo APRN  Last PCP Visit: 6/20/2024       History of Present Illness  Patient presents for follow-up 2 weeks after ankle arthroscopy, ankle collateral ligament repair, syndesmotic repair, and sternal transection and relocation.  Patient states that she is doing well.  Incision sites are dry and stable.  She has remained off weightbearing as discussed.      Allergies   Allergen Reactions    Chlorhexidine Gluconate Itching    Medrol [Methylprednisolone] Itching     Dosepak Oral steroids        Current Outpatient Medications on File Prior to Visit   Medication Sig Dispense Refill    albuterol sulfate  (90 Base) MCG/ACT inhaler Inhale 1-2 puffs As Needed for Shortness of Air or Wheezing. With bronchitis   use preop if needed bring with      amLODIPine (NORVASC) 5 MG tablet Take 1 tablet by mouth Daily.      aspirin 81 MG EC tablet Take 1 tablet by mouth 2 (Two) Times a Day. 60 tablet 0    atorvastatin (LIPITOR) 40 MG tablet TAKE 1 TABLET BY MOUTH EVERY DAY AT NIGHT 90 tablet 1    baclofen (LIORESAL) 10 MG tablet Take 1 tablet by mouth At Night As Needed for Muscle Spasms.      famotidine (PEPCID) 40 MG tablet Take 1 tablet by mouth Every Night.      gabapentin (NEURONTIN) 800 MG tablet Take 1 tablet by mouth 3 (Three) Times a Day. Take preop 90 tablet 2    Ibuprofen 3 %,  "Gabapentin 10 %, Baclofen 2 %, lidocaine 4 %, Ketamine HCl 4 % Apply 1-2 g topically to the appropriate area as directed 3 (Three) to 4 (Four) times daily. 90 g 3    meloxicam (MOBIC) 15 MG tablet Take 1 tablet by mouth Daily.      naloxone (NARCAN) 4 MG/0.1ML nasal spray Call 911. Don't prime. [place] in 1 nostril for overdose. Repeat in 2-3 minutes in other nostril if no or minimal breathing/responsiveness. 2 each 0    ofloxacin (OCUFLOX) 0.3 % ophthalmic solution INSTILL 1 DROP INTO AFFECTED EYE EVERY TWO HOURS WHILE AWAKE      oxyCODONE-acetaminophen (PERCOCET) 7.5-325 MG per tablet Take 1 tablet by mouth Every 6 (Six) Hours As Needed for Severe Pain. 28 tablet 0    oxyCODONE-acetaminophen (PERCOCET) 7.5-325 MG per tablet Take 1 tablet by mouth Every 8 (Eight) Hours As Needed for Moderate Pain or Severe Pain. 20 tablet 0     No current facility-administered medications on file prior to visit.       Objective   Pulse 74   Resp 18   Ht 162.6 cm (64\")   Wt 85.3 kg (188 lb)   SpO2 97%   BMI 32.27 kg/m²     Foot/Ankle Exam  Physical Exam  GENERAL  Orientation:  AAOx3  Affect:  appropriate     VASCULAR      Right Foot Vascularity   Normal vascular exam    Dorsalis pedis:  2+  Posterior tibial:  2+  Skin temperature:  warm  Edema grading:  None  CFT:  < 3 seconds  Pedal hair growth:  Present  Varicosities:  none      Left Foot Vascularity   Normal vascular exam    Dorsalis pedis:  2+  Posterior tibial:  2+  Skin temperature:  warm  Edema grading:  None  CFT:  < 3 seconds  Pedal hair growth:  Present  Varicosities:  none     NEUROLOGIC      Right Foot Neurologic   Light touch sensation: normal  Hot/Cold sensation: normal  Achilles reflex:  2+      Left Foot Neurologic   Light touch sensation: normal  Hot/Cold sensation:  normal  Achilles reflex:  2+     MUSCULOSKELETAL      Right Foot Musculoskeletal   Arch:  Normal      Left Foot Musculoskeletal   Arch:  Normal     MUSCLE STRENGTH      Right Foot Muscle Strength "   Normal strength    Foot dorsiflexion:  5  Foot plantar flexion:  5  Foot inversion:  5  Foot eversion:  5      Left Foot Muscle Strength   Normal strength    Foot dorsiflexion:  5  Foot plantar flexion:  5  Foot inversion:  5  Foot eversion:  5     DERMATOLOGIC       Right Foot Dermatologic   Skin  Right foot skin is intact.       Left Foot Dermatologic   Skin  Left foot skin is intact.      TESTS      Right Foot Tests   Anterior drawer: negative  Varus tilt: negative      Left Foot Tests   Anterior drawer: negative  Varus tilt: negative     Right foot additional comments: Moderate discomfort with palpation to the midfoot. Stable scar formations involving the dorsal aspect of the foot and lateral aspect of the rear foot. No open wounds, signs of inflammation or infection. Mild deformity with decreased medial arch. Limited range of motion to the midfoot. Knee brace in place to the right lower extremity.     04/11/2023  No progressive deformity or instability.     05/15/2023   Incision sites are dry and stable with intact sutures. No evidence of dehiscence or infection. Rectus foot alignment. Mild swelling to the midfoot as expected.     06/13/2023: Continued improvement with decreased edema and erythema. Incision sites are well healed at this time. No progressive deformity or instability.     07/17/2023  Continued improvement. Mild swelling involving the mid foot as expected. No pain with palpation. No progressive deformity or instability.     09/14/2023: Moderate swelling involving the midfoot but no significant pain with palpation. No progressive deformity or instability.     11/14/2023: Continued discomfort involving the lateral aspect of the right foot. No significant pain or swelling involving the forefoot and midfoot region. No progressive deformity or instability.     04/18/2024: Discomfort is felt upon palpation on the lateral aspect of the foot and ankle. No progressive deformity or instability. No  swelling or limitation.     9/12/24: Continued discomfort involving the lateral aspect of the right foot and ankle.  Peroneal tendons appear attenuated with continued deficit to the skin and subcutaneous tissues.  Allodynia present with light touch.  Range of motion muscle function is appropriate.  Improved range of motion involving the right knee.  Right midfoot remained stable.     10/15/24: Continued discomfort involving the lateral aspect of the foot and ankle.  Subcutaneous tissue deficit to the lateral aspect of the foot is unchanged.  Allodynia remains present along the sural nerve distribution.  Instability present with talar tilt and anterior drawer testing.  No progressive deformity present.  Midfoot remained stable.    2/12/25: Incision site is dry and stable with intact sutures.  No evidence of dehiscence or infection.  Relative stability noted to the ankle.  Rectus alignment.  Range of motion remains limited secondary to guarding      Results      Assessment & Plan   Diagnoses and all orders for this visit:    1. Right foot pain (Primary)    2. Peroneal tendinitis of right lower extremity    3. Sural neuropathy, right    4. Ankle instability, right      Assessment & Plan    Patient appears to be doing quite well 2 weeks out from surgery.  She has remained nonweightbearing.  Incision sites are dry and stable.  Sutures were removed today without complication.  I have advised her to start gentle range of motion exercises.  She may proceed with partial protected weightbearing activities in the cam boot.  She is to avoid excessive activity.  I would like her to call with any new issues or concerns and I will see her in 2 weeks for reevaluation and repeat imaging           Patient or patient representative verbalized consent for the use of Ambient Listening during the visit with  JESSICA Malloy DPM for chart documentation. 2/12/2025  06:49 EST    JESSICA Malloy DPM
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
## 2025-02-20 ENCOUNTER — OFFICE VISIT (OUTPATIENT)
Dept: PAIN MEDICINE | Facility: CLINIC | Age: 45
End: 2025-02-20
Payer: MEDICAID

## 2025-02-20 VITALS
WEIGHT: 188 LBS | DIASTOLIC BLOOD PRESSURE: 89 MMHG | HEART RATE: 72 BPM | SYSTOLIC BLOOD PRESSURE: 130 MMHG | BODY MASS INDEX: 32.27 KG/M2 | OXYGEN SATURATION: 97 % | RESPIRATION RATE: 16 BRPM

## 2025-02-20 DIAGNOSIS — Z79.899 HIGH RISK MEDICATION USE: Primary | ICD-10-CM

## 2025-02-20 DIAGNOSIS — G90.521 COMPLEX REGIONAL PAIN SYNDROME TYPE 1 OF RIGHT LOWER EXTREMITY: ICD-10-CM

## 2025-02-20 DIAGNOSIS — G89.18 PAIN AT SURGICAL SITE: ICD-10-CM

## 2025-02-20 RX ORDER — OXYCODONE AND ACETAMINOPHEN 7.5; 325 MG/1; MG/1
1 TABLET ORAL 3 TIMES DAILY PRN
Qty: 90 TABLET | Refills: 0 | Status: SHIPPED | OUTPATIENT
Start: 2025-03-11 | End: 2025-04-10

## 2025-02-20 RX ORDER — OXYCODONE AND ACETAMINOPHEN 7.5; 325 MG/1; MG/1
1 TABLET ORAL 3 TIMES DAILY PRN
Qty: 90 TABLET | Refills: 0 | Status: SHIPPED | OUTPATIENT
Start: 2025-04-10 | End: 2025-05-10

## 2025-02-25 ENCOUNTER — TELEPHONE (OUTPATIENT)
Dept: ORTHOPEDIC SURGERY | Facility: CLINIC | Age: 45
End: 2025-02-25
Payer: MEDICAID

## 2025-03-06 ENCOUNTER — OFFICE VISIT (OUTPATIENT)
Age: 45
End: 2025-03-06
Payer: MEDICAID

## 2025-03-06 VITALS — BODY MASS INDEX: 32.1 KG/M2 | WEIGHT: 188 LBS | HEART RATE: 81 BPM | HEIGHT: 64 IN | OXYGEN SATURATION: 95 %

## 2025-03-06 DIAGNOSIS — M76.71 PERONEAL TENDINITIS OF RIGHT LOWER EXTREMITY: ICD-10-CM

## 2025-03-06 DIAGNOSIS — M25.371 ANKLE INSTABILITY, RIGHT: Primary | ICD-10-CM

## 2025-03-06 DIAGNOSIS — G57.81 SURAL NEUROPATHY, RIGHT: ICD-10-CM

## 2025-03-06 PROCEDURE — 1160F RVW MEDS BY RX/DR IN RCRD: CPT | Performed by: PODIATRIST

## 2025-03-06 PROCEDURE — 99024 POSTOP FOLLOW-UP VISIT: CPT | Performed by: PODIATRIST

## 2025-03-06 PROCEDURE — 1159F MED LIST DOCD IN RCRD: CPT | Performed by: PODIATRIST

## 2025-03-08 NOTE — PROGRESS NOTES
03/06/2025  Foot and Ankle Surgery - Established Patient/Follow-up  Provider: Dr. Domenic Malloy DPM  Location: [facility]    Subjective:  Vidya Becerra is a 45 y.o. female.     Chief Complaint   Patient presents with    Right Ankle - Pain, Post-op     Surgery 1/27/2025- 1.  Ankle arthroscopy with extensive debridement, right - 11216  2.  Primary repair of both collateral ankle ligaments, right - 37444  3.  Peroneal tenosynovectomy, right - 87878  4.  Sural neurectomy, right - 13908  5.  Implantation of sural nerve into peroneus brevis muscle, right - 67672  6.  Syndesmotic repair, right - 99012      Post-op Follow-up     Jhonathan Alejo APRN J. IRAIDA BEAN 6/20/24            History of Present Illness  The patient presents for a 6-week postoperative follow-up.    He reports a satisfactory recovery with gradual improvements in his condition. He has been able to perform upward and downward movements without difficulty. However, he experiences discomfort at the posterior aspect of his ankle during certain activities. He has not been engaging in physical therapy. He has intermittently removed his boot and possesses a lace-up ankle brace. He also notes that the boot is causing some wear on his knee.      Allergies   Allergen Reactions    Chlorhexidine Gluconate Itching    Medrol [Methylprednisolone] Itching     Dosepak Oral steroids        Current Outpatient Medications on File Prior to Visit   Medication Sig Dispense Refill    albuterol sulfate  (90 Base) MCG/ACT inhaler Inhale 1-2 puffs As Needed for Shortness of Air or Wheezing. With bronchitis   use preop if needed bring with      amLODIPine (NORVASC) 5 MG tablet Take 1 tablet by mouth Daily.      aspirin 81 MG EC tablet Take 1 tablet by mouth 2 (Two) Times a Day. 60 tablet 0    atorvastatin (LIPITOR) 40 MG tablet TAKE 1 TABLET BY MOUTH EVERY DAY AT NIGHT 90 tablet 1    baclofen (LIORESAL) 10 MG tablet Take 1 tablet by mouth At Night As Needed  "for Muscle Spasms.      famotidine (PEPCID) 40 MG tablet Take 1 tablet by mouth Every Night.      gabapentin (NEURONTIN) 800 MG tablet Take 1 tablet by mouth 3 (Three) Times a Day. Take preop 90 tablet 2    Ibuprofen 3 %, Gabapentin 10 %, Baclofen 2 %, lidocaine 4 %, Ketamine HCl 4 % Apply 1-2 g topically to the appropriate area as directed 3 (Three) to 4 (Four) times daily. 90 g 3    meloxicam (MOBIC) 15 MG tablet Take 1 tablet by mouth Daily.      naloxone (NARCAN) 4 MG/0.1ML nasal spray Call 911. Don't prime. [place] in 1 nostril for overdose. Repeat in 2-3 minutes in other nostril if no or minimal breathing/responsiveness. 2 each 0    ofloxacin (OCUFLOX) 0.3 % ophthalmic solution INSTILL 1 DROP INTO AFFECTED EYE EVERY TWO HOURS WHILE AWAKE      [START ON 3/11/2025] oxyCODONE-acetaminophen (PERCOCET) 7.5-325 MG per tablet Take 1 tablet by mouth 3 (Three) Times a Day As Needed for Severe Pain for up to 30 days. 90 tablet 0    [START ON 4/10/2025] oxyCODONE-acetaminophen (PERCOCET) 7.5-325 MG per tablet Take 1 tablet by mouth 3 (Three) Times a Day As Needed for Severe Pain for up to 30 days. 90 tablet 0     No current facility-administered medications on file prior to visit.       Objective   Pulse 81   Ht 162.6 cm (64\")   Wt 85.3 kg (188 lb)   SpO2 95%   BMI 32.27 kg/m²     Foot/Ankle Exam  Physical Exam  GENERAL  Orientation:  AAOx3  Affect:  appropriate     VASCULAR      Right Foot Vascularity   Normal vascular exam    Dorsalis pedis:  2+  Posterior tibial:  2+  Skin temperature:  warm  Edema grading:  None  CFT:  < 3 seconds  Pedal hair growth:  Present  Varicosities:  none      Left Foot Vascularity   Normal vascular exam    Dorsalis pedis:  2+  Posterior tibial:  2+  Skin temperature:  warm  Edema grading:  None  CFT:  < 3 seconds  Pedal hair growth:  Present  Varicosities:  none     NEUROLOGIC      Right Foot Neurologic   Light touch sensation: normal  Hot/Cold sensation: normal  Achilles reflex:  2+      " Left Foot Neurologic   Light touch sensation: normal  Hot/Cold sensation:  normal  Achilles reflex:  2+     MUSCULOSKELETAL      Right Foot Musculoskeletal   Arch:  Normal      Left Foot Musculoskeletal   Arch:  Normal     MUSCLE STRENGTH      Right Foot Muscle Strength   Normal strength    Foot dorsiflexion:  5  Foot plantar flexion:  5  Foot inversion:  5  Foot eversion:  5      Left Foot Muscle Strength   Normal strength    Foot dorsiflexion:  5  Foot plantar flexion:  5  Foot inversion:  5  Foot eversion:  5     DERMATOLOGIC       Right Foot Dermatologic   Skin  Right foot skin is intact.       Left Foot Dermatologic   Skin  Left foot skin is intact.      TESTS      Right Foot Tests   Anterior drawer: negative  Varus tilt: negative      Left Foot Tests   Anterior drawer: negative  Varus tilt: negative     Right foot additional comments: Moderate discomfort with palpation to the midfoot. Stable scar formations involving the dorsal aspect of the foot and lateral aspect of the rear foot. No open wounds, signs of inflammation or infection. Mild deformity with decreased medial arch. Limited range of motion to the midfoot. Knee brace in place to the right lower extremity.     04/11/2023  No progressive deformity or instability.     05/15/2023   Incision sites are dry and stable with intact sutures. No evidence of dehiscence or infection. Rectus foot alignment. Mild swelling to the midfoot as expected.     06/13/2023: Continued improvement with decreased edema and erythema. Incision sites are well healed at this time. No progressive deformity or instability.     07/17/2023  Continued improvement. Mild swelling involving the mid foot as expected. No pain with palpation. No progressive deformity or instability.     09/14/2023: Moderate swelling involving the midfoot but no significant pain with palpation. No progressive deformity or instability.     11/14/2023: Continued discomfort involving the lateral aspect of the  right foot. No significant pain or swelling involving the forefoot and midfoot region. No progressive deformity or instability.     04/18/2024: Discomfort is felt upon palpation on the lateral aspect of the foot and ankle. No progressive deformity or instability. No swelling or limitation.     9/12/24: Continued discomfort involving the lateral aspect of the right foot and ankle.  Peroneal tendons appear attenuated with continued deficit to the skin and subcutaneous tissues.  Allodynia present with light touch.  Range of motion muscle function is appropriate.  Improved range of motion involving the right knee.  Right midfoot remained stable.     10/15/24: Continued discomfort involving the lateral aspect of the foot and ankle.  Subcutaneous tissue deficit to the lateral aspect of the foot is unchanged.  Allodynia remains present along the sural nerve distribution.  Instability present with talar tilt and anterior drawer testing.  No progressive deformity present.  Midfoot remained stable.     2/12/25: Incision site is dry and stable with intact sutures.  No evidence of dehiscence or infection.  Relative stability noted to the ankle.  Rectus alignment.  Range of motion remains limited secondary to guarding    3/6/25: Continued improvement.  No significant swelling.  Incision sites are well-healed.      Results  Imaging  X-ray of the ankle shows no significant change in the hardware placement.    Assessment & Plan   Diagnoses and all orders for this visit:    1. Ankle instability, right (Primary)  -     XR Ankle 3+ View Right    2. Peroneal tendinitis of right lower extremity  -     Ambulatory Referral to Physical Therapy for Evaluation & Treatment    3. Sural neuropathy, right      Assessment & Plan    The surgical incision is healing well, with no significant concerns noted. The presence of a Vicryl suture was observed, which will be either extracted or trimmed flush if resistance is encountered during removal. He  exhibits minimal residual swelling, indicative of a positive recovery trajectory. The stiffness experienced is likely due to the bioabsorbable anchors used during surgery, which are not visible on radiographic imaging. These anchors were utilized to reconstruct the ligaments in the heel bone. The tightness and stiffness are attributed to scarred soft tissues, a common postoperative symptom. His knee condition may also contribute to the discomfort experienced at the back of the leg. He will be initiated into a physical therapy regimen. A gradual transition from the boot to normal footwear will be encouraged, with the use of a lace-up brace as an intermediate step. He is advised to monitor his activity level closely and incorporate normal activities slowly. If persistent issues arise at the back of the leg, heel, or foot, these will be addressed accordingly.    Follow-up  The patient will follow up in 6 weeks.               Patient or patient representative verbalized consent for the use of Ambient Listening during the visit with  JESSICA Malloy DPM for chart documentation. 3/8/2025  17:12 EST    JESSICA Malloy DPM

## 2025-03-17 ENCOUNTER — TELEPHONE (OUTPATIENT)
Dept: PHYSICAL THERAPY | Facility: CLINIC | Age: 45
End: 2025-03-17

## 2025-03-17 NOTE — TELEPHONE ENCOUNTER
Caller: Vidya Becerra    Relationship: Self        What was the call regarding: HAD A FAMILY EMERGENCY.

## 2025-04-17 ENCOUNTER — OFFICE VISIT (OUTPATIENT)
Age: 45
End: 2025-04-17
Payer: MEDICAID

## 2025-04-17 VITALS — WEIGHT: 188 LBS | RESPIRATION RATE: 20 BRPM | BODY MASS INDEX: 32.1 KG/M2 | HEIGHT: 64 IN

## 2025-04-17 DIAGNOSIS — M76.71 PERONEAL TENDINITIS OF RIGHT LOWER EXTREMITY: ICD-10-CM

## 2025-04-17 DIAGNOSIS — M25.371 ANKLE INSTABILITY, RIGHT: Primary | ICD-10-CM

## 2025-04-17 DIAGNOSIS — G57.81 SURAL NEUROPATHY, RIGHT: ICD-10-CM

## 2025-04-17 PROCEDURE — 99024 POSTOP FOLLOW-UP VISIT: CPT | Performed by: PODIATRIST

## 2025-04-18 NOTE — PROGRESS NOTES
04/17/2025  Foot and Ankle Surgery - Established Patient/Follow-up  Provider: Dr. Domenic Malloy DPM  Location: [facility]    Subjective:  Vidya Becerra is a 45 y.o. female.     Chief Complaint   Patient presents with    Right Ankle - Pain, Post-op     Surgery 1/27/2025- 1.  Ankle arthroscopy with extensive debridement, right - 76828  2.  Primary repair of both collateral ankle ligaments, right - 23885  3.  Peroneal tenosynovectomy, right - 22562  4.  Sural neurectomy, right - 84130  5.  Implantation of sural nerve into peroneus brevis muscle, right - [id number]  6.  Syndesmotic repair, right - 55474      Post-op     PCP: Jhonathan Alejo APRN  Last PCP Visit: 6/20/24         History of Present Illness  The patient presents for evaluation of knee pain.    She reports a significant improvement in her condition, with the absence of previous symptoms such as burning and popping sensations. She has regained her ability to walk without discomfort. However, she experiences mild burning sensations during certain activities, but these are not accompanied by any tightness. She expresses satisfaction with her current state and does not feel the need for additional physical therapy at this time. She has been diligent in performing her prescribed exercises.      Allergies   Allergen Reactions    Chlorhexidine Gluconate Itching    Medrol [Methylprednisolone] Itching     Dosepak Oral steroids        Current Outpatient Medications on File Prior to Visit   Medication Sig Dispense Refill    albuterol sulfate  (90 Base) MCG/ACT inhaler Inhale 1-2 puffs As Needed for Shortness of Air or Wheezing. With bronchitis   use preop if needed bring with      amLODIPine (NORVASC) 5 MG tablet Take 1 tablet by mouth Daily.      aspirin 81 MG EC tablet Take 1 tablet by mouth 2 (Two) Times a Day. 60 tablet 0    atorvastatin (LIPITOR) 40 MG tablet TAKE 1 TABLET BY MOUTH EVERY DAY AT NIGHT 90 tablet 1    baclofen (LIORESAL) 10 MG tablet  "Take 1 tablet by mouth At Night As Needed for Muscle Spasms.      famotidine (PEPCID) 40 MG tablet Take 1 tablet by mouth Every Night.      gabapentin (NEURONTIN) 800 MG tablet Take 1 tablet by mouth 3 (Three) Times a Day. Take preop 90 tablet 2    Ibuprofen 3 %, Gabapentin 10 %, Baclofen 2 %, lidocaine 4 %, Ketamine HCl 4 % Apply 1-2 g topically to the appropriate area as directed 3 (Three) to 4 (Four) times daily. 90 g 3    meloxicam (MOBIC) 15 MG tablet Take 1 tablet by mouth Daily.      naloxone (NARCAN) 4 MG/0.1ML nasal spray Call 911. Don't prime. [place] in 1 nostril for overdose. Repeat in 2-3 minutes in other nostril if no or minimal breathing/responsiveness. 2 each 0    ofloxacin (OCUFLOX) 0.3 % ophthalmic solution INSTILL 1 DROP INTO AFFECTED EYE EVERY TWO HOURS WHILE AWAKE      oxyCODONE-acetaminophen (PERCOCET) 7.5-325 MG per tablet Take 1 tablet by mouth 3 (Three) Times a Day As Needed for Severe Pain for up to 30 days. 90 tablet 0     No current facility-administered medications on file prior to visit.       Objective   Resp 20   Ht 162.6 cm (64\")   Wt 85.3 kg (188 lb)   BMI 32.27 kg/m²     Foot/Ankle Exam  Physical Exam  GENERAL  Orientation:  AAOx3  Affect:  appropriate     VASCULAR      Right Foot Vascularity   Normal vascular exam    Dorsalis pedis:  2+  Posterior tibial:  2+  Skin temperature:  warm  Edema grading:  None  CFT:  < 3 seconds  Pedal hair growth:  Present  Varicosities:  none      Left Foot Vascularity   Normal vascular exam    Dorsalis pedis:  2+  Posterior tibial:  2+  Skin temperature:  warm  Edema grading:  None  CFT:  < 3 seconds  Pedal hair growth:  Present  Varicosities:  none     NEUROLOGIC      Right Foot Neurologic   Light touch sensation: normal  Hot/Cold sensation: normal  Achilles reflex:  2+      Left Foot Neurologic   Light touch sensation: normal  Hot/Cold sensation:  normal  Achilles reflex:  2+     MUSCULOSKELETAL      Right Foot Musculoskeletal   Arch:  Normal   "    Left Foot Musculoskeletal   Arch:  Normal     MUSCLE STRENGTH      Right Foot Muscle Strength   Normal strength    Foot dorsiflexion:  5  Foot plantar flexion:  5  Foot inversion:  5  Foot eversion:  5      Left Foot Muscle Strength   Normal strength    Foot dorsiflexion:  5  Foot plantar flexion:  5  Foot inversion:  5  Foot eversion:  5     DERMATOLOGIC       Right Foot Dermatologic   Skin  Right foot skin is intact.       Left Foot Dermatologic   Skin  Left foot skin is intact.      TESTS      Right Foot Tests   Anterior drawer: negative  Varus tilt: negative      Left Foot Tests   Anterior drawer: negative  Varus tilt: negative     Right foot additional comments: Moderate discomfort with palpation to the midfoot. Stable scar formations involving the dorsal aspect of the foot and lateral aspect of the rear foot. No open wounds, signs of inflammation or infection. Mild deformity with decreased medial arch. Limited range of motion to the midfoot. Knee brace in place to the right lower extremity.     04/11/2023  No progressive deformity or instability.     05/15/2023   Incision sites are dry and stable with intact sutures. No evidence of dehiscence or infection. Rectus foot alignment. Mild swelling to the midfoot as expected.     06/13/2023: Continued improvement with decreased edema and erythema. Incision sites are well healed at this time. No progressive deformity or instability.     07/17/2023  Continued improvement. Mild swelling involving the mid foot as expected. No pain with palpation. No progressive deformity or instability.     09/14/2023: Moderate swelling involving the midfoot but no significant pain with palpation. No progressive deformity or instability.     11/14/2023: Continued discomfort involving the lateral aspect of the right foot. No significant pain or swelling involving the forefoot and midfoot region. No progressive deformity or instability.     04/18/2024: Discomfort is felt upon palpation  on the lateral aspect of the foot and ankle. No progressive deformity or instability. No swelling or limitation.     9/12/24: Continued discomfort involving the lateral aspect of the right foot and ankle.  Peroneal tendons appear attenuated with continued deficit to the skin and subcutaneous tissues.  Allodynia present with light touch.  Range of motion muscle function is appropriate.  Improved range of motion involving the right knee.  Right midfoot remained stable.     10/15/24: Continued discomfort involving the lateral aspect of the foot and ankle.  Subcutaneous tissue deficit to the lateral aspect of the foot is unchanged.  Allodynia remains present along the sural nerve distribution.  Instability present with talar tilt and anterior drawer testing.  No progressive deformity present.  Midfoot remained stable.     2/12/25: Incision site is dry and stable with intact sutures.  No evidence of dehiscence or infection.  Relative stability noted to the ankle.  Rectus alignment.  Range of motion remains limited secondary to guarding     3/6/25: Continued improvement.  No significant swelling.  Incision sites are well-healed.    4/17/25: Patient has noticed significant improvement since previous exam.  Range of motion is near normal.  No significant swelling.        Results      Assessment & Plan   Diagnoses and all orders for this visit:    1. Ankle instability, right (Primary)    2. Peroneal tendinitis of right lower extremity    3. Sural neuropathy, right      Assessment & Plan    Her condition has shown significant improvement, both clinically and structurally, as evidenced by her enhanced range of motion and overall well-being. She is advised to gradually discontinue the use of the brace, while maintaining the use of supportive tennis shoes. A regimen of low-impact exercises, particularly those that promote good range of motion for the knee and ankle, is recommended. The brace can be utilized during activities  that are more strenuous or involve uneven terrain. She is encouraged to consider physical therapy as an option to further optimize her recovery. She is cautioned against engaging in activities on uneven terrain or increasing her activity level too rapidly.    Follow-up  The patient will follow up in 3 to 6 months.             Patient or patient representative verbalized consent for the use of Ambient Listening during the visit with  JESSICA Malloy DPM for chart documentation. 4/18/2025  06:39 EDT    JESSICA Malloy DPM

## 2025-08-14 ENCOUNTER — OFFICE VISIT (OUTPATIENT)
Age: 45
End: 2025-08-14
Payer: MEDICAID

## 2025-08-14 VITALS — BODY MASS INDEX: 32.1 KG/M2 | RESPIRATION RATE: 20 BRPM | HEIGHT: 64 IN | WEIGHT: 188 LBS

## 2025-08-14 DIAGNOSIS — M25.571 ACUTE RIGHT ANKLE PAIN: ICD-10-CM

## 2025-08-14 DIAGNOSIS — Z96.9 PRESENCE OF RETAINED HARDWARE: ICD-10-CM

## 2025-08-14 DIAGNOSIS — M25.371 ANKLE INSTABILITY, RIGHT: Primary | ICD-10-CM

## 2025-08-14 RX ORDER — OXYCODONE AND ACETAMINOPHEN 7.5; 325 MG/1; MG/1
TABLET ORAL
COMMUNITY
Start: 2025-07-02

## 2025-08-29 ENCOUNTER — OFFICE VISIT (OUTPATIENT)
Dept: ORTHOPEDIC SURGERY | Facility: CLINIC | Age: 45
End: 2025-08-29
Payer: MEDICAID

## 2025-08-29 VITALS — HEART RATE: 100 BPM | BODY MASS INDEX: 31.48 KG/M2 | OXYGEN SATURATION: 100 % | WEIGHT: 184.4 LBS | HEIGHT: 64 IN

## 2025-08-29 DIAGNOSIS — Z96.651 S/P TKR (TOTAL KNEE REPLACEMENT), RIGHT: Primary | ICD-10-CM

## (undated) DEVICE — C-ARM: Brand: UNBRANDED

## (undated) DEVICE — NEEDLE, QUINCKE, 20GX3.5": Brand: MEDLINE

## (undated) DEVICE — GLV SURG BIOGEL M LTX PF 7 1/2

## (undated) DEVICE — SOL IRRIG H2O 1000ML STRL

## (undated) DEVICE — SUT VIC 2/0 CT2 27IN J269H

## (undated) DEVICE — FOAM BUMP, LARGE: Brand: MEDLINE INDUSTRIES, INC.

## (undated) DEVICE — DECANTER: Brand: UNBRANDED

## (undated) DEVICE — COVER,MAYO STAND,STERILE: Brand: MEDLINE

## (undated) DEVICE — CATH DIAG IMPULSE FL4 6F 100CM

## (undated) DEVICE — ANTIBACTERIAL UNDYED BRAIDED (POLYGLACTIN 910), SYNTHETIC ABSORBABLE SUTURE: Brand: COATED VICRYL

## (undated) DEVICE — PK EXTREM 50

## (undated) DEVICE — SOLUTION,WATER,IRRIGATION,1000ML,STERILE: Brand: MEDLINE

## (undated) DEVICE — SUT MNCRYL 2/0 CT1 36IN UD MCP945H

## (undated) DEVICE — UNDERGLV SURG BIOGEL INDICAT PI SZ8.5 BLU

## (undated) DEVICE — SOL IRRIG SOD CHL 0.9PCT 3000ML

## (undated) DEVICE — DRILL BIT

## (undated) DEVICE — NDL HYPO PRECISIONGLIDE/REG 18G 11/2 PNK

## (undated) DEVICE — SUT ETHLN 2/0 PS 18IN 585H

## (undated) DEVICE — SUT ETHLN 3/0 FS1 30IN 669H

## (undated) DEVICE — BLD SHVE TORPEDO 3.5MM 7CM

## (undated) DEVICE — TEMP FIXATION PIN

## (undated) DEVICE — DYNAMIC COMPRESSION SYSTEM: Brand: EASYFUSE

## (undated) DEVICE — PAD UNDERCAST WYTEX 4IN 4YD LF STRL

## (undated) DEVICE — SYR LUERLOK 30CC

## (undated) DEVICE — PK TOTL KN 50

## (undated) DEVICE — DRAPE,U/ SHT,SPLIT,PLAS,STERIL: Brand: MEDLINE

## (undated) DEVICE — UNDERGLV SURG BIOGEL INDICAT PI SZ8 BLU

## (undated) DEVICE — BLD CUT FORMLA AGGR 2.5MM

## (undated) DEVICE — SOL IRRIG NACL 1000ML

## (undated) DEVICE — DRILL BIT: Brand: DART-FIRE

## (undated) DEVICE — MICRO SAGITTAL BLADE (9.5 X 0.4 X 25.5MM)

## (undated) DEVICE — PAD,ABDOMINAL,5"X9",STERILE,LF,1/PK: Brand: MEDLINE INDUSTRIES, INC.

## (undated) DEVICE — CUFF TOURNI 1BLADDER 1PRT 18IN STRL

## (undated) DEVICE — ADHS LIQ MASTISOL 2/3ML

## (undated) DEVICE — INTENDED FOR TISSUE SEPARATION, AND OTHER PROCEDURES THAT REQUIRE A SHARP SURGICAL BLADE TO PUNCTURE OR CUT.: Brand: BARD-PARKER ® CARBON RIB-BACK BLADES

## (undated) DEVICE — GW PTFE EMERALD HEPCOAT FC J TIP STD .035 3MM 150CM

## (undated) DEVICE — DISPOSABLE TOURNIQUET CUFF SINGLE BLADDER, SINGLE PORT AND QUICK CONNECT CONNECTOR: Brand: COLOR CUFF

## (undated) DEVICE — BANDAGE,GAUZE,BULKEE II,4.5"X4.1YD,STRL: Brand: MEDLINE

## (undated) DEVICE — THE STERILE LIGHT HANDLE COVER IS USED WITH STERIS SURGICAL LIGHTING AND VISUALIZATION SYSTEMS.

## (undated) DEVICE — CATH DIAG IMPULSE FR4 6F 100CM

## (undated) DEVICE — ST ACC MICROPUNCTURE STFF/CANN PLAT/TP 4F 21G 40CM

## (undated) DEVICE — A2000 MULTI-USE SYRINGE KIT, P/N 701277-003KIT CONTENTS: 100ML CONTRAST RESERVOIR AND TUBING WITH CONTRAST SPIKE AND CLAMP: Brand: A2000 MULTI-USE SYRINGE KIT

## (undated) DEVICE — DRAPE,ORTHOMAX,ARTHRO T ,W/ POUCH: Brand: MEDLINE

## (undated) DEVICE — ADHS SKIN PREMIERPRO EXOFIN TOPICAL HI/VISC .5ML

## (undated) DEVICE — KT SURG TURNOVER 050

## (undated) DEVICE — BLD DISSCT COOL CUT SJ 3MM 7CM

## (undated) DEVICE — T-DRAPE,EXTREMITY,STERILE: Brand: MEDLINE

## (undated) DEVICE — KT DRP MINIVIEW STRL

## (undated) DEVICE — HEADED COUNTERSINK: Brand: DARCO

## (undated) DEVICE — GAUZE,SPONGE,FLUFF,6"X6.75",STRL,5/TRAY: Brand: MEDLINE

## (undated) DEVICE — APPL DURAPREP IODOPHOR APL 26ML

## (undated) DEVICE — GLOVE,SURG,SENSICARE SLT,LF,PF,7.5: Brand: MEDLINE

## (undated) DEVICE — Device

## (undated) DEVICE — SOL IRR NACL 0.9PCT ARTHROMATIC 3000ML

## (undated) DEVICE — WRAP KNEE COLD THERAPY

## (undated) DEVICE — SPLNT SCOTCHCAST QUICKSTEP DBL/SD/FELT FIBRGLS 4X30IN WHT

## (undated) DEVICE — MODEL BT2000 P/N 700287-012KIT CONTENTS: MANIFOLD WITH SALINE AND CONTRAST PORTS, SALINE TUBING WITH SPIKE AND HAND SYRINGE, TRANSDUCER: Brand: BT2000 AUTOMATED MANIFOLD KIT

## (undated) DEVICE — SUT ETHIB 2 CV V37 MS/4 30IN MX69G

## (undated) DEVICE — 3M™ IOBAN™ 2 ANTIMICROBIAL INCISE DRAPE 6651EZ: Brand: IOBAN™ 2

## (undated) DEVICE — THE STERILE CAMERA HANDLE COVER IS FOR USE WITH THE STERIS SURGICAL LIGHTING AND VISUALIZATION SYSTEMS.

## (undated) DEVICE — GOWN,REINFORCE,POLY,SIRUS,BREATH SLV,XLG: Brand: MEDLINE

## (undated) DEVICE — DUAL CUT SAGITTAL BLADE

## (undated) DEVICE — SPNG GZ WOVN 4X4IN 12PLY 10/BX STRL

## (undated) DEVICE — K-WIRE W/ LONG SMOOTH TIP
Type: IMPLANTABLE DEVICE | Site: FOOT | Status: NON-FUNCTIONAL
Removed: 2023-05-01

## (undated) DEVICE — KT HNDCTRL ANGIOTOUCH AT/X65

## (undated) DEVICE — STARCLOSE SE VASCULAR CLOSURE SYSTEM: Brand: STARCLOSE SE

## (undated) DEVICE — BNDG ELAS MATRX V/CLS 4IN 5YD LF

## (undated) DEVICE — PENCL HND ROCKRSWTCH HOLSTR EZ CLEAN TP CRD 10FT

## (undated) DEVICE — APPL CHLORAPREP HI/LITE TINTED 10.5ML ORNG

## (undated) DEVICE — IRRIGATOR BULB ASEPTO 60CC STRL

## (undated) DEVICE — NDL HYPO PRECISIONGLIDE/REG 18G 1IN PNK

## (undated) DEVICE — BNDG ESMARK 4IN 12FT LF STRL BLU

## (undated) DEVICE — SHT AIR TRANSFR COMFRT GLIDE LAT 40X80IN

## (undated) DEVICE — CVR HNDL LT SURG ACCSSRY BLU STRL

## (undated) DEVICE — CATH DIAG IMPULSE PIG .056 6F 110CM

## (undated) DEVICE — SUT VIC 3/0 CT2 27IN J232H

## (undated) DEVICE — PADDING,UNDERCAST,COTTON, 4"X4YD STERILE: Brand: MEDLINE

## (undated) DEVICE — GLV SURG SENSICARE PI LF PF 8 GRN STRL

## (undated) DEVICE — SLV SCD CALF HEMOFORCE DVT THERP REPROC MD

## (undated) DEVICE — DRSNG GZ CURAD XEROFORM PETROLTM OVERWRP 1X8IN STRL

## (undated) DEVICE — SOL IRR NACL 0.9PCT BO 1000ML

## (undated) DEVICE — TUBING, SUCTION, 1/4" X 12', STRAIGHT: Brand: MEDLINE

## (undated) DEVICE — PAD CAST SYN PROTOUCH RL 4IN NS

## (undated) DEVICE — ELECTRD DEFIB M/FUNC PROPADZ RADIOL 2PK

## (undated) DEVICE — PK TRY HEART CATH 50

## (undated) DEVICE — SOL ISO/ALC RUB 70PCT 4OZ

## (undated) DEVICE — UNDRGLV SURG BIOGEL PUNCTUREINDICATION SZ7 PF STRL

## (undated) DEVICE — PINNACLE INTRODUCER SHEATH: Brand: PINNACLE

## (undated) DEVICE — BNDG ELAS ELITE V/CLOSE 4IN 5YD LF STRL

## (undated) DEVICE — BANDAGE,ELASTIC,ESMARK,STERILE,6"X9',LF: Brand: MEDLINE

## (undated) DEVICE — CEMENT MIXING SYSTEM WITH FEMORAL BREAKWAY NOZZLE: Brand: REVOLUTION

## (undated) DEVICE — PICO 7 10CM X 30CM: Brand: PICO™ 7